# Patient Record
Sex: FEMALE | Race: WHITE | NOT HISPANIC OR LATINO | Employment: OTHER | URBAN - METROPOLITAN AREA
[De-identification: names, ages, dates, MRNs, and addresses within clinical notes are randomized per-mention and may not be internally consistent; named-entity substitution may affect disease eponyms.]

---

## 2017-03-06 ENCOUNTER — OFFICE VISIT (OUTPATIENT)
Dept: PEDIATRICS | Facility: CLINIC | Age: 27
End: 2017-03-06
Payer: COMMERCIAL

## 2017-03-06 VITALS
BODY MASS INDEX: 21.38 KG/M2 | HEART RATE: 85 BPM | OXYGEN SATURATION: 97 % | SYSTOLIC BLOOD PRESSURE: 110 MMHG | DIASTOLIC BLOOD PRESSURE: 70 MMHG | TEMPERATURE: 98.4 F | WEIGHT: 128.3 LBS | HEIGHT: 65 IN

## 2017-03-06 DIAGNOSIS — G44.209 MUSCLE TENSION HEADACHE: Primary | ICD-10-CM

## 2017-03-06 DIAGNOSIS — M62.838 MUSCLE SPASMS OF NECK: ICD-10-CM

## 2017-03-06 DIAGNOSIS — H53.9 VISION CHANGES: ICD-10-CM

## 2017-03-06 PROCEDURE — 99214 OFFICE O/P EST MOD 30 MIN: CPT | Performed by: NURSE PRACTITIONER

## 2017-03-06 RX ORDER — AMITRIPTYLINE HYDROCHLORIDE 10 MG/1
TABLET ORAL
Qty: 30 TABLET | Refills: 0 | Status: SHIPPED | OUTPATIENT
Start: 2017-03-06 | End: 2017-05-31

## 2017-03-06 NOTE — PATIENT INSTRUCTIONS
It was a pleasure seeing you today at the [facility]. Thank you for allowing us to care for you today. We truly hope we provided you with the excellent service you deserve. Please let us know if there is anything else we can do for you so we can be sure you are leaving completley satisfied with your care experience.       General information about your clinic   Clinic Hours Lab Hours (Appointments are required)   Mon-Thurs: 7:30 AM - 7 PM Mon-Thurs: 7:30 AM - 7 PM   Fri: 7:30 AM - 5 PM Fri: 7:30 AM - 5 PM        After Hours Nurse Advise & Appts:  [facility] Nurse Advisors: [phone]  [facility] On Call: to make appointments anytime: [phone] On Call Physician: call [phone] and answering service will page the on call physician.        For urgent appointments, please call [phone] and ask for the triage nurse or your care team clinic nurse.  How to contact my care team:  [facility]: [email or web address]   Phone: [phone]   Fax: [phone]       [facility]:   Phone: [phone]  Hours: 8:00 AM - 6:00 PM  Medication Refills:  Call your pharmacy and they will forward the refill to us. Please allow 3 business days for your refills to be completed.       Normal or non-critical lab and imaging results will be communicated to you by MyChart, letter or phone within 7 days.  If you do not hear from us within 10 days, please call the clinic. If you have a critical or abnormal lab result, we will notify you by phone as soon as possible.       We now have PWIC (Pediatric Walk in Care)  Monday-Friday from 7:30-4. Simply walk in and be seen for your urgent needs like cough, fever, rash, diarrhea or vomiting, pink eye, UTI. No appointments needed. Ask one of the team for more information      -Your Care Team:    Dr. Barbara Foley - Internal Medicine/Pediatrics   Dr. Chandana Boone - Family Medicine  Dr. Aleida Medel - Pediatrics  Eneida Aviles CNP - Family Practice Nurse  Practitioner  Dr. Mone Barnett - Pediatrics  Dr. Jason Plaza - Internal Medicine      PLAN:   1.   Symptomatic therapy suggested:   Will try the elavil for sleep and also for headache prevention  Stop the trazodone.  2.  Orders Placed This Encounter   Medications     tiZANidine (ZANAFLEX) 4 MG tablet     Sig: Take 1 tablet (4 mg) by mouth 3 times daily as needed for muscle spasms     Dispense:  30 tablet     Refill:  1     amitriptyline (ELAVIL) 10 MG tablet     Sig: Start at 1 tab at bedtime for 3 days, then 2 tabs at bedtime     Dispense:  30 tablet     Refill:  0     Orders Placed This Encounter   Procedures     PHYSICAL THERAPY REFERRAL       3. Patient needs to follow up in if no improvement,or sooner if worsening of symptoms or other symptoms develop.  CONSULTATION/REFERRAL to physical therapy

## 2017-03-06 NOTE — MR AVS SNAPSHOT
After Visit Summary   3/6/2017    Xiomara MARSHALL    MRN: [id number]           Patient Information     Date Of Birth          1990        Visit Information        Provider Department      3/6/2017 1:40 PM Eneida Aviles APRN CNP [facility]        Today's Diagnoses     Muscle tension headache    -  1    Vision changes        Muscle spasms of neck          Care Instructions    It was a pleasure seeing you today at the [facility]. Thank you for allowing us to care for you today. We truly hope we provided you with the excellent service you deserve. Please let us know if there is anything else we can do for you so we can be sure you are leaving completley satisfied with your care experience.       General information about your clinic   Clinic Hours Lab Hours (Appointments are required)   Mon-Thurs: 7:30 AM - 7 PM Mon-Thurs: 7:30 AM - 7 PM   Fri: 7:30 AM - 5 PM Fri: 7:30 AM - 5 PM        After Hours Nurse Advise & Appts:  [facility] Nurse Advisors: [phone]  [facility] On Call: to make appointments anytime: [phone] On Call Physician: call [phone] and answering service will page the on call physician.        For urgent appointments, please call [phone] and ask for the triage nurse or your care team clinic nurse.  How to contact my care team:  [facility]: [email or web address]   Phone: [phone]   Fax: [phone]       [facility]:   Phone: [phone]  Hours: 8:00 AM - 6:00 PM  Medication Refills:  Call your pharmacy and they will forward the refill to us. Please allow 3 business days for your refills to be completed.       Normal or non-critical lab and imaging results will be communicated to you by MyChart, letter or phone within 7 days.  If you do not hear from us within 10 days, please call the clinic. If you have a critical or abnormal lab result, we will notify you by phone as soon as possible.       We now  have PWIC (Pediatric Walk in Care)  Monday-Friday from 7:30-4. Simply walk in and be seen for your urgent needs like cough, fever, rash, diarrhea or vomiting, pink eye, UTI. No appointments needed. Ask one of the team for more information      -Your Care Team:    Dr. Barbara Foley - Internal Medicine/Pediatrics   Dr. Chandana Boone - Family Medicine  Dr. Aleida Medel - Pediatrics  Eneida Aviles CNP - Family Practice Nurse Practitioner  Dr. Mone Barnett - Pediatrics  Dr. Jason Plaza - Internal Medicine      PLAN:   1.   Symptomatic therapy suggested:   Will try the elavil for sleep and also for headache prevention  Stop the trazodone.  2.  Orders Placed This Encounter   Medications     tiZANidine (ZANAFLEX) 4 MG tablet     Sig: Take 1 tablet (4 mg) by mouth 3 times daily as needed for muscle spasms     Dispense:  30 tablet     Refill:  1     amitriptyline (ELAVIL) 10 MG tablet     Sig: Start at 1 tab at bedtime for 3 days, then 2 tabs at bedtime     Dispense:  30 tablet     Refill:  0     Orders Placed This Encounter   Procedures     PHYSICAL THERAPY REFERRAL       3. Patient needs to follow up in if no improvement,or sooner if worsening of symptoms or other symptoms develop.  CONSULTATION/REFERRAL to physical therapy               Follow-ups after your visit        Additional Services     PHYSICAL THERAPY REFERRAL       *This therapy referral will be filtered to a centralized scheduling office at [facility] and the patient will receive a call to schedule an appointment at a [place] location most convenient for them. *     [facility] provides Physical Therapy evaluation and treatment and many specialty services across the [place] system.  If requesting a specialty program, please choose from the list below.    If you have not heard from the scheduling office within 2 business days, please call [phone] for all locations, with the exception of [place], please call  [phone].  Treatment: Evaluation & Treatment  Special Instructions/Modalities:   Special Programs: None    Please be aware that coverage of these services is subject to the terms and limitations of your health insurance plan.  Call member services at your health plan with any benefit or coverage questions.      **Note to Provider:  If you are referring outside of [facility] for the therapy appointment, please list the name of the location in the  special instructions  above, print the referral and give to the patient to schedule the appointment.                  Who to contact     If you have questions or need follow up information about today's clinic visit or your schedule please contact [facility] directly at [phone].  Normal or non-critical lab and imaging results will be communicated to you by Nopsechart, letter or phone within 4 business days after the clinic has received the results. If you do not hear from us within 7 days, please contact the clinic through Spindlet or phone. If you have a critical or abnormal lab result, we will notify you by phone as soon as possible.  Submit refill requests through Sp[employer]Frog or call your pharmacy and they will forward the refill request to us. Please allow 3 business days for your refill to be completed.          Additional Information About Your Visit        SpiralFrog Information     Spir[employer]Frog gives you secure access to your electronic health record. If you see a primary care provider, you can also send messages to your care team and make appointments. If you have questions, please call your primary care clinic.  If you do not have a primary care provider, please call [phone] and they will assist you.      SpiralFrog is an electronic gateway that provides easy, online access to your medical records. With SpiralFrog, you can request a clinic appointment, read your test results, renew a prescription or communicate with your care team.     To access your  "existing account, please contact your [facility] or call [phone] for assistance.        Care EveryWhere ID     This is your Care EveryWhere ID. This could be used by other organizations to access your [facility] medical records  [id number]        Your Vitals Were     Pulse Temperature Height Last Period Pulse Oximetry Breastfeeding?    85 98.4  F (36.9  C) (Temporal) 5' 5\" (1.651 m) 02/20/2017 97% No    BMI (Body Mass Index)                   21.35 kg/m2            Blood Pressure from Last 3 Encounters:   03/06/17 110/70   10/28/16 107/57   10/03/16 115/74    Weight from Last 3 Encounters:   03/06/17 128 lb 4.8 oz (58.2 kg)   10/28/16 122 lb 6.4 oz (55.5 kg)   10/03/16 122 lb (55.3 kg)              We Performed the Following     PHYSICAL THERAPY REFERRAL          Today's Medication Changes          These changes are accurate as of: 3/6/17  2:16 PM.  If you have any questions, ask your nurse or doctor.               Start taking these medicines.        Dose/Directions    amitriptyline 10 MG tablet   Commonly known as:  ELAVIL   Used for:  Muscle tension headache   Started by:  Eneida Aviles APRN CNP        Start at 1 tab at bedtime for 3 days, then 2 tabs at bedtime   Quantity:  30 tablet   Refills:  0       tiZANidine 4 MG tablet   Commonly known as:  ZANAFLEX   Used for:  Muscle tension headache   Started by:  Eneida Aviles APRN CNP        Dose:  4 mg   Take 1 tablet (4 mg) by mouth 3 times daily as needed for muscle spasms   Quantity:  30 tablet   Refills:  1         Stop taking these medicines if you haven't already. Please contact your care team if you have questions.     traZODone 100 MG tablet   Commonly known as:  DESYREL   Stopped by:  Eneida Aviles APRN CNP                Where to get your medicines      These medications were sent to [id number] - [place], [place] - [address]  [address] [place] [address]     Phone:  [phone]     amitriptyline 10 MG tablet    tiZANidine 4 MG tablet                Primary Care Provider Office Phone # Fax #    KEL Goldstein [facility] [phone]       [facility] [address]  [place] [address]        Thank you!     Thank you for choosing [facility]  for your care. Our goal is always to provide you with excellent care. Hearing back from our patients is one way we can continue to improve our services. Please take a few minutes to complete the written survey that you may receive in the mail after your visit with us. Thank you!             Your Updated Medication List - Protect others around you: Learn how to safely use, store and throw away your medicines at www.disposemymeds.org.          This list is accurate as of: 3/6/17  2:16 PM.  Always use your most recent med list.                   Brand Name Dispense Instructions for use    amitriptyline 10 MG tablet    ELAVIL    30 tablet    Start at 1 tab at bedtime for 3 days, then 2 tabs at bedtime       ondansetron 4 MG tablet    ZOFRAN     Take 1 Tab by mouth every 8 (eight) hours as needed for Nausea & Vomiting.       ranitidine 150 MG tablet    ZANTAC    60 tablet    Take 1 tablet (150 mg) by mouth 2 times daily       sertraline 100 MG tablet    ZOLOFT    90 tablet    Take 1 tablet (100 mg) by mouth daily 1 tablet orally daily       tiZANidine 4 MG tablet    ZANAFLEX    30 tablet    Take 1 tablet (4 mg) by mouth 3 times daily as needed for muscle spasms
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
## 2017-05-08 DIAGNOSIS — G47.00 INSOMNIA, UNSPECIFIED TYPE: ICD-10-CM

## 2017-05-09 RX ORDER — TRAZODONE HYDROCHLORIDE 100 MG/1
TABLET ORAL
Qty: 90 TABLET | Refills: 0 | OUTPATIENT
Start: 2017-05-09

## 2017-05-09 NOTE — TELEPHONE ENCOUNTER
traZODone (DESYREL) 100 MG tablet  Medication DC'd on 3/6/2017  Refill denied.  Clara Roberts RN

## 2017-05-31 ENCOUNTER — MYC REFILL (OUTPATIENT)
Dept: PEDIATRICS | Facility: CLINIC | Age: 27
End: 2017-05-31

## 2017-05-31 DIAGNOSIS — G44.209 MUSCLE TENSION HEADACHE: ICD-10-CM

## 2017-06-01 RX ORDER — AMITRIPTYLINE HYDROCHLORIDE 10 MG/1
10 TABLET ORAL AT BEDTIME
Qty: 90 TABLET | Refills: 1 | Status: SHIPPED | OUTPATIENT
Start: 2017-06-01 | End: 2017-06-01

## 2017-06-01 NOTE — TELEPHONE ENCOUNTER
Routing refill request to provider for review/approval because:  Natalia, can you please review and enter appropriate ongoing dose for this patient now that they have completed the starting dose?      Clara Roberts RN

## 2017-06-01 NOTE — TELEPHONE ENCOUNTER
Message from [place]:  Original authorizing provider: KEL Goldstein CNP would like a refill of the following medications:  amitriptyline (ELAVIL) 10 MG tablet [KEL Goldstein CNP]    Preferred pharmacy: [id number] - [place], [place] - [address]    Comment:

## 2017-06-01 NOTE — TELEPHONE ENCOUNTER
amitriptyline (ELAVIL) 10 MG tablet     Last Written Prescription Date: 03/06/17  Last Fill Quantity: 30, # refills: 0  Last Office Visit with [facility], [facility] or [facility] prescribing provider: 03/06/17.        BP Readings from Last 3 Encounters:   03/06/17 110/70   10/28/16 107/57   10/03/16 115/74     Last PHQ-9 score on record=   PHQ-9 SCORE 10/3/2016   Total Score 4

## 2017-08-18 ENCOUNTER — OFFICE VISIT (OUTPATIENT)
Dept: PEDIATRICS | Facility: CLINIC | Age: 27
End: 2017-08-18
Payer: COMMERCIAL

## 2017-08-18 VITALS
TEMPERATURE: 97.8 F | SYSTOLIC BLOOD PRESSURE: 114 MMHG | DIASTOLIC BLOOD PRESSURE: 60 MMHG | HEART RATE: 98 BPM | WEIGHT: 133.4 LBS | OXYGEN SATURATION: 98 % | BODY MASS INDEX: 22.2 KG/M2

## 2017-08-18 DIAGNOSIS — S06.9XAS HEADACHE DUE TO OLD BRAIN INJURY (H): Primary | ICD-10-CM

## 2017-08-18 DIAGNOSIS — G44.309 HEADACHE DUE TO OLD BRAIN INJURY (H): Primary | ICD-10-CM

## 2017-08-18 PROCEDURE — 99214 OFFICE O/P EST MOD 30 MIN: CPT | Performed by: NURSE PRACTITIONER

## 2017-08-18 RX ORDER — NORTRIPTYLINE HCL 10 MG
CAPSULE ORAL
Qty: 90 CAPSULE | Refills: 0 | Status: SHIPPED | OUTPATIENT
Start: 2017-08-18 | End: 2018-01-02 | Stop reason: ALTCHOICE

## 2017-08-18 RX ORDER — BUTALBITAL, ACETAMINOPHEN AND CAFFEINE 50; 325; 40 MG/1; MG/1; MG/1
1 TABLET ORAL EVERY 4 HOURS PRN
Qty: 28 TABLET | Refills: 1 | Status: SHIPPED | OUTPATIENT
Start: 2017-08-18 | End: 2018-01-02

## 2017-08-18 ASSESSMENT — PAIN SCALES - GENERAL: PAINLEVEL: EXTREME PAIN (8)
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
## 2017-08-18 NOTE — PATIENT INSTRUCTIONS
PLAN:   1.   Symptomatic therapy suggested: Will try the nortriptyline at bedtime and the Fioricet as needed.  2.  Orders Placed This Encounter   Medications     nortriptyline (PAMELOR) 10 MG capsule     Sig: Start at 1 tab at bedtime for 3 days, then 2 tabs at bedtime for 3 days, then 3 tabs at bedtime.     Dispense:  90 capsule     Refill:  0     butalbital-acetaminophen-caffeine (FIORICET/ESGIC) -40 MG per tablet     Sig: Take 1 tablet by mouth every 4 hours as needed     Dispense:  28 tablet     Refill:  1     Orders Placed This Encounter   Procedures     MR Brain w/o Contrast     NEUROLOGY ADULT REFERRAL       3. Patient needs to follow up in if no improvement,or sooner if worsening of symptoms or other symptoms develop.  CONSULTATION/REFERRAL to Neurology    It was a pleasure seeing you today at the [facility]. Thank you for allowing us to care for you today. We truly hope we provided you with the excellent service you deserve. Please let us know if there is anything else we can do for you so we can be sure you are leaving completley satisfied with your care experience.       General information about your clinic   Clinic Hours Lab Hours (Appointments are required)   Mon-Thurs: 7:30 AM - 7 PM Mon-Thurs: 7:30 AM - 7 PM   Fri: 7:30 AM - 5 PM Fri: 7:30 AM - 5 PM        After Hours Nurse Advise & Appts:  [facility] Nurse Advisors: [phone]  [facility] On Call: to make appointments anytime: [phone] On Call Physician: call [phone] and answering service will page the on call physician.        For urgent appointments, please call [phone] and ask for the triage nurse or your care team clinic nurse.  How to contact my care team:  MyChart: [email or web address]   Phone: [phone]   Fax: [phone]       [facility]:   Phone: [phone]  Hours: 8:00 AM - 6:00 PM  Medication Refills:  Call your pharmacy and they will forward the refill to us. Please allow  3 business days for your refills to be completed.       Normal or non-critical lab and imaging results will be communicated to you by MyChart, letter or phone within 7 days.  If you do not hear from us within 10 days, please call the clinic. If you have a critical or abnormal lab result, we will notify you by phone as soon as possible.       We now have PWIC (Pediatric Walk in Care)  Monday-Friday from 7:30-4. Simply walk in and be seen for your urgent needs like cough, fever, rash, diarrhea or vomiting, pink eye, UTI. No appointments needed. Ask one of the team for more information      -Your Care Team:    Dr. Jason Plaza - Internal Medicine  Dr. Barbara Foley - Internal Medicine/Pediatrics   Dr. Chandana Boone - Family Medicine  Dr. Aleida Medel - Pediatrics  Dr. Mone Barnett - Pediatrics  Eneida Aviles CNP - Family Practice Nurse Practitioner

## 2017-08-18 NOTE — MR AVS SNAPSHOT
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   Symptomatic therapy suggested: Will try the nortriptyline at bedtime and the Fioricet as needed.  2.  Orders Placed This Encounter   Medications     nortriptyline (PAMELOR) 10 MG capsule     Sig: Start at 1 tab at bedtime for 3 days, then 2 tabs at bedtime for 3 days, then 3 tabs at bedtime.     Dispense:  90 capsule     Refill:  0     butalbital-acetaminophen-caffeine (FIORICET/ESGIC) -40 MG per tablet     Sig: Take 1 tablet by mouth every 4 hours as needed     Dispense:  28 tablet     Refill:  1     Orders Placed This Encounter   Procedures     MR Brain w/o Contrast     NEUROLOGY ADULT REFERRAL       3. Patient needs to follow up in if no improvement,or sooner if worsening of symptoms or other symptoms develop.  CONSULTATION/REFERRAL to Neurology    It was a pleasure seeing you today at the [facility]. Thank you for allowing us to care for you today. We truly hope we provided you with the excellent service you deserve. Please let us know if there is anything else we can do for you so we can be sure you are leaving completley satisfied with your care experience.       General information about your clinic   Clinic Hours Lab Hours (Appointments are required)   Mon-Thurs: 7:30 AM - 7 PM Mon-Thurs: 7:30 AM - 7 PM   Fri: 7:30 AM - 5 PM Fri: 7:30 AM - 5 PM        After Hours Nurse Advise & Appts:  [facility] Nurse Advisors: [phone]  [facility] On Call: to make appointments anytime: [phone] On Call Physician: call [phone] and answering service will page the on call physician.         For urgent appointments, please call [phone] and ask for the triage nurse or your care team clinic nurse.  How to contact my care team:  [facility]: [email or web address]   Phone: [phone]   Fax: [phone]       [facility]:   Phone: [phone]  Hours: 8:00 AM - 6:00 PM  Medication Refills:  Call your pharmacy and they will forward the refill to us. Please allow 3 business days for your refills to be completed.       Normal or non-critical lab and imaging results will be communicated to you by MyChart, letter or phone within 7 days.  If you do not hear from us within 10 days, please call the clinic. If you have a critical or abnormal lab result, we will notify you by phone as soon as possible.       We now have PWIC (Pediatric Walk in Care)  Monday-Friday from 7:30-4. Simply walk in and be seen for your urgent needs like cough, fever, rash, diarrhea or vomiting, pink eye, UTI. No appointments needed. Ask one of the team for more information      -Your Care Team:    Dr. Jason Plaza - Internal Medicine  Dr. Barbara Foley - Internal Medicine/Pediatrics   Dr. Chandana Boone - Family Medicine  Dr. Aleida Medel - Pediatrics  Dr. Mone Barnett - Pediatrics  Eneida Aviles CNP - Family Practice Nurse Practitioner                           Follow-ups after your visit        Additional Services     NEUROLOGY ADULT REFERRAL       Your provider has referred you for the following:   Consult at [facility]: [facility] [phone]   [email or web address]    Please be aware that coverage of these services is subject to the terms and limitations of your health insurance plan.  Call member services at your health plan with any benefit or coverage questions.      Please bring the following with you to your appointment:    (1) Any X-Rays, CTs or MRIs which have been performed.  Contact the facility where they were done to arrange for pick  up prior to your scheduled appointment.    (2) List of current medications  (3) This referral request   (4) Any documents/labs given to you for this referral                  Future tests that were ordered for you today     Open Future Orders        Priority Expected Expires Ordered    MR Brain w/o Contrast Routine  8/18/2018 8/18/2017            Who to contact     If you have questions or need follow up information about today's clinic visit or your schedule please contact [facility] directly at [phone].  Normal or non-critical lab and imaging results will be communicated to you by [employer], letter or phone within 4 business days after the clinic has received the results. If you do not hear from us within 7 days, please contact the clinic through [employer] or phone. If you have a critical or abnormal lab result, we will notify you by phone as soon as possible.  Submit refill requests through [employer] or call your pharmacy and they will forward the refill request to us. Please allow 3 business days for your refill to be completed.          Additional Information About Your Visit        [employer] Information     [employer] gives you secure access to your electronic health record. If you see a primary care provider, you can also send messages to your care team and make appointments. If you have questions, please call your primary care clinic.  If you do not have a primary care provider, please call [phone] and they will assist you.      [employer] is an electronic gateway that provides easy, online access to your medical records. With [employer], you can request a clinic appointment, read your test results, renew a prescription or communicate with your care team.     To access your existing account, please contact your [facility] or call [phone] for assistance.        Care EveryWhere ID     This is your Care EveryWhere ID. This could be used by other organizations to  access your [facility] medical records  [id number]        Your Vitals Were     Pulse Temperature Pulse Oximetry BMI (Body Mass Index)          98 97.8  F (36.6  C) (Oral) 98% 22.2 kg/m2         Blood Pressure from Last 3 Encounters:   08/18/17 114/60   03/06/17 110/70   10/28/16 107/57    Weight from Last 3 Encounters:   08/18/17 133 lb 6.4 oz (60.5 kg)   03/06/17 128 lb 4.8 oz (58.2 kg)   10/28/16 122 lb 6.4 oz (55.5 kg)              We Performed the Following     NEUROLOGY ADULT REFERRAL          Today's Medication Changes          These changes are accurate as of: 8/18/17  2:34 PM.  If you have any questions, ask your nurse or doctor.               Start taking these medicines.        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   Quantity:  90 capsule   Refills:  0         Stop taking these medicines if you haven't already. Please contact your care team if you have questions.     amitriptyline 10 MG tablet   Commonly known as:  ELAVIL   Stopped by:  Eneida Aviles APRN CNP                Where to get your medicines      These medications were sent to [id number] - [place], [place] - [address] AT [address] [facility] [address])  [address], [place] [address]     Phone:  [phone]     nortriptyline 10 MG capsule         Some of these will need a paper prescription and others can be bought over the counter.  Ask your nurse if you have  questions.     Bring a paper prescription for each of these medications     butalbital-acetaminophen-caffeine -40 MG per tablet                Primary Care Provider Office Phone # Fax #    Eneida Aviles, KEL [facility] [phone] [address]  [place] [address]        Equal Access to Services     MICAELA DAVIES : Hadii aad ku hadasho Soomaali, waaxda luqadaha, qaybta kaalmada adeegyada, waxay idiin hayaan adeeg kharash la'aan . So [facility] [phone].    ATENCIÓN: Si habla español, tiene a lindquist disposición servicios gratuitos de asistencia lingüística. Llame al [phone].    We comply with applicable federal civil rights laws and [place] laws. We do not discriminate on the basis of race, color, national origin, age, disability sex, sexual orientation or gender identity.            Thank you!     Thank you for choosing [facility]  for your care. Our goal is always to provide you with excellent care. Hearing back from our patients is one way we can continue to improve our services. Please take a few minutes to complete the written survey that you may receive in the mail after your visit with us. Thank you!             Your Updated Medication List - Protect others around you: Learn how to safely use, store and throw away your medicines at www.disposemymeds.org.          This list is accurate as of: 8/18/17  2:34 PM.  Always use your most recent med list.                   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        ranitidine 150 MG tablet    ZANTAC    60 tablet    Take 1 tablet (150 mg) by mouth 2 times daily    Dyspepsia       tiZANidine 4 MG tablet    ZANAFLEX    30 tablet    Take 1 tablet (4 mg) by mouth 3 times daily as needed for muscle spasms    Muscle tension headache

## 2017-08-18 NOTE — NURSING NOTE
"Chief Complaint   Patient presents with     Hospital F/U       Initial /60  Pulse 98  Temp 97.8  F (36.6  C) (Oral)  Wt 133 lb 6.4 oz (60.5 kg)  SpO2 98%  BMI 22.2 kg/m2 Estimated body mass index is 22.2 kg/(m^2) as calculated from the following:    Height as of 3/6/17: 5' 5\" (1.651 m).    Weight as of this encounter: 133 lb 6.4 oz (60.5 kg).  BP completed using cuff size: baylee Rhodes CMA    "

## 2017-08-18 NOTE — LETTER
August 18, 2017      RE: Xiomara Doherty  [address] [place] [address]       To whom it may concern:    Xiomara Doherty was seen in our clinic today. She  may return to work with the following: No working or lifting restrictions on or about 8/25/17. In that period of time will have her getting workup and hopefully get in with specialist     Sincerely,      Eneida Aviles RN, CNP

## 2017-08-21 ENCOUNTER — HOSPITAL ENCOUNTER (OUTPATIENT)
Dept: MRI IMAGING | Facility: CLINIC | Age: 27
Discharge: HOME | End: 2017-08-21
Attending: NURSE PRACTITIONER | Admitting: NURSE PRACTITIONER
Payer: COMMERCIAL

## 2017-08-21 DIAGNOSIS — G44.309 HEADACHE DUE TO OLD BRAIN INJURY (H): ICD-10-CM

## 2017-08-21 DIAGNOSIS — S06.9XAS HEADACHE DUE TO OLD BRAIN INJURY (H): ICD-10-CM

## 2017-08-21 PROCEDURE — 70551 MRI BRAIN STEM W/O DYE: CPT

## 2017-08-21 NOTE — PROGRESS NOTES
Kasia Doherty,    Attached are your test results.  MRI brain is normal    Please contact us if you have any questions.    Eneida Aviles, CNP

## 2017-08-23 ENCOUNTER — OFFICE VISIT (OUTPATIENT)
Dept: NEUROLOGY | Facility: CLINIC | Age: 27
End: 2017-08-23
Attending: NURSE PRACTITIONER
Payer: COMMERCIAL

## 2017-08-23 ENCOUNTER — MYC MEDICAL ADVICE (OUTPATIENT)
Dept: PEDIATRICS | Facility: CLINIC | Age: 27
End: 2017-08-23

## 2017-08-23 VITALS
BODY MASS INDEX: 23.7 KG/M2 | OXYGEN SATURATION: 97 % | HEIGHT: 64 IN | HEART RATE: 91 BPM | SYSTOLIC BLOOD PRESSURE: 109 MMHG | DIASTOLIC BLOOD PRESSURE: 74 MMHG | WEIGHT: 138.8 LBS

## 2017-08-23 DIAGNOSIS — G44.309 POST-CONCUSSION HEADACHE: ICD-10-CM

## 2017-08-23 DIAGNOSIS — G44.52 NEW DAILY PERSISTENT HEADACHE: Primary | ICD-10-CM

## 2017-08-23 PROCEDURE — 99214 OFFICE O/P EST MOD 30 MIN: CPT | Performed by: PSYCHIATRY & NEUROLOGY

## 2017-08-23 RX ORDER — GABAPENTIN 100 MG/1
CAPSULE ORAL
Qty: 270 CAPSULE | Refills: 1 | Status: SHIPPED | OUTPATIENT
Start: 2017-08-23 | End: 2018-01-02

## 2017-08-23 ASSESSMENT — PAIN SCALES - GENERAL: PAINLEVEL: EXTREME PAIN (8)

## 2017-08-23 NOTE — NURSING NOTE
"Xiomara Doherty's goals for this visit include:   She requests these members of her care team be copied on today's visit information:     PCP: Eneida Aviles    Referring Provider:  Eneida Aviles, APRN CNP  [address]  [place] [address]    Chief Complaint   Patient presents with     RECHECK       Initial /74  Pulse 91  Ht 1.626 m (5' 4\")  Wt 63 kg (138 lb 12.8 oz)  SpO2 97%  BMI 23.82 kg/m2 Estimated body mass index is 23.82 kg/(m^2) as calculated from the following:    Height as of this encounter: 1.626 m (5' 4\").    Weight as of this encounter: 63 kg (138 lb 12.8 oz).  Medication Reconciliation: complete    Do you need any medication refills at today's visit? ?  "
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
## 2017-08-23 NOTE — MR AVS SNAPSHOT
After Visit Summary   8/23/2017    Xiomara Doherty    MRN: [id number]           Patient Information     Date Of Birth          1990        Visit Information        Provider Department      8/23/2017 3:30 PM Robin Montero MD [facility]        Today's Diagnoses     New daily persistent headache    -  1    Post-concussion headache           Follow-ups after your visit        Follow-up notes from your care team     Discussed this visit Return in about 4 weeks (around 9/20/2017).      Who to contact     If you have questions or need follow up information about today's clinic visit or your schedule please contact [facility] directly at [phone].  Normal or non-critical lab and imaging results will be communicated to you by MyChart, letter or phone within 4 business days after the clinic has received the results. If you do not hear from us within 7 days, please contact the clinic through [employer] or phone. If you have a critical or abnormal lab result, we will notify you by phone as soon as possible.  Submit refill requests through [employer] or call your pharmacy and they will forward the refill request to us. Please allow 3 business days for your refill to be completed.          Additional Information About Your Visit        MyChart Information     [employer] gives you secure access to your electronic health record. If you see a primary care provider, you can also send messages to your care team and make appointments. If you have questions, please call your primary care clinic.  If you do not have a primary care provider, please call [phone] and they will assist you.      [employer] is an electronic gateway that provides easy, online access to your medical records. With [employer], you can request a clinic appointment, read your test results, renew a prescription or communicate with your care team.     To access your existing account, please contact your  "[facility] or call [phone] for assistance.        Care EveryWhere ID     This is your Care EveryWhere ID. This could be used by other organizations to access your [facility] medical records  [id number]        Your Vitals Were     Pulse Height Pulse Oximetry BMI (Body Mass Index)          91 1.626 m (5' 4\") 97% 23.82 kg/m2         Blood Pressure from Last 3 Encounters:   08/23/17 109/74   08/18/17 114/60   03/06/17 110/70    Weight from Last 3 Encounters:   08/23/17 63 kg (138 lb 12.8 oz)   08/18/17 60.5 kg (133 lb 6.4 oz)   03/06/17 58.2 kg (128 lb 4.8 oz)              Today, you had the following     No orders found for display         Today's Medication Changes          These changes are accurate as of: 8/23/17  3:38 PM.  If you have any questions, ask your nurse or doctor.               Start taking these medicines.        Dose/Directions    gabapentin 100 MG capsule   Commonly known as:  NEURONTIN   Used for:  New daily persistent headache, Post-concussion headache   Started by:  Robin Montero MD        One 3 times a day. May increase to 2 capsules 3 times a day after 1 week, may increase to 3 capsules 3 times a day after another week   Quantity:  270 capsule   Refills:  1         These medicines have changed or have updated prescriptions.        Dose/Directions    nortriptyline 10 MG capsule   Commonly known as:  PAMELOR   This may have changed:    - how much to take  - how to take this  - when to take this  - additional instructions   Used for:  Headache due to old brain injury (H)        Start at 1 tab at bedtime for 3 days, then 2 tabs at bedtime for 3 days, then 3 tabs at bedtime.   Quantity:  90 capsule   Refills:  0            Where to get your medicines      These medications were sent to [id number] - [place], [place] - [address] AT [facility] [address])  [address], [place] [address]     Phone:  [phone]     " gabapentin 100 MG capsule                Primary Care Provider Office Phone # Fax #    Eneida Aviles, APRN [facility] [phone] [address]  [place] [address]        Equal Access to Services     MICAELA DAVIES : Hadii aad ku hadsulyo Soomaali, waaxda luqadaha, qaybta kaalmada adeegyada, waxsreedhar idiin haygabriellan adeaziza robin bharathi thomas. So [facility] [phone].    ATENCIÓN: Si habla español, tiene a lindquist disposición servicios gratuitos de asistencia lingüística. Llame al [phone].    We comply with applicable federal civil rights laws and [place] laws. We do not discriminate on the basis of race, color, national origin, age, disability sex, sexual orientation or gender identity.            Thank you!     Thank you for choosing [facility]  for your care. Our goal is always to provide you with excellent care. Hearing back from our patients is one way we can continue to improve our services. Please take a few minutes to complete the written survey that you may receive in the mail after your visit with us. Thank you!             Your Updated Medication List - Protect others around you: Learn how to safely use, store and throw away your medicines at www.disposemymeds.org.          This list is accurate as of: 8/23/17  3:38 PM.  Always use your most recent med list.                   Brand Name Dispense Instructions for use Diagnosis    butalbital-acetaminophen-caffeine -40 MG per tablet    FIORICET/ESGIC    28 tablet    Take 1 tablet by mouth every 4 hours as needed    Headache due to old brain injury (H)       gabapentin 100 MG capsule    NEURONTIN    270 capsule    One 3 times a day. May increase to 2 capsules 3 times a day after 1 week, may increase to 3 capsules 3 times a day after another week    New daily persistent headache, Post-concussion headache       nortriptyline 10 MG capsule    PAMELOR    90 capsule    Start at 1 tab at bedtime for 3 days, then 2 tabs at  bedtime for 3 days, then 3 tabs at bedtime.    Headache due to old brain injury (H)       ondansetron 4 MG tablet    ZOFRAN     Take 1 Tab by mouth every 8 (eight) hours as needed for Nausea & Vomiting.        ranitidine 150 MG tablet    ZANTAC    60 tablet    Take 1 tablet (150 mg) by mouth 2 times daily    Dyspepsia       tiZANidine 4 MG tablet    ZANAFLEX     as needed
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
## 2017-08-24 NOTE — TELEPHONE ENCOUNTER
Natalia won't be back until next Thursday. I would recommend she schedules an appointment with me or Dr. Boone early next week to review work extension.

## 2017-08-24 NOTE — TELEPHONE ENCOUNTER
"Routing to covering provider to please review. Eneida Aviles CNP last letter for patient was written on 8/18/2017 with the below statement.       \"To whom it may concern:     Xiomara Doherty was seen in our clinic today. She  may return to work with the following: No working or lifting restrictions on or about 8/25/17. In that period of time will have her getting workup and hopefully get in with specialist      Sincerely,        Eneida Aviles RN, CNP\"    Clara Roberts RN    "

## 2017-09-12 DIAGNOSIS — R10.13 DYSPEPSIA: ICD-10-CM

## 2017-09-12 NOTE — TELEPHONE ENCOUNTER
Routing refill request to provider for review/approval because:  A break in medication    ranitidine (ZANTAC) 150 MG tablet      Last Written Prescription Date: 9/8/16  Last Fill Quantity: 60,  # refills: 2   Last Office Visit with [facility]SE[facility], [facility] or [facility] prescribing provider: 8/18/17                                         Next 5 appointments (look out 90 days)     Sep 20, 2017 12:00 PM CDT   [facility] Visit with Robin Montero MD   [facility]    [address] [place] [id number]   [phone]                 Bita Kelly RN   [facility], Primary Care

## 2017-12-26 ENCOUNTER — TRANSFERRED RECORDS (OUTPATIENT)
Dept: HEALTH INFORMATION MANAGEMENT | Facility: CLINIC | Age: 27
End: 2017-12-26

## 2017-12-26 LAB
CREAT SERPL-MCNC: 0.92 MG/DL (ref 0.55–1.02)
GFR SERPL CREATININE-BSD FRML MDRD: >60 ML/MIN/1.73M2
GLUCOSE SERPL-MCNC: 86 MG/DL (ref 70–110)
POTASSIUM SERPL-SCNC: 3.6 MMOL/L (ref 3.5–5.1)

## 2018-01-02 ENCOUNTER — OFFICE VISIT (OUTPATIENT)
Dept: PEDIATRICS | Facility: CLINIC | Age: 28
End: 2018-01-02
Payer: COMMERCIAL

## 2018-01-02 VITALS
SYSTOLIC BLOOD PRESSURE: 115 MMHG | TEMPERATURE: 97.2 F | WEIGHT: 131.2 LBS | BODY MASS INDEX: 22.4 KG/M2 | HEIGHT: 64 IN | HEART RATE: 114 BPM | OXYGEN SATURATION: 100 % | DIASTOLIC BLOOD PRESSURE: 70 MMHG

## 2018-01-02 DIAGNOSIS — M54.10 RADICULAR PAIN OF BOTH LOWER EXTREMITIES: ICD-10-CM

## 2018-01-02 DIAGNOSIS — M51.369 DDD (DEGENERATIVE DISC DISEASE), LUMBAR: Primary | ICD-10-CM

## 2018-01-02 PROCEDURE — 99214 OFFICE O/P EST MOD 30 MIN: CPT | Performed by: NURSE PRACTITIONER

## 2018-01-02 RX ORDER — DIAZEPAM 2 MG
TABLET ORAL
COMMUNITY
Start: 2017-12-28 | End: 2018-01-08

## 2018-01-02 RX ORDER — GABAPENTIN 300 MG/1
CAPSULE ORAL
Qty: 90 CAPSULE | Refills: 0 | Status: SHIPPED | OUTPATIENT
Start: 2018-01-02 | End: 2018-08-15

## 2018-01-02 RX ORDER — AMITRIPTYLINE HYDROCHLORIDE 10 MG/1
TABLET ORAL
COMMUNITY
Start: 2017-10-30 | End: 2019-01-09

## 2018-01-02 RX ORDER — OXYCODONE HYDROCHLORIDE 15 MG/1
TABLET, FILM COATED, EXTENDED RELEASE ORAL
COMMUNITY
Start: 2017-12-28 | End: 2018-08-15

## 2018-01-02 RX ORDER — OXYCODONE HYDROCHLORIDE 15 MG/1
15 TABLET, FILM COATED, EXTENDED RELEASE ORAL EVERY 12 HOURS
Qty: 30 TABLET | Refills: 0 | Status: SHIPPED | OUTPATIENT
Start: 2018-01-02 | End: 2018-08-15

## 2018-01-02 RX ORDER — OXYCODONE HYDROCHLORIDE 5 MG/1
TABLET ORAL
COMMUNITY
Start: 2017-12-28 | End: 2018-08-15

## 2018-01-02 ASSESSMENT — PAIN SCALES - GENERAL: PAINLEVEL: SEVERE PAIN (7)

## 2018-01-02 NOTE — NURSING NOTE
"Chief Complaint   Patient presents with     Hospital F/U     follow-up from [facility] for lower back pain       Initial /70 (BP Location: Right arm, Patient Position: Sitting, Cuff Size: Adult Regular)  Pulse 114  Temp 97.2  F (36.2  C) (Temporal)  Ht 5' 4\" (1.626 m)  Wt 131 lb 3.2 oz (59.5 kg)  SpO2 100%  BMI 22.52 kg/m2 Estimated body mass index is 22.52 kg/(m^2) as calculated from the following:    Height as of this encounter: 5' 4\" (1.626 m).    Weight as of this encounter: 131 lb 3.2 oz (59.5 kg).  Medication Reconciliation: complete      HARVEY Stewart      "

## 2018-01-02 NOTE — MR AVS SNAPSHOT
After Visit Summary   1/2/2018    Xiomara Doherty    MRN: [id number]           Patient Information     Date Of Birth          1990        Visit Information        Provider Department      1/2/2018 11:30 AM Eneida Aviles APRN CNP [facility]        Today's Diagnoses     DDD (degenerative disc disease), lumbar    -  1    Radicular pain of both lower extremities          Care Instructions    PLAN:   1.   Symptomatic therapy suggested: start back on gabapentin  Has been on valium for the muscle spasms    2.  Orders Placed This Encounter   Medications     oxyCODONE (OXYCONTIN) 15 MG 12 hr tablet     Sig: Take 1 tablet (15 mg) by mouth Twice a Day.     oxyCODONE IR (ROXICODONE) 5 MG tablet     Sig: Take 2-3 tablets (10-15 mg) by mouth every 3 (three) hours as needed (Severe pain).     diazepam (VALIUM) 2 MG tablet     Sig: Take 1-2 tablets (2-4 mg) by mouth every 6 (six) hours as needed (Spasms).     amitriptyline (ELAVIL) 10 MG tablet     Sig: Take 2 tablets by mouth at bedtime as needed.     gabapentin (NEURONTIN) 300 MG capsule     Sig: Take 1 tablet (300 mg) every night for 1-3 days, then 1 tablet twice daily for 1-3 days, then 1 tablet three times daily     Dispense:  90 capsule     Refill:  0     oxyCODONE (OXYCONTIN) 15 MG 12 hr tablet     Sig: Take 1 tablet (15 mg) by mouth every 12 hours     Dispense:  30 tablet     Refill:  0     Orders Placed This Encounter   Procedures     SPINE SURGERY REFERRAL       3. Patient needs to follow up in if no improvement,or sooner if worsening of symptoms or other symptoms develop.  CONSULTATION/REFERRAL to spine specialists       It was a pleasure seeing you today at the [facility]. Thank you for allowing us to care for you today. We truly hope we provided you with the excellent service you deserve. Please let us know if there is anything else we can do for you so we can be sure you are leaving  quangley satisfied with your care experience.       General information about your clinic   Clinic Hours Lab Hours (Appointments are required)   Mon-Thurs: 7:30 AM - 7 PM Mon-Thurs: 7:30 AM - 7 PM   Fri: 7:30 AM - 5 PM Fri: 7:30 AM - 5 PM        After Hours Nurse Advise & Appts:  [facility]: [phone]  [facility] On Call: to make appointments anytime: [phone] On Call Physician: call [phone] and answering service will page the on call physician.        For urgent appointments, please call [phone] and ask for the triage nurse or your care team clinic nurse.  How to contact my care team:  MyChart: [email or web address]   Phone: [phone]   Fax: [phone]       [facility]:   Phone: [phone]  Hours: 8:00 AM - 6:00 PM  Medication Refills:  Call your pharmacy and they will forward the refill to us. Please allow 3 business days for your refills to be completed.       Normal or non-critical lab and imaging results will be communicated to you by MyChart, letter or phone within 7 days.  If you do not hear from us within 10 days, please call the clinic. If you have a critical or abnormal lab result, we will notify you by phone as soon as possible.       We now have PWIC (Pediatric Walk in Care)  Monday-Friday from 7:30-4. Simply walk in and be seen for your urgent needs like cough, fever, rash, diarrhea or vomiting, pink eye, UTI. No appointments needed. Ask one of the team for more information      -Your Care Team:    Dr. Barbara Foley - Internal Medicine/Pediatrics   Dr. Chandana Boone - Family Medicine  Dr. Aleida Medel - Pediatrics  Dr. Mone Barnett - Pediatrics  Eneida Aviles CNP - Family Practice Nurse Practitioner                           Follow-ups after your visit        Additional Services     SPINE SURGERY REFERRAL       Please choose Medical Spine Specialist (unless patient was seen by a Medical Spine Specialist within the past 6 months).  Surgical Evaluation is  advised if the patient presents with one or more of the following red flags:     **Cauda Equina Syndrome  **Evidence of Spinal Tumor  **Fracture  **Infection  **Loss of Bowel or Bladder Control  **Sudden or Progressive Weakness  **Any other documented emergent neurological condition resulting from a Lumbar Spinal Condition.    You have been referred to: Spine Lumbar: Medical Spine Specialist: [facility] [phone]   [email or web address]    Please be aware that coverage of these services is subject to the terms and limitations of your health insurance plan.  Call member services at your health plan with any benefit or coverage questions.      Please bring the following to your appointment:    **Any x-rays, CTs or MRIs which have been performed.  Contact the facility where they were done to arrange for  prior to your scheduled appointment.    **List of current medications   **This referral request   **Any documents/labs given to you regarding this referral                  Who to contact     If you have questions or need follow up information about today's clinic visit or your schedule please contact [facility] directly at [phone].  Normal or non-critical lab and imaging results will be communicated to you by MyChart, letter or phone within 4 business days after the clinic has received the results. If you do not hear from us within 7 days, please contact the clinic through [employer] or phone. If you have a critical or abnormal lab result, we will notify you by phone as soon as possible.  Submit refill requests through [email or web address] or call your pharmacy and they will forward the refill request to us. Please allow 3 business days for your refill to be completed.          Additional Information About Your Visit        [email or web address] Information     [email or web address] gives you secure access to your electronic health record. If you see a primary care provider, you  "can also send messages to your care team and make appointments. If you have questions, please call your primary care clinic.  If you do not have a primary care provider, please call [phone] and they will assist you.      [employer] is an electronic gateway that provides easy, online access to your medical records. With [employer], you can request a clinic appointment, read your test results, renew a prescription or communicate with your care team.     To access your existing account, please contact your [facility] or call [phone] for assistance.        Care EveryWhere ID     This is your Care EveryWhere ID. This could be used by other organizations to access your [place] medical records  [id number]        Your Vitals Were     Pulse Temperature Height Pulse Oximetry BMI (Body Mass Index)       114 97.2  F (36.2  C) (Temporal) 5' 4\" (1.626 m) 100% 22.52 kg/m2        Blood Pressure from Last 3 Encounters:   01/02/18 115/70   08/23/17 109/74   08/18/17 114/60    Weight from Last 3 Encounters:   01/02/18 131 lb 3.2 oz (59.5 kg)   08/23/17 138 lb 12.8 oz (63 kg)   08/18/17 133 lb 6.4 oz (60.5 kg)              We Performed the Following     SPINE SURGERY REFERRAL          Today's Medication Changes          These changes are accurate as of: 1/2/18 11:58 AM.  If you have any questions, ask your nurse or doctor.               Start taking these medicines.        Dose/Directions    gabapentin 300 MG capsule   Commonly known as:  NEURONTIN   Used for:  Radicular pain of both lower extremities, DDD (degenerative disc disease), lumbar   Started by:  Eneida Aviles APRN CNP        Take 1 tablet (300 mg) every night for 1-3 days, then 1 tablet twice daily for 1-3 days, then 1 tablet three times daily   Quantity:  90 capsule   Refills:  0         These medicines have changed or have updated prescriptions.        Dose/Directions    * oxyCODONE 15 MG 12 hr tablet   Commonly known as:  " OxyCONTIN   This may have changed:  Another medication with the same name was added. Make sure you understand how and when to take each.   Changed by:  Eneida Aviles APRN CNP        Take 1 tablet (15 mg) by mouth Twice a Day.   Refills:  0       * oxyCODONE IR 5 MG tablet   Commonly known as:  ROXICODONE   This may have changed:  Another medication with the same name was added. Make sure you understand how and when to take each.   Changed by:  Eneida Aviles APRN CNP        Take 2-3 tablets (10-15 mg) by mouth every 3 (three) hours as needed (Severe pain).   Refills:  0       * oxyCODONE 15 MG 12 hr tablet   Commonly known as:  OxyCONTIN   This may have changed:  You were already taking a medication with the same name, and this prescription was added. Make sure you understand how and when to take each.   Used for:  DDD (degenerative disc disease), lumbar, Radicular pain of both lower extremities   Changed by:  Eneida Aviles APRN CNP        Dose:  15 mg   Take 1 tablet (15 mg) by mouth every 12 hours   Quantity:  30 tablet   Refills:  0       * Notice:  This list has 3 medication(s) that are the same as other medications prescribed for you. Read the directions carefully, and ask your doctor or other care provider to review them with you.      Stop taking these medicines if you haven't already. Please contact your care team if you have questions.     nortriptyline 10 MG capsule   Commonly known as:  PAMELOR   Stopped by:  Eneida Aviles APRN CNP                Where to get your medicines      These medications were sent to [id number] - [place], [place] - [address] AT [facility] [address])  [address], [place] [address]     Phone:  [phone]     gabapentin 300 MG capsule         Some of these will need a paper prescription and others can be bought over the counter.  Ask your nurse if you have questions.     Bring a paper prescription for  each of these medications     oxyCODONE 15 MG 12 hr tablet                Primary Care Provider Office Phone # Fax #    Eneida Aviles, APRN [facility] [phone] [address] [facility] [address]  [place] [address]        Equal Access to Services     MICAELA DAVIES : Hadii aad ku hadasho Soomaali, waaxda luqadaha, qaybta kaalmada adeegyada, waxay raynein haygabriellan adeaziza echavarriakostaodin garvin . So [facility] [phone].    ATENCIÓN: Si habla español, tiene a lindquist disposición servicios gratuitos de asistencia lingüística. Llame al [phone].    We comply with applicable federal civil rights laws and [place] laws. We do not discriminate on the basis of race, color, national origin, age, disability, sex, sexual orientation, or gender identity.            Thank you!     Thank you for choosing [facility]  for your care. Our goal is always to provide you with excellent care. Hearing back from our patients is one way we can continue to improve our services. Please take a few minutes to complete the written survey that you may receive in the mail after your visit with us. Thank you!             Your Updated Medication List - Protect others around you: Learn how to safely use, store and throw away your medicines at www.disposemymeds.org.          This list is accurate as of: 1/2/18 11:58 AM.  Always use your most recent med list.                   Brand Name Dispense Instructions for use Diagnosis    amitriptyline 10 MG tablet    ELAVIL     Take 2 tablets by mouth at bedtime as needed.        diazepam 2 MG tablet    VALIUM     Take 1-2 tablets (2-4 mg) by mouth every 6 (six) hours as needed (Spasms).        gabapentin 300 MG capsule    NEURONTIN    90 capsule    Take 1 tablet (300 mg) every night for 1-3 days, then 1 tablet twice daily for 1-3 days, then 1 tablet three times daily    Radicular pain of both lower extremities, DDD (degenerative disc disease), lumbar       ondansetron 4 MG tablet    ZOFRAN      Take 1 Tab by mouth every 8 (eight) hours as needed for Nausea & Vomiting.        * oxyCODONE 15 MG 12 hr tablet    OxyCONTIN     Take 1 tablet (15 mg) by mouth Twice a Day.        * oxyCODONE IR 5 MG tablet    ROXICODONE     Take 2-3 tablets (10-15 mg) by mouth every 3 (three) hours as needed (Severe pain).        * oxyCODONE 15 MG 12 hr tablet    OxyCONTIN    30 tablet    Take 1 tablet (15 mg) by mouth every 12 hours    DDD (degenerative disc disease), lumbar, Radicular pain of both lower extremities       ranitidine 150 MG tablet    ZANTAC    180 tablet    TAKE 1 TABLET(150 MG) BY MOUTH TWICE DAILY    Dyspepsia       * Notice:  This list has 3 medication(s) that are the same as other medications prescribed for you. Read the directions carefully, and ask your doctor or other care provider to review them with you.

## 2018-01-02 NOTE — PROGRESS NOTES
SUBJECTIVE:   Xiomara Doherty is a 27 year old female who presents to clinic today for the following health issues:    Hospital Follow-up Visit:    Hospital/Nursing Home/IP Rehab Facility: [facility]  Date of Admission: 12/26/17  Date of Discharge: 12/28/17  Reason(s) for Admission: lower back pain            Problems taking medications regularly:  None       Medication changes since discharge: None       Problems adhering to non-medication therapy:  None    Summary of hospitalization:  CareEverywhere information obtained and reviewed  HOSPITAL COURSE:   This 27 y.o. female was admitted on 12/26/2017 to the [facility]. Please see the history and physical for events leading up to the hospitalization of the patient.  ASSESSMENT AND PLAN  A 27-year-old female who presents with low back pain.     Acute low back pain with right paracentral disc protrusion at L4-L5 that appears unchanged compared with MRI 2015.  When necessary IV morphine was ordered along with when necessary oral oxycodone 10-15 mg every 3 hours when necessary pain.  Patient was also started on scheduled gabapentin 100 mg by mouth 3 times a day, scheduled Robaxin 750 mg by mouth 3 times a day, and a Lidoderm patch.  She was started on scheduled IV Decadron 4 mg every 6 hours. She was seen by physical therapy. On 12/27/17 the patient was started on scheduled OxyContin 15 mg twice a day due to her persistent pain.  Patient seen on evening of 12/27/17 due to request that she made with the nurse to leave the hospital. Patient was seen by Hunter VAZQUEZ physician assistant with with the spine team and the plan that was discussed with the patient was to have steroid injection of her back this morning. Unfortunately, the physician assistant then decided that steroid injection when not significantly improve the patient symptoms but did not tell her that until this morning. Patient was understandably upset but was seen by the physician assistant  this morning who informed her who informed her of the plan of treating her pain without injection. Patient wishes to discharge home. Fortunately her pain was controlled on oral pain medications and she was discharged home with 4 tablets of OxyContin along with oral oxycodone and other medications as listed below. Patient will also have outpatient physical therapy.       Diagnostic Tests/Treatments reviewed.  Follow up needed: spine specialist   Other Healthcare Providers Involved in Patient s Care:         None  Update since discharge: improved.     Post Discharge Medication Reconciliation: discharge medications reconciled and changed, per note/orders (see AVS).  Plan of care communicated with patient     Coding guidelines for this visit:  Type of Medical   Decision Making Face-to-Face Visit       within 7 Days of discharge Face-to-Face Visit        within 14 days of discharge   Moderate Complexity 11594 [id number]   High Complexity 86128 75481        Here for follow up of back pain   Incident happened on 12/22 and went into ER 12/26  Was discharged on 12/28 and Medrol Pack, oxycodone,valium   Had on gabapentin in the hospital for only a couple days   Was discussion of spinal injection but then decided against   Was discussion of referral orthopedics but no referral in place   Was discussion if this keeps happening then need to consider surgery she stated   Worse is sitting or trying to get into a vehicle   Had a difficulty time with getting pain under control  Was sent home on 4 pills of the Oxycontin and that did help      Problem list and histories reviewed & adjusted, as indicated.  Additional history: as documented    Patient Active Problem List   Diagnosis     Menorrhagia     Back pain     Urinary frequency     Adjustment disorder with anxiety     Generalized anxiety disorder     Psoriasis     Esophageal reflux     New daily persistent headache     Post-concussion headache     Past Surgical History:   Procedure  Laterality Date     ARTHROSCOPY KNEE  7/26/2011    Procedure:ARTHROSCOPY KNEE; diagnostic; Surgeon:VIOLETTA JIM; Location:[facility] OR     ARTHROSCOPY KNEE RT/LT  10/20/09    right knee     ARTHROSCOPY KNEE WITH RETINACULAR RELEASE  7/26/2011    Procedure:ARTHROSCOPY KNEE WITH RETINACULAR RELEASE; lateral release; Surgeon:VIOLETTA JIM; Location:[facility] OR     BIOPSY       COLONOSCOPY  06/21/12    [facility]     COLONOSCOPY WITH CO2 INSUFFLATION N/A 10/29/2014    Procedure: COLONOSCOPY WITH CO2 INSUFFLATION;  Surgeon: Duane, William Charles, MD;  Location: [facility] OR     ESOPHAGOSCOPY, GASTROSCOPY, DUODENOSCOPY (EGD), COMBINED  2/3/2011    COMBINED ESOPHAGOSCOPY, GASTROSCOPY, DUODENOSCOPY (EGD), BIOPSY SINGLE OR MULTIPLE performed by DINAH VALDEZ at [facility]     ESOPHAGOSCOPY, GASTROSCOPY, DUODENOSCOPY (EGD), COMBINED N/A 10/29/2014    Procedure: COMBINED ESOPHAGOSCOPY, GASTROSCOPY, DUODENOSCOPY (EGD), BIOPSY SINGLE OR MULTIPLE;  Surgeon: Duane, William Charles, MD;  Location: [facility] OR     HC REMOVE TONSILS/ADENOIDS,<13 Y/O  1997      UGI ENDOSCOPY, SIMPLE EXAM  06/21/12    [facility]     IRRIGATION AND DEBRIDEMENT KNEE, COMBINED  7/26/2011    Procedure:COMBINED IRRIGATION AND DEBRIDEMENT KNEE; dedridement; Surgeon:VIOLETTA JIM; Location:[facility] OR     SOFT TISSUE SURGERY         Social History   Substance Use Topics     Smoking status: Never Smoker     Smokeless tobacco: Never Used     Alcohol use Yes      Comment: Maybe once a week--none with pregnancy     Family History   Problem Relation Age of Onset     CANCER Father      skin cancer on face     Hypertension Father      Hypertension Paternal Grandfather      Prostate Cancer Maternal Grandfather      Asthma Sister      sports         Current Outpatient Prescriptions   Medication Sig Dispense Refill     oxyCODONE (OXYCONTIN) 15 MG 12 hr tablet Take 1 tablet (15 mg) by mouth Twice a Day.       oxyCODONE IR (ROXICODONE) 5 MG tablet Take 2-3 tablets (10-15 mg) by mouth every 3  "(three) hours as needed (Severe pain).       diazepam (VALIUM) 2 MG tablet Take 1-2 tablets (2-4 mg) by mouth every 6 (six) hours as needed (Spasms).       amitriptyline (ELAVIL) 10 MG tablet Take 2 tablets by mouth at bedtime as needed.       ranitidine (ZANTAC) 150 MG tablet TAKE 1 TABLET(150 MG) BY MOUTH TWICE DAILY 180 tablet 1     ondansetron (ZOFRAN) 4 MG tablet Take 1 Tab by mouth every 8 (eight) hours as needed for Nausea & Vomiting.       Allergies   Allergen Reactions     Nuts Shortness Of Breath     Pineapple Shortness Of Breath and Anaphylaxis     Pt. Allergic to pineapple     Fentanyl Itching     Vicodin [Hydrocodone-Acetaminophen] Nausea and Vomiting     Labs reviewed in EPIC      Reviewed and updated as needed this visit by clinical staffTobacco  Meds  Med Hx  Surg Hx  Fam Hx  Soc Hx      Reviewed and updated as needed this visit by Provider         ROS:  CONSTITUTIONAL:NEGATIVE for fever, chills, change in weight  RESP:NEGATIVE for significant cough or SOB  CV: NEGATIVE for chest pain, palpitations or peripheral edema  GI: NEGATIVE for nausea, poor appetite, vomiting and weight loss  MUSCULOSKELETAL: POSITIVE  for back pain , paresthesias and radicular pain  and NEGATIVE for joint swelling  and joint warmth   NEURO: POSITIVE for paresthesias  and radicular pain  and NEGATIVE for involuntary movements, gait disturbance and syncope    OBJECTIVE:     /70 (BP Location: Right arm, Patient Position: Sitting, Cuff Size: Adult Regular)  Pulse 114  Temp 97.2  F (36.2  C) (Temporal)  Ht 5' 4\" (1.626 m)  Wt 131 lb 3.2 oz (59.5 kg)  SpO2 100%  BMI 22.52 kg/m2  Body mass index is 22.52 kg/(m^2).  GENERAL APPEARANCE: alert, active and mild distress  RESP: lungs clear to auscultation - no rales, rhonchi or wheezes  CV: regular rates and rhythm and no murmur, click or rub  MS: extremities normal- no gross deformities noted and peripheral pulses normal  ORTHO: Patient appears to be in moderate pain, " antalgic gait noted. Lumbosacral spine area reveals no local tenderness or mass.  Painful and reduced LS ROM noted.Unable to get on the exam table due to pain with movement. negative findings: no skin lesions, erythema, or scars, positive findings: limitation of motion - flexion: Marked and extension: Marked,general movements in the exam room are impaired     SKIN: no suspicious lesions or rashes  NEURO: Normal strength and tone, mentation intact and speech normal  PSYCH: mentation appears normal and affect normal/bright    Diagnostic Test Results:  MRI SPINE LUMBAR W/O CON12/26/2017  [facility]   Result Impression   IMPRESSION:     1.  Unchanged right paracentral disc protrusion at L4-5 compared to November 2015. It causes slight spinal canal narrowing and approximates but does not displace the right L5 nerve root.    2.  No new finding elsewhere.   Result Narrative   EXAM: MRI LUMBAR SPINE WITHOUT CONTRAST, 26 December 2017    CLINICAL DATA: Severe low back pain. Cannot sit up or walk. Cannot get exam. History of disc herniation.    COMPARISON: 1 November 2015.    TECHNIQUE: Sagittal T1 FLAIR, T2, fat sat T2; axial T1, T2    FINDINGS: The alignment and curvature remain normal. There is no fracture, spondylolysis, or destructive bone lesion. Normal marrow signal.    The conus ends at L1-2. The lower spinal cord is normal.    Findings at specific interspaces:    L1-2: Normal.    L2-3: Normal.    L3-4: Normal.    L4-5: The disc is desiccated but retains its height. Broad-based right paracentral protrusion is unchanged in size and appearance, extending up to 5 mm beyond the normal posterior disc margin. It indents the ventral thecal sac and closely approximates the right L5 nerve root above its lateral recess without displacing it. Very mild spinal canal narrowing. Adequate neural foramina.    L5-S1: Normal.    No paraspinal soft tissue abnormality.       ASSESSMENT/PLAN:       Xiomara was seen today for  hospital f/u.    Diagnoses and all orders for this visit:    DDD (degenerative disc disease), lumbar  -     SPINE SURGERY REFERRAL  -     gabapentin (NEURONTIN) 300 MG capsule; Take 1 tablet (300 mg) every night for 1-3 days, then 1 tablet twice daily for 1-3 days, then 1 tablet three times daily  -     oxyCODONE (OXYCONTIN) 15 MG 12 hr tablet; Take 1 tablet (15 mg) by mouth every 12 hours    Radicular pain of both lower extremities  -     gabapentin (NEURONTIN) 300 MG capsule; Take 1 tablet (300 mg) every night for 1-3 days, then 1 tablet twice daily for 1-3 days, then 1 tablet three times daily  -     oxyCODONE (OXYCONTIN) 15 MG 12 hr tablet; Take 1 tablet (15 mg) by mouth every 12 hours    PLAN:   Will restart gabapentin  Will refill oxycontin   Will [facility] referral [facility] for her back pain   Need to start physical therapy but at this point will let ortho evaluate particularly since there was some discussion about need for injection       See Patient Instructions    KEL Goldstein [facility]

## 2018-01-02 NOTE — PATIENT INSTRUCTIONS
PLAN:   1.   Symptomatic therapy suggested: start back on gabapentin  Has been on valium for the muscle spasms    2.  Orders Placed This Encounter   Medications     oxyCODONE (OXYCONTIN) 15 MG 12 hr tablet     Sig: Take 1 tablet (15 mg) by mouth Twice a Day.     oxyCODONE IR (ROXICODONE) 5 MG tablet     Sig: Take 2-3 tablets (10-15 mg) by mouth every 3 (three) hours as needed (Severe pain).     diazepam (VALIUM) 2 MG tablet     Sig: Take 1-2 tablets (2-4 mg) by mouth every 6 (six) hours as needed (Spasms).     amitriptyline (ELAVIL) 10 MG tablet     Sig: Take 2 tablets by mouth at bedtime as needed.     gabapentin (NEURONTIN) 300 MG capsule     Sig: Take 1 tablet (300 mg) every night for 1-3 days, then 1 tablet twice daily for 1-3 days, then 1 tablet three times daily     Dispense:  90 capsule     Refill:  0     oxyCODONE (OXYCONTIN) 15 MG 12 hr tablet     Sig: Take 1 tablet (15 mg) by mouth every 12 hours     Dispense:  30 tablet     Refill:  0     Orders Placed This Encounter   Procedures     SPINE SURGERY REFERRAL       3. Patient needs to follow up in if no improvement,or sooner if worsening of symptoms or other symptoms develop.  CONSULTATION/REFERRAL to spine specialists       It was a pleasure seeing you today at the [facility]. Thank you for allowing us to care for you today. We truly hope we provided you with the excellent service you deserve. Please let us know if there is anything else we can do for you so we can be sure you are leaving completley satisfied with your care experience.       General information about your clinic   Clinic Hours Lab Hours (Appointments are required)   Mon-Thurs: 7:30 AM - 7 PM Mon-Thurs: 7:30 AM - 7 PM   Fri: 7:30 AM - 5 PM Fri: 7:30 AM - 5 PM        After Hours Nurse Advise & Appts:  [facility]visors: [phone]  [facility] On Call: to make appointments anytime: [phone] On Call Physician: call [phone] and answering service  will page the on call physician.        For urgent appointments, please call [phone] and ask for the triage nurse or your care team clinic nurse.  How to contact my care team:  Ivan: [email or web address]   Phone: [phone]   Fax: [phone]       [facility]:   Phone: [phone]  Hours: 8:00 AM - 6:00 PM  Medication Refills:  Call your pharmacy and they will forward the refill to us. Please allow 3 business days for your refills to be completed.       Normal or non-critical lab and imaging results will be communicated to you by MyChart, letter or phone within 7 days.  If you do not hear from us within 10 days, please call the clinic. If you have a critical or abnormal lab result, we will notify you by phone as soon as possible.       We now have PWIC (Pediatric Walk in Care)  Monday-Friday from 7:30-4. Simply walk in and be seen for your urgent needs like cough, fever, rash, diarrhea or vomiting, pink eye, UTI. No appointments needed. Ask one of the team for more information      -Your Care Team:    Dr. Barbara Foley - Internal Medicine/Pediatrics   Dr. Chandana Boone - Family Medicine  Dr. Aleida Medel - Pediatrics  Dr. Mone Barnett - Pediatrics  Eneida Aviles CNP - Family Practice Nurse Practitioner

## 2018-03-21 ENCOUNTER — OFFICE VISIT (OUTPATIENT)
Dept: OBGYN | Facility: CLINIC | Age: 28
End: 2018-03-21
Payer: COMMERCIAL

## 2018-03-21 VITALS
HEART RATE: 76 BPM | TEMPERATURE: 97.5 F | OXYGEN SATURATION: 98 % | HEIGHT: 65 IN | BODY MASS INDEX: 22.81 KG/M2 | DIASTOLIC BLOOD PRESSURE: 73 MMHG | SYSTOLIC BLOOD PRESSURE: 115 MMHG | WEIGHT: 136.9 LBS

## 2018-03-21 DIAGNOSIS — Z01.419 ENCOUNTER FOR GYNECOLOGICAL EXAMINATION WITHOUT ABNORMAL FINDING: Primary | ICD-10-CM

## 2018-03-21 DIAGNOSIS — N97.9 FEMALE INFERTILITY: ICD-10-CM

## 2018-03-21 LAB
ESTRADIOL SERPL-MCNC: 39 PG/ML
FSH SERPL-ACNC: 7.4 IU/L
LH SERPL-ACNC: 3.6 IU/L
PROLACTIN SERPL-MCNC: 5 UG/L (ref 3–27)
TSH SERPL DL<=0.005 MIU/L-ACNC: 0.76 MU/L (ref 0.4–4)

## 2018-03-21 PROCEDURE — 83002 ASSAY OF GONADOTROPIN (LH): CPT | Performed by: OBSTETRICS & GYNECOLOGY

## 2018-03-21 PROCEDURE — 36415 COLL VENOUS BLD VENIPUNCTURE: CPT | Performed by: OBSTETRICS & GYNECOLOGY

## 2018-03-21 PROCEDURE — 84443 ASSAY THYROID STIM HORMONE: CPT | Performed by: OBSTETRICS & GYNECOLOGY

## 2018-03-21 PROCEDURE — 84270 ASSAY OF SEX HORMONE GLOBUL: CPT | Performed by: OBSTETRICS & GYNECOLOGY

## 2018-03-21 PROCEDURE — G0145 SCR C/V CYTO,THINLAYER,RESCR: HCPCS | Performed by: OBSTETRICS & GYNECOLOGY

## 2018-03-21 PROCEDURE — 84146 ASSAY OF PROLACTIN: CPT | Performed by: OBSTETRICS & GYNECOLOGY

## 2018-03-21 PROCEDURE — 84403 ASSAY OF TOTAL TESTOSTERONE: CPT | Performed by: OBSTETRICS & GYNECOLOGY

## 2018-03-21 PROCEDURE — 82670 ASSAY OF TOTAL ESTRADIOL: CPT | Performed by: OBSTETRICS & GYNECOLOGY

## 2018-03-21 PROCEDURE — 99213 OFFICE O/P EST LOW 20 MIN: CPT | Mod: 25 | Performed by: OBSTETRICS & GYNECOLOGY

## 2018-03-21 PROCEDURE — 83001 ASSAY OF GONADOTROPIN (FSH): CPT | Performed by: OBSTETRICS & GYNECOLOGY

## 2018-03-21 PROCEDURE — 99395 PREV VISIT EST AGE 18-39: CPT | Performed by: OBSTETRICS & GYNECOLOGY

## 2018-03-21 NOTE — NURSING NOTE
"Chief Complaint   Patient presents with     Physical       Initial /73  Pulse 76  Temp 97.5  F (36.4  C) (Oral)  Ht 5' 5.2\" (1.656 m)  Wt 136 lb 14.4 oz (62.1 kg)  LMP 03/15/2018  SpO2 98%  BMI 22.64 kg/m2 Estimated body mass index is 22.64 kg/(m^2) as calculated from the following:    Height as of this encounter: 5' 5.2\" (1.656 m).    Weight as of this encounter: 136 lb 14.4 oz (62.1 kg).  Medication Reconciliation: complete   Aleena Steward CMA      "

## 2018-03-21 NOTE — PATIENT INSTRUCTIONS
If you have any questions regarding your visit, Please contact your care team.    Women s Health CLINIC HOURS TELEPHONE NUMBER   MD Aleena Perkins CMA Lisa -    BRIDGETT Goff       Monday:       7:30-4:30 [place]  Wednesday:       7:30-4:30 [place]  Thursday:       7:30-1:30 [place]  Friday:       7:30-11:30 [facility]  [address] [place]  [address]  [phone] ask for [facility]  [address] [place] [address]  [phone] ask for [facility]    Imaging Scheduling for [place]:  [phone]    Imaging Scheduling for [place]: [phone]       Urgent Care locations:    [facility] Saturday and Sunday   9 am - 5 pm    Monday-Friday   12 pm - 8 pm  Saturday and Sunday   9 am - 5 pm   [phone]     [facility] Labor and Delivery:  [phone]    If you need a medication refill, please contact your pharmacy. Please allow 3 business days for your refill to be completed.  As always, Thank you for trusting us with your healthcare needs!
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
## 2018-03-21 NOTE — MR AVS SNAPSHOT
After Visit Summary   3/21/2018    Xiomara Doherty    MRN: [id number]           Patient Information     Date Of Birth          1990        Visit Information        Provider Department      3/21/2018 10:15 AM Rafi Shaw MD [facility]        Today's Diagnoses     Encounter for gynecological examination without abnormal finding    -  1    Female infertility          Care Instructions                                                         If you have any questions regarding your visit, Please contact your care team.    Women s Health CLINIC HOURS TELEPHONE NUMBER   MD Aleena Perkins CMA Lisa -    BRIDGETT Goff       Monday:       7:30-4:30 [place]  Wednesday:       7:30-4:30 [place]  Thursday:       7:30-1:30 [place]  Friday:       7:30-11:30 [facility]  [address] [place]  [address] [phone] ask for Women's Clinic    [facility]  [address] [place] [address] [phone] ask for [facility]    Imaging Scheduling for [place]:  [phone]    Imaging Scheduling for [place]: [phone]       Urgent Care locations:    [facility] Saturday and Sunday   9 am - 5 pm    Monday-Friday   12 pm - 8 pm  Saturday and Sunday   9 am - 5 pm   [phone]     [facility] Labor and Delivery:  [phone]    If you need a medication refill, please contact your pharmacy. Please allow 3 business days for your refill to be completed.  As always, Thank you for trusting us with your healthcare needs!              Follow-ups after your visit        Your next 10 appointments already scheduled     Apr 04, 2018 10:00 AM CDT   Lab visit with LAB FIRST FLOOR [facility] ([facility])    [address] [facility] [address] [phone]           Please do not eat 10-12 hours before your  "appointment if you are coming in fasting for labs on lipids, cholesterol, or glucose (sugar). Does not apply to pregnant women.  Water with medications is okay. Do not drink coffee or other fluids.  If you have concerns about taking your medications, please send a message by clicking on Secure Messaging, Message Your Care Team.              Who to contact     If you have questions or need follow up information about today's clinic visit or your schedule please contact [facility] directly at [phone].  Normal or non-critical lab and imaging results will be communicated to you by [employer], letter or phone within 4 business days after the clinic has received the results. If you do not hear from us within 7 days, please contact the clinic through [employer] or phone. If you have a critical or abnormal lab result, we will notify you by phone as soon as possible.  Submit refill requests through [employer] or call your pharmacy and they will forward the refill request to us. Please allow 3 business days for your refill to be completed.          Additional Information About Your Visit        [employer] Information     [employer] gives you secure access to your electronic health record. If you see a primary care provider, you can also send messages to your care team and make appointments. If you have questions, please call your primary care clinic.  If you do not have a primary care provider, please call [phone] and they will assist you.        Care EveryWhere ID     This is your Care EveryWhere ID. This could be used by other organizations to access your [place] medical records  [id number]        Your Vitals Were     Pulse Temperature Height Last Period Pulse Oximetry BMI (Body Mass Index)    76 97.5  F (36.4  C) (Oral) 5' 5.2\" (1.656 m) 03/15/2018 98% 22.64 kg/m2       Blood Pressure from Last 3 Encounters:   03/21/18 115/73   01/02/18 115/70   08/23/17 109/74    Weight from Last 3 Encounters:   03/21/18 " 136 lb 14.4 oz (62.1 kg)   01/02/18 131 lb 3.2 oz (59.5 kg)   08/23/17 138 lb 12.8 oz (63 kg)              We Performed the Following     Estradiol     Follicle stimulating hormone     Lutropin     Pap imaged thin layer screen reflex to HPV if ASCUS - recommend age 25 - 29     Prolactin     Testosterone Free and Total     TSH with free T4 reflex        Primary Care Provider Office Phone # Fax #    Eneida Aviles, APRN [phone]       [address]  [place] [address]        Equal Access to Services     [facility] Center[facility]IRA : Hadii aad ku hadasho Soomaali, waaxda luqadaha, qaybta kaalmada adeegyalayton, yovani garvin . So [facility] [phone].    ATENCIÓN: Si habla español, tiene a lindquist disposición servicios gratuitos de asistencia lingüística. LlLicking [facility] [phone].    We comply with applicable federal civil rights laws and [place] laws. We do not discriminate on the basis of race, color, national origin, age, disability, sex, sexual orientation, or gender identity.            Thank you!     Thank you for choosing [facility]  for your care. Our goal is always to provide you with excellent care. Hearing back from our patients is one way we can continue to improve our services. Please take a few minutes to complete the written survey that you may receive in the mail after your visit with us. Thank you!             Your Updated Medication List - Protect others around you: Learn how to safely use, store and throw away your medicines at www.disposemymeds.org.          This list is accurate as of 3/21/18 11:59 PM.  Always use your most recent med list.                   Brand Name Dispense Instructions for use Diagnosis    amitriptyline 10 MG tablet    ELAVIL     Take 2 tablets by mouth at bedtime as needed.        diazepam 2 MG tablet    VALIUM    10 tablet    Take 1-2 tablets (2-4 mg) by mouth every 6 (six) hours as needed (Spasms).    Bilateral low  back pain with right-sided sciatica, unspecified chronicity       gabapentin 300 MG capsule    NEURONTIN    90 capsule    Take 1 tablet (300 mg) every night for 1-3 days, then 1 tablet twice daily for 1-3 days, then 1 tablet three times daily    Radicular pain of both lower extremities, DDD (degenerative disc disease), lumbar       ondansetron 4 MG tablet    ZOFRAN     Take 1 Tab by mouth every 8 (eight) hours as needed for Nausea & Vomiting.        * oxyCODONE 15 MG 12 hr tablet    OxyCONTIN     Take 1 tablet (15 mg) by mouth Twice a Day.        * oxyCODONE IR 5 MG tablet    ROXICODONE     Take 2-3 tablets (10-15 mg) by mouth every 3 (three) hours as needed (Severe pain).        * oxyCODONE 15 MG 12 hr tablet    OxyCONTIN    30 tablet    Take 1 tablet (15 mg) by mouth every 12 hours    DDD (degenerative disc disease), lumbar, Radicular pain of both lower extremities       ranitidine 150 MG tablet    ZANTAC    180 tablet    TAKE 1 TABLET(150 MG) BY MOUTH TWICE DAILY    Dyspepsia       * Notice:  This list has 3 medication(s) that are the same as other medications prescribed for you. Read the directions carefully, and ask your doctor or other care provider to review them with you.

## 2018-03-22 ASSESSMENT — PATIENT HEALTH QUESTIONNAIRE - PHQ9: SUM OF ALL RESPONSES TO PHQ QUESTIONS 1-9: 2

## 2018-03-23 LAB
COPATH REPORT: NORMAL
PAP: NORMAL
SHBG SERPL-SCNC: 40 NMOL/L (ref 30–135)
TESTOST FREE SERPL-MCNC: 0.25 NG/DL (ref 0.08–0.74)
TESTOST SERPL-MCNC: 16 NG/DL (ref 8–60)

## 2018-04-04 DIAGNOSIS — N97.9 FEMALE INFERTILITY: ICD-10-CM

## 2018-04-04 LAB — PROGEST SERPL-MCNC: 15.2 NG/ML

## 2018-04-04 PROCEDURE — 84144 ASSAY OF PROGESTERONE: CPT | Performed by: OBSTETRICS & GYNECOLOGY

## 2018-04-04 PROCEDURE — 36415 COLL VENOUS BLD VENIPUNCTURE: CPT | Performed by: OBSTETRICS & GYNECOLOGY

## 2018-07-18 ENCOUNTER — PRENATAL OFFICE VISIT (OUTPATIENT)
Dept: OBGYN | Facility: CLINIC | Age: 28
End: 2018-07-18
Payer: COMMERCIAL

## 2018-07-18 VITALS
OXYGEN SATURATION: 97 % | HEART RATE: 84 BPM | DIASTOLIC BLOOD PRESSURE: 74 MMHG | WEIGHT: 133.4 LBS | HEIGHT: 65 IN | SYSTOLIC BLOOD PRESSURE: 115 MMHG | BODY MASS INDEX: 22.23 KG/M2

## 2018-07-18 DIAGNOSIS — Z34.80 SUPERVISION OF OTHER NORMAL PREGNANCY, ANTEPARTUM: Primary | ICD-10-CM

## 2018-07-18 PROBLEM — Z29.13 NEED FOR RHOGAM DUE TO RH NEGATIVE MOTHER: Status: ACTIVE | Noted: 2018-07-18

## 2018-07-18 LAB
ABO + RH BLD: NORMAL
ABO + RH BLD: NORMAL
BASOPHILS # BLD AUTO: 0 10E9/L (ref 0–0.2)
BASOPHILS NFR BLD AUTO: 0.3 %
BLD GP AB SCN SERPL QL: NORMAL
BLOOD BANK CMNT PATIENT-IMP: NORMAL
DIFFERENTIAL METHOD BLD: ABNORMAL
EOSINOPHIL # BLD AUTO: 0.1 10E9/L (ref 0–0.7)
EOSINOPHIL NFR BLD AUTO: 0.4 %
ERYTHROCYTE [DISTWIDTH] IN BLOOD BY AUTOMATED COUNT: 11.9 % (ref 10–15)
HCT VFR BLD AUTO: 39.5 % (ref 35–47)
HGB BLD-MCNC: 13.7 G/DL (ref 11.7–15.7)
IMM GRANULOCYTES # BLD: 0.1 10E9/L (ref 0–0.4)
IMM GRANULOCYTES NFR BLD: 0.5 %
LYMPHOCYTES # BLD AUTO: 1.7 10E9/L (ref 0.8–5.3)
LYMPHOCYTES NFR BLD AUTO: 13.9 %
MCH RBC QN AUTO: 32.2 PG (ref 26.5–33)
MCHC RBC AUTO-ENTMCNC: 34.7 G/DL (ref 31.5–36.5)
MCV RBC AUTO: 93 FL (ref 78–100)
MONOCYTES # BLD AUTO: 0.7 10E9/L (ref 0–1.3)
MONOCYTES NFR BLD AUTO: 5.7 %
NEUTROPHILS # BLD AUTO: 9.5 10E9/L (ref 1.6–8.3)
NEUTROPHILS NFR BLD AUTO: 79.2 %
PLATELET # BLD AUTO: 306 10E9/L (ref 150–450)
RBC # BLD AUTO: 4.25 10E12/L (ref 3.8–5.2)
SPECIMEN EXP DATE BLD: NORMAL
WBC # BLD AUTO: 12 10E9/L (ref 4–11)

## 2018-07-18 PROCEDURE — 86780 TREPONEMA PALLIDUM: CPT | Performed by: OBSTETRICS & GYNECOLOGY

## 2018-07-18 PROCEDURE — 87491 CHLMYD TRACH DNA AMP PROBE: CPT | Performed by: OBSTETRICS & GYNECOLOGY

## 2018-07-18 PROCEDURE — 85025 COMPLETE CBC W/AUTO DIFF WBC: CPT | Performed by: OBSTETRICS & GYNECOLOGY

## 2018-07-18 PROCEDURE — 36415 COLL VENOUS BLD VENIPUNCTURE: CPT | Performed by: OBSTETRICS & GYNECOLOGY

## 2018-07-18 PROCEDURE — 99207 ZZC FIRST OB VISIT: CPT | Performed by: OBSTETRICS & GYNECOLOGY

## 2018-07-18 PROCEDURE — 86850 RBC ANTIBODY SCREEN: CPT | Performed by: OBSTETRICS & GYNECOLOGY

## 2018-07-18 PROCEDURE — 86900 BLOOD TYPING SEROLOGIC ABO: CPT | Performed by: OBSTETRICS & GYNECOLOGY

## 2018-07-18 PROCEDURE — 87340 HEPATITIS B SURFACE AG IA: CPT | Performed by: OBSTETRICS & GYNECOLOGY

## 2018-07-18 PROCEDURE — 87591 N.GONORRHOEAE DNA AMP PROB: CPT | Performed by: OBSTETRICS & GYNECOLOGY

## 2018-07-18 PROCEDURE — 87389 HIV-1 AG W/HIV-1&-2 AB AG IA: CPT | Performed by: OBSTETRICS & GYNECOLOGY

## 2018-07-18 PROCEDURE — 87086 URINE CULTURE/COLONY COUNT: CPT | Performed by: OBSTETRICS & GYNECOLOGY

## 2018-07-18 PROCEDURE — 86762 RUBELLA ANTIBODY: CPT | Performed by: OBSTETRICS & GYNECOLOGY

## 2018-07-18 PROCEDURE — 86901 BLOOD TYPING SEROLOGIC RH(D): CPT | Performed by: OBSTETRICS & GYNECOLOGY

## 2018-07-18 RX ORDER — PRENATAL VIT/IRON FUM/FOLIC AC 27MG-0.8MG
1 TABLET ORAL DAILY
COMMUNITY
End: 2019-01-04

## 2018-07-18 NOTE — MR AVS SNAPSHOT
After Visit Summary   7/18/2018    Xiomara Doherty    MRN: [id number]           Patient Information     Date Of Birth          1990        Visit Information        Provider Department      7/18/2018 10:00 AM Rafi Shaw MD [facility]        Today's Diagnoses     Supervision of other normal pregnancy, antepartum    -  1       Follow-ups after your visit        Follow-up notes from your care team     Return in about 4 weeks (around 8/15/2018) for Prenatal Visit.      Your next 10 appointments already scheduled     Sep 19, 2018  9:15 AM CDT   US OB > 14 WEEKS COMPLETE SINGLE with MG KIKI [facility] ([facility]    [address] [place] [address] [phone]           Please bring a list of your medicines (including vitamins, minerals and over-the-counter drugs). Also, tell your doctor about any allergies you may have. Wear comfortable clothes and leave your valuables at home.  Drink four 8-ounce glasses of fluid an hour before your exam. If you need to empty your bladder before your exam, try to release only a little urine. Then, drink another glass of fluid.  You may have up to two family members in the exam room. If you bring a small child, an adult must be there to care for him or her. No video or camera photography during the procedure.  Please call the Imaging Department at your exam site with any questions.            Sep 19, 2018 10:15 AM CDT   ESTABLISHED PRENATAL with Rafi Shaw MD   [facility] ([facility]    [address] [place] [address] [phone]              Future tests that were ordered for you today     Open Future Orders        Priority Expected Expires Ordered    US OB > 14 Weeks Complete Single Routine  8/15/2019 8/15/2018            Who to contact     If you have questions or need follow up information about today's clinic  "visit or your schedule please contact [facility] directly at [phone].  Normal or non-critical lab and imaging results will be communicated to you by MyChart, letter or phone within 4 business days after the clinic has received the results. If you do not hear from us within 7 days, please contact the clinic through [employer] or phone. If you have a critical or abnormal lab result, we will notify you by phone as soon as possible.  Submit refill requests through [employer] or call your pharmacy and they will forward the refill request to us. Please allow 3 business days for your refill to be completed.          Additional Information About Your Visit        [employer] Information     [employer] gives you secure access to your electronic health record. If you see a primary care provider, you can also send messages to your care team and make appointments. If you have questions, please call your primary care clinic.  If you do not have a primary care provider, please call [phone] and they will assist you.        Care EveryWhere ID     This is your Care EveryWhere ID. This could be used by other organizations to access your [place] medical records  [id number]        Your Vitals Were     Pulse Height Last Period Pulse Oximetry BMI (Body Mass Index)       84 5' 5.1\" (1.654 m) 05/10/2018 (Exact Date) 97% 22.13 kg/m2        Blood Pressure from Last 3 Encounters:   08/15/18 113/67   07/18/18 115/74   03/21/18 115/73    Weight from Last 3 Encounters:   08/15/18 133 lb (60.3 kg)   07/18/18 133 lb 6.4 oz (60.5 kg)   03/21/18 136 lb 14.4 oz (62.1 kg)              We Performed the Following     ABO/Rh type and screen     CBC with platelets differential     Chlamydia trachomatis PCR     Hepatitis B surface antigen     HIV Antigen Antibody Combo     Neisseria gonorrhoeae PCR     Rubella Antibody IgG Quantitative     Treponema Abs w Reflex to RPR and Titer     Urine Culture Aerobic Bacterial     US Bedside Non " Radiology        Primary Care Provider Office Phone # Fax #    Eneida Aviles, APRN [facility] [phone]       [address]  [place] [address]        Equal Access to Services     MICAELA DAVIES : Hadafua antony ku zainabo Somorrisali, waaxda luqadaha, qaybta kaalmada adeegyada, waxsreedhar pann suzanna robin bharathi thomas. So [facility] [place] [phone].    ATENCIÓN: Si habla español, tiene a lindquist disposición servicios gratuitos de asistencia lingüística. Llame al [phone].    We comply with applicable federal civil rights laws and [place] laws. We do not discriminate on the basis of race, color, national origin, age, disability, sex, sexual orientation, or gender identity.            Thank you!     Thank you for choosing [facility]  for your care. Our goal is always to provide you with excellent care. Hearing back from our patients is one way we can continue to improve our services. Please take a few minutes to complete the written survey that you may receive in the mail after your visit with us. Thank you!             Your Updated Medication List - Protect others around you: Learn how to safely use, store and throw away your medicines at www.disposemymeds.org.          This list is accurate as of 7/18/18 11:59 PM.  Always use your most recent med list.                   Brand Name Dispense Instructions for use Diagnosis    amitriptyline 10 MG tablet    ELAVIL     Take 2 tablets by mouth at bedtime as needed.        diazepam 2 MG tablet    VALIUM    10 tablet    Take 1-2 tablets (2-4 mg) by mouth every 6 (six) hours as needed (Spasms).    Bilateral low back pain with right-sided sciatica, unspecified chronicity       gabapentin 300 MG capsule    NEURONTIN    90 capsule    Take 1 tablet (300 mg) every night for 1-3 days, then 1 tablet twice daily for 1-3 days, then 1 tablet three times daily    Radicular pain of both lower extremities, DDD (degenerative disc disease), lumbar        ondansetron 4 MG tablet    ZOFRAN     Take 1 Tab by mouth every 8 (eight) hours as needed for Nausea & Vomiting.        * oxyCODONE 15 MG 12 hr tablet    OxyCONTIN     Take 1 tablet (15 mg) by mouth Twice a Day.        * oxyCODONE IR 5 MG tablet    ROXICODONE     Take 2-3 tablets (10-15 mg) by mouth every 3 (three) hours as needed (Severe pain).        * oxyCODONE 15 MG 12 hr tablet    OxyCONTIN    30 tablet    Take 1 tablet (15 mg) by mouth every 12 hours    DDD (degenerative disc disease), lumbar, Radicular pain of both lower extremities       prenatal multivitamin plus iron 27-0.8 MG Tabs per tablet      Take 1 tablet by mouth daily    Supervision of other normal pregnancy, antepartum       ranitidine 150 MG tablet    ZANTAC    180 tablet    TAKE 1 TABLET(150 MG) BY MOUTH TWICE DAILY    Dyspepsia       * Notice:  This list has 3 medication(s) that are the same as other medications prescribed for you. Read the directions carefully, and ask your doctor or other care provider to review them with you.
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
## 2018-07-19 LAB
BACTERIA SPEC CULT: NO GROWTH
C TRACH DNA SPEC QL NAA+PROBE: NEGATIVE
HBV SURFACE AG SERPL QL IA: NONREACTIVE
HIV 1+2 AB+HIV1 P24 AG SERPL QL IA: NONREACTIVE
N GONORRHOEA DNA SPEC QL NAA+PROBE: NEGATIVE
RUBV IGG SERPL IA-ACNC: 69 IU/ML
SPECIMEN SOURCE: NORMAL
T PALLIDUM AB SER QL: NONREACTIVE

## 2018-07-27 ENCOUNTER — MYC MEDICAL ADVICE (OUTPATIENT)
Dept: OBGYN | Facility: CLINIC | Age: 28
End: 2018-07-27

## 2018-07-30 NOTE — TELEPHONE ENCOUNTER
[facility]-  referral faxed to [facility] along with patient's prenatal chart.  M will contact patient within 3 business days to schedule.

## 2018-08-07 ENCOUNTER — TRANSFERRED RECORDS (OUTPATIENT)
Dept: HEALTH INFORMATION MANAGEMENT | Facility: CLINIC | Age: 28
End: 2018-08-07

## 2018-08-15 ENCOUNTER — PRENATAL OFFICE VISIT (OUTPATIENT)
Dept: OBGYN | Facility: CLINIC | Age: 28
End: 2018-08-15
Payer: COMMERCIAL

## 2018-08-15 VITALS
BODY MASS INDEX: 22.06 KG/M2 | SYSTOLIC BLOOD PRESSURE: 113 MMHG | WEIGHT: 133 LBS | DIASTOLIC BLOOD PRESSURE: 67 MMHG | OXYGEN SATURATION: 97 % | HEART RATE: 78 BPM

## 2018-08-15 DIAGNOSIS — Z34.80 SUPERVISION OF OTHER NORMAL PREGNANCY, ANTEPARTUM: Primary | ICD-10-CM

## 2018-08-15 PROCEDURE — 99207 ZZC PRENATAL VISIT: CPT | Performed by: OBSTETRICS & GYNECOLOGY

## 2018-08-15 NOTE — MR AVS SNAPSHOT
After Visit Summary   8/15/2018    Xiomara Doherty    MRN: [id number]           Patient Information     Date Of Birth          1990        Visit Information        Provider Department      8/15/2018 10:00 AM Rafi Shaw MD [facility]        Today's Diagnoses     Supervision of other normal pregnancy, antepartum    -  1       Follow-ups after your visit        Follow-up notes from your care team     Return in about 4 weeks (around 9/12/2018) for Prenatal Visit.      Your next 10 appointments already scheduled     Sep 19, 2018  9:15 AM CDT   US OB > 14 WEEKS COMPLETE SINGLE with [facility] ([facility])    [address] [place] [id number] [phone]           Please bring a list of your medicines (including vitamins, minerals and over-the-counter drugs). Also, tell your doctor about any allergies you may have. Wear comfortable clothes and leave your valuables at home.  Drink four 8-ounce glasses of fluid an hour before your exam. If you need to empty your bladder before your exam, try to release only a little urine. Then, drink another glass of fluid.  You may have up to two family members in the exam room. If you bring a small child, an adult must be there to care for him or her. No video or camera photography during the procedure.  Please call the Imaging Department at your exam site with any questions.            Sep 19, 2018 10:15 AM CDT   ESTABLISHED PRENATAL with Rafi Shaw MD   [facility] ([facility])    [id number] [place] [id number] [phone]              Future tests that were ordered for you today     Open Future Orders        Priority Expected Expires Ordered    US OB > 14 Weeks Complete Single Routine  8/15/2019 8/15/2018            Who to contact     If you have questions or need follow up information about today's clinic  visit or your schedule please contact [facility] directly at [phone].  Normal or non-critical lab and imaging results will be communicated to you by MyChart, letter or phone within 4 business days after the clinic has received the results. If you do not hear from us within 7 days, please contact the clinic through HomeSpacehart or phone. If you have a critical or abnormal lab result, we will notify you by phone as soon as possible.  Submit refill requests through [email or web address] or call your pharmacy and they will forward the refill request to us. Please allow 3 business days for your refill to be completed.          Additional Information About Your Visit        HomeSpacehart Information     [email or web address] gives you secure access to your electronic health record. If you see a primary care provider, you can also send messages to your care team and make appointments. If you have questions, please call your primary care clinic.  If you do not have a primary care provider, please call [phone] and they will assist you.        Care EveryWhere ID     This is your Care EveryWhere ID. This could be used by other organizations to access your [place] medical records  [id number]        Your Vitals Were     Pulse Last Period Pulse Oximetry Breastfeeding? BMI (Body Mass Index)       78 05/10/2018 (Exact Date) 97% No 22.06 kg/m2        Blood Pressure from Last 3 Encounters:   08/15/18 113/67   07/18/18 115/74   03/21/18 115/73    Weight from Last 3 Encounters:   08/15/18 133 lb (60.3 kg)   07/18/18 133 lb 6.4 oz (60.5 kg)   03/21/18 136 lb 14.4 oz (62.1 kg)                 Today's Medication Changes          These changes are accurate as of 8/15/18 10:49 AM.  If you have any questions, ask your nurse or doctor.               Stop taking these medicines if you haven't already. Please contact your care team if you have questions.     diazepam 2 MG tablet   Commonly known as:  VALIUM   Stopped by:  Rafi Shaw MD            gabapentin 300 MG capsule   Commonly known as:  NEURONTIN   Stopped by:  Rafi Shaw MD           oxyCODONE 15 MG 12 hr tablet   Commonly known as:  OxyCONTIN   Stopped by:  Rafi Shaw MD           oxyCODONE IR 5 MG tablet   Commonly known as:  ROXICODONE   Stopped by:  Rafi Shaw MD                    Primary Care Provider Office Phone # Fax #    Eneida Aviles, APRN [facility] [phone]       [address]  [place] [address]        Equal Access to Services     [facility]: Hadii aad ku hadasho Soomaali, waaxda luqadaha, qaybta kaalmada adeegyada, waxay idiin hayaan adeeg kharaodin garvin . So [facility] [phone].    ATENCIÓN: Si habla español, tiene a lindquist disposición servicios gratuitos de asistencia lingüística. Ll[facility] [phone].    We comply with applicable federal civil rights laws and [place] laws. We do not discriminate on the basis of race, color, national origin, age, disability, sex, sexual orientation, or gender identity.            Thank you!     Thank you for choosing [facility]  for your care. Our goal is always to provide you with excellent care. Hearing back from our patients is one way we can continue to improve our services. Please take a few minutes to complete the written survey that you may receive in the mail after your visit with us. Thank you!             Your Updated Medication List - Protect others around you: Learn how to safely use, store and throw away your medicines at www.disposemymeds.org.          This list is accurate as of 8/15/18 10:49 AM.  Always use your most recent med list.                   Brand Name Dispense Instructions for use Diagnosis    amitriptyline 10 MG tablet    ELAVIL     Take 2 tablets by mouth at bedtime as needed.        ondansetron 4 MG tablet    ZOFRAN     Take 1 Tab by mouth every 8 (eight) hours as needed for Nausea & Vomiting.        prenatal multivitamin plus iron 27-0.8 MG Tabs  per tablet      Take 1 tablet by mouth daily    Supervision of other normal pregnancy, antepartum       ranitidine 150 MG tablet    ZANTAC    180 tablet    TAKE 1 TABLET(150 MG) BY MOUTH TWICE DAILY    Dyspepsia

## 2018-08-15 NOTE — PROGRESS NOTES
Patient presents for routine prenatal visit at 13w6d.  Patient with complaint. She had an episode of post-coital spotting and has also had some chest wall pain  PE: See OB vitals  There is no height or weight on file to calculate BMI.  Chest:  Seems consistent with pleuritic pain.  SSE:  No bleeding, normal cervix  Questions asked and answered.      Rafi Shaw MD FACOG

## 2018-09-19 ENCOUNTER — PRENATAL OFFICE VISIT (OUTPATIENT)
Dept: OBGYN | Facility: CLINIC | Age: 28
End: 2018-09-19
Payer: COMMERCIAL

## 2018-09-19 ENCOUNTER — RADIANT APPOINTMENT (OUTPATIENT)
Dept: ULTRASOUND IMAGING | Facility: CLINIC | Age: 28
End: 2018-09-19
Attending: OBSTETRICS & GYNECOLOGY
Payer: COMMERCIAL

## 2018-09-19 VITALS
TEMPERATURE: 97.6 F | HEART RATE: 85 BPM | BODY MASS INDEX: 22.48 KG/M2 | SYSTOLIC BLOOD PRESSURE: 106 MMHG | OXYGEN SATURATION: 96 % | WEIGHT: 135.5 LBS | DIASTOLIC BLOOD PRESSURE: 63 MMHG

## 2018-09-19 DIAGNOSIS — Z29.13 NEED FOR RHOGAM DUE TO RH NEGATIVE MOTHER: ICD-10-CM

## 2018-09-19 DIAGNOSIS — Z34.80 SUPERVISION OF OTHER NORMAL PREGNANCY, ANTEPARTUM: ICD-10-CM

## 2018-09-19 DIAGNOSIS — Z34.80 SUPERVISION OF OTHER NORMAL PREGNANCY, ANTEPARTUM: Primary | ICD-10-CM

## 2018-09-19 PROCEDURE — 99207 ZZC PRENATAL VISIT: CPT | Performed by: OBSTETRICS & GYNECOLOGY

## 2018-09-19 PROCEDURE — 76805 OB US >/= 14 WKS SNGL FETUS: CPT

## 2018-09-19 NOTE — PATIENT INSTRUCTIONS
If you have any questions regarding your visit, Please contact your care team.    Women s Health CLINIC HOURS TELEPHONE NUMBER   MD Aleena Perkins CMA [place] -    BRIDGETT Goff       Monday:       7:30-4:30 [place]  Wednesday:       7:30-4:30 [place]  Thursday:       7:30-1:30 [place]  Friday:       7:30-11:30 [facility]  [address] [place]  [address]  [phone] ask for Women's [facility]  [address] [place] [address]  [phone] ask for [facility]    Imaging Scheduling for [place]:  [phone]    Imaging Scheduling for [place]: [phone]       Urgent Care locations:    [facility] Saturday and Sunday   9 am - 5 pm    Monday-Friday   12 pm - 8 pm  Saturday and Sunday   9 am - 5 pm   [phone]     [facility] Labor and Delivery:  [phone]    If you need a medication refill, please contact your pharmacy. Please allow 3 business days for your refill to be completed.  As always, Thank you for trusting us with your healthcare needs!

## 2018-09-19 NOTE — MR AVS SNAPSHOT
After Visit Summary   9/19/2018    Xiomara Doherty    MRN: [id number]           Patient Information     Date Of Birth          1990        Visit Information        Provider Department      9/19/2018 10:15 AM Rafi Shaw MD [facility]        Today's Diagnoses     Supervision of other normal pregnancy, antepartum    -  1    Need for rhogam due to Rh negative mother          Care Instructions                                                         If you have any questions regarding your visit, Please contact your care team.    Women s Health CLINIC HOURS TELEPHONE NUMBER   MD Aleena Perkins CMA [place] -    BRIDGETT Goff       Monday:       7:30-4:30 [place]  Wednesday:       7:30-4:30 [place]  Thursday:       7:30-1:30 [place]  Friday:       7:30-11:30 [facility]  [address] [place]  [address]  [phone] ask for [facility]  [address] [place] [address]  [phone] ask for [facility]    Imaging Scheduling for [place]:  [phone]    Imaging Scheduling for [place]: [phone]       Urgent Care locations:    [facility] Saturday and Sunday   9 am - 5 pm    Monday-Friday   12 pm - 8 pm  Saturday and Sunday   9 am - 5 pm   [phone]     [facility] Labor and Delivery:  [phone]    If you need a medication refill, please contact your pharmacy. Please allow 3 business days for your refill to be completed.  As always, Thank you for trusting us with your healthcare needs!              Follow-ups after your visit        Follow-up notes from your care team     Return in about 4 weeks (around 10/17/2018) for Prenatal Visit.      Who to contact     If you have questions or need follow up information about today's clinic visit or your schedule please contact [facility] directly at  [phone].  Normal or non-critical lab and imaging results will be communicated to you by MyChart, letter or phone within 4 business days after the clinic has received the results. If you do not hear from us within 7 days, please contact the clinic through Green Is Goodhart or phone. If you have a critical or abnormal lab result, we will notify you by phone as soon as possible.  Submit refill requests through MeeGenius or call your pharmacy and they will forward the refill request to us. Please allow 3 business days for your refill to be completed.          Additional Information About Your Visit        [employer] Is Goodhart Information     MeeGenius gives you secure access to your electronic health record. If you see a primary care provider, you can also send messages to your care team and make appointments. If you have questions, please call your primary care clinic.  If you do not have a primary care provider, please call [phone] and they will assist you.        Care EveryWhere ID     This is your Care EveryWhere ID. This could be used by other organizations to access your [place] medical records  [id number]        Your Vitals Were     Pulse Temperature Last Period Pulse Oximetry BMI (Body Mass Index)       85 97.6  F (36.4  C) (Oral) 05/10/2018 (Exact Date) 96% 22.48 kg/m2        Blood Pressure from Last 3 Encounters:   09/19/18 106/63   08/15/18 113/67   07/18/18 115/74    Weight from Last 3 Encounters:   09/19/18 135 lb 8 oz (61.5 kg)   08/15/18 133 lb (60.3 kg)   07/18/18 133 lb 6.4 oz (60.5 kg)              Today, you had the following     No orders found for display       Primary Care Provider Office Phone # Fax #    KEL Goldstein [phone]       [address]  [place] [address]        Equal Access to Services     KEARA DAVIES : Aishwarya Shepherd, waaxda luqadaha, qaybta kaalmada adejuve, yovani thomas. So [facility]  [phone].    ATENCIÓN: Si victorino schilling, tiene a lindquist disposición servicios gratuitos de asistencia lingüística. Pam sloan [phone].    We comply with applicable federal civil rights laws and [place] laws. We do not discriminate on the basis of race, color, national origin, age, disability, sex, sexual orientation, or gender identity.            Thank you!     Thank you for choosing [facility]  for your care. Our goal is always to provide you with excellent care. Hearing back from our patients is one way we can continue to improve our services. Please take a few minutes to complete the written survey that you may receive in the mail after your visit with us. Thank you!             Your Updated Medication List - Protect others around you: Learn how to safely use, store and throw away your medicines at www.disposemymeds.org.          This list is accurate as of 9/19/18 10:45 AM.  Always use your most recent med list.                   Brand Name Dispense Instructions for use Diagnosis    amitriptyline 10 MG tablet    ELAVIL     Take 2 tablets by mouth at bedtime as needed.        ondansetron 4 MG tablet    ZOFRAN     Take 1 Tab by mouth every 8 (eight) hours as needed for Nausea & Vomiting.        prenatal multivitamin plus iron 27-0.8 MG Tabs per tablet      Take 1 tablet by mouth daily    Supervision of other normal pregnancy, antepartum       ranitidine 150 MG tablet    ZANTAC    180 tablet    TAKE 1 TABLET(150 MG) BY MOUTH TWICE DAILY    Dyspepsia

## 2018-09-19 NOTE — PROGRESS NOTES
Patient presents for routine prenatal visit at 18w6d.  Patient without complaint, except having a recurrence of her back issues.  Discussed using a belly band and consider PT  PE: See OB vitals  Body mass index is 22.48 kg/(m^2).    Questions asked and answered.  U/S:  pending    Rafi Shaw MD FACOG

## 2018-10-17 ENCOUNTER — PRENATAL OFFICE VISIT (OUTPATIENT)
Dept: OBGYN | Facility: CLINIC | Age: 28
End: 2018-10-17
Payer: COMMERCIAL

## 2018-10-17 VITALS
HEART RATE: 100 BPM | TEMPERATURE: 97.6 F | DIASTOLIC BLOOD PRESSURE: 64 MMHG | WEIGHT: 143.8 LBS | BODY MASS INDEX: 23.86 KG/M2 | SYSTOLIC BLOOD PRESSURE: 109 MMHG | OXYGEN SATURATION: 97 %

## 2018-10-17 DIAGNOSIS — Z34.80 SUPERVISION OF OTHER NORMAL PREGNANCY, ANTEPARTUM: Primary | ICD-10-CM

## 2018-10-17 DIAGNOSIS — R10.13 DYSPEPSIA: ICD-10-CM

## 2018-10-17 PROCEDURE — 99207 ZZC PRENATAL VISIT: CPT | Performed by: OBSTETRICS & GYNECOLOGY

## 2018-10-17 NOTE — PATIENT INSTRUCTIONS
If you have any questions regarding your visit, Please contact your care team.    [facility]: [phone]      Women s Health CLINIC HOURS TELEPHONE NUMBER   MD Aleena Perkins CMA Lisa -    BRIDGETT Goff       Monday:       7:30-4:30 [place]  Wednesday:       7:30-4:30 [place]  Thursday:       7:30-1:30 [place]  Friday:       7:30-11:30 [facility]  [address] [place]  [address]  [phone] ask for Women's [facility]  [address] [place] [address]  [phone] ask for [facility]    Imaging Scheduling for [place]:  [phone]    Imaging Scheduling for [place]: [phone]       Urgent Care locations:    [facility] Saturday and Sunday   9 am - 5 pm    Monday-Friday   12 pm - 8 pm  Saturday and Sunday   9 am - 5 pm   [phone]     [facility] Labor and Delivery:  [phone]    If you need a medication refill, please contact your pharmacy. Please allow 3 business days for your refill to be completed.  As always, Thank you for trusting us with your healthcare needs!

## 2018-10-17 NOTE — MR AVS SNAPSHOT
After Visit Summary   10/17/2018    Xiomara Doherty    MRN: [id number]           Patient Information     Date Of Birth          1990        Visit Information        Provider Department      10/17/2018 9:30 AM Rafi Shaw MD [facility]        Today's Diagnoses     Supervision of other normal pregnancy, antepartum    -  1    Dyspepsia          Care Instructions                                                         If you have any questions regarding your visit, Please contact your care team.    [facility] Access Services: [phone]      Women s Health CLINIC HOURS TELEPHONE NUMBER   MD Aleena Perkins CMA Lisa -    BRIDGETT Goff       Monday:       7:30-4:30 [place]  Wednesday:       7:30-4:30 [place]  Thursday:       7:30-1:30 [place]  Friday:       7:30-11:30 [facility]  [address] [place]  [address]  [phone] ask for [facility]  [address] [place] [address]  [phone] ask for [facility]    Imaging Scheduling for [place]:  [phone]    Imaging Scheduling for [place]: [phone]       Urgent Care locations:    [facility] Saturday and Sunday   9 am - 5 pm    Monday-Friday   12 pm - 8 pm  Saturday and Sunday   9 am - 5 pm   [phone]     [facility] Labor and Delivery:  [phone]    If you need a medication refill, please contact your pharmacy. Please allow 3 business days for your refill to be completed.  As always, Thank you for trusting us with your healthcare needs!              Follow-ups after your visit        Follow-up notes from your care team     Return in about 4 weeks (around 11/14/2018) for Prenatal Visit.      Future tests that were ordered for you today     Open Future Orders        Priority Expected Expires Ordered    Glucose tolerance gest screen 1 hour Routine  10/17/2018 12/16/2018 10/17/2018    ABO and Rh Routine 10/17/2018 12/16/2018 10/17/2018    Hemoglobin Routine 10/17/2018 12/16/2018 10/17/2018            Who to contact     If you have questions or need follow up information about today's clinic visit or your schedule please contact [facility] directly at [phone].  Normal or non-critical lab and imaging results will be communicated to you by Jellycoasterhart, letter or phone within 4 business days after the clinic has received the results. If you do not hear from us within 7 days, please contact the clinic through Zeptort or phone. If you have a critical or abnormal lab result, we will notify you by phone as soon as possible.  Submit refill requests through Autopilot (formerly Bislr) or call your pharmacy and they will forward the refill request to us. Please allow 3 business days for your refill to be completed.          Additional Information About Your Visit        [employer]elly[employer]aster[employer] Information     Autopilot (formerly Bislr) gives you secure access to your electronic health record. If you see a primary care provider, you can also send messages to your care team and make appointments. If you have questions, please call your primary care clinic.  If you do not have a primary care provider, please call [phone] and they will assist you.        Care EveryWhere ID     This is your Care EveryWhere ID. This could be used by other organizations to access your [facility] medical records  [id number]        Your Vitals Were     Pulse Temperature Last Period Pulse Oximetry BMI (Body Mass Index)       100 97.6  F (36.4  C) (Oral) 05/10/2018 (Exact Date) 97% 23.86 kg/m2        Blood Pressure from Last 3 Encounters:   10/17/18 109/64   09/19/18 106/63   08/15/18 113/67    Weight from Last 3 Encounters:   10/17/18 143 lb 12.8 oz (65.2 kg)   09/19/18 135 lb 8 oz (61.5 kg)   08/15/18 133 lb (60.3 kg)                 Today's Medication Changes          These changes are accurate as of 10/17/18  9:48 AM.  If you  have any questions, ask your nurse or doctor.               These medicines have changed or have updated prescriptions.        Dose/Directions    ranitidine 150 MG tablet   Commonly known as:  ZANTAC   This may have changed:  See the new instructions.   Used for:  Dyspepsia   Changed by:  Rafi Shaw MD        Dose:  150 mg   Take 1 tablet (150 mg) by mouth 2 times daily   Quantity:  180 tablet   Refills:  1            Where to get your medicines      These medications were sent to [facility] - [place], [place] - [address]  [address], [facility] [address]     Phone:  [phone]     ranitidine 150 MG tablet                Primary Care Provider Office Phone # Fax #    Eneida Sánchez Stefan, APRN [facility] [phone]       [address]  [place] [address]        Equal Access to Services     [facility]: Hadii aad ku hadasho Soomaali, waaxda luqadaha, qaybta kaalmada adeegyada, yovani zacarias hayrosa garvin . So [facility] [phone].    ATENCIÓN: Si habla español, tiene a lindquist disposición servicios gratuitos de asistencia lingüística. Pam al [phone].    We comply with applicable federal civil rights laws and [place] laws. We do not discriminate on the basis of race, color, national origin, age, disability, sex, sexual orientation, or gender identity.            Thank you!     Thank you for choosing [facility]  for your [place]. Our goal is always to provide you with excellent care. Hearing back from our patients is one way we can continue to improve our services. Please take a few minutes to complete the written survey that you may receive in the mail after your visit with us. Thank you!             Your Updated Medication List - Protect others around you: Learn how to safely use, store and throw away your medicines at www.disposemymeds.org.          This list is accurate as of 10/17/18  9:48 AM.  Always use your most recent med list.                    Brand Name Dispense Instructions for use Diagnosis    amitriptyline 10 MG tablet    ELAVIL     Take 2 tablets by mouth at bedtime as needed.        ondansetron 4 MG tablet    ZOFRAN     Take 1 Tab by mouth every 8 (eight) hours as needed for Nausea & Vomiting.        prenatal multivitamin plus iron 27-0.8 MG Tabs per tablet      Take 1 tablet by mouth daily    Supervision of other normal pregnancy, antepartum       ranitidine 150 MG tablet    ZANTAC    180 tablet    Take 1 tablet (150 mg) by mouth 2 times daily    Dyspepsia

## 2018-10-17 NOTE — PROGRESS NOTES
Patient presents for routine prenatal visit at 22w6d.  Patient without complaint. Having worsening GERD.  Go back to her pre-pregnancy dosage 150mg BID  PE: See OB vitals  Body mass index is 23.86 kg/(m^2).    No nausea/vomiting. No heartburn.  No vaginal bleeding, no contractions/severe cramping, no leakage of fluid.  No vaginal discharge. No dysuria  No headache, vision changes, lower extremity swelling, upper abdominal pain, chest pain, shortness of breath.   Screening ultrasound done  Questions asked and answered.    1 hour gtt on RETURN TO CLINIC   Rafi Shaw MD FACOG

## 2018-11-14 ENCOUNTER — MYC MEDICAL ADVICE (OUTPATIENT)
Dept: OBGYN | Facility: CLINIC | Age: 28
End: 2018-11-14

## 2018-11-21 ENCOUNTER — PRENATAL OFFICE VISIT (OUTPATIENT)
Dept: OBGYN | Facility: CLINIC | Age: 28
End: 2018-11-21
Payer: COMMERCIAL

## 2018-11-21 VITALS
OXYGEN SATURATION: 100 % | BODY MASS INDEX: 24.92 KG/M2 | TEMPERATURE: 97.4 F | SYSTOLIC BLOOD PRESSURE: 120 MMHG | WEIGHT: 150.2 LBS | HEART RATE: 93 BPM | DIASTOLIC BLOOD PRESSURE: 78 MMHG

## 2018-11-21 DIAGNOSIS — Z34.80 SUPERVISION OF OTHER NORMAL PREGNANCY, ANTEPARTUM: ICD-10-CM

## 2018-11-21 DIAGNOSIS — Z34.80 SUPERVISION OF OTHER NORMAL PREGNANCY, ANTEPARTUM: Primary | ICD-10-CM

## 2018-11-21 DIAGNOSIS — Z29.13 NEED FOR RHOGAM DUE TO RH NEGATIVE MOTHER: ICD-10-CM

## 2018-11-21 LAB
ABO + RH BLD: NORMAL
ABO + RH BLD: NORMAL
GLUCOSE 1H P 50 G GLC PO SERPL-MCNC: 128 MG/DL (ref 60–129)
HGB BLD-MCNC: 10.8 G/DL (ref 11.7–15.7)
SPECIMEN EXP DATE BLD: NORMAL

## 2018-11-21 PROCEDURE — 86900 BLOOD TYPING SEROLOGIC ABO: CPT | Performed by: OBSTETRICS & GYNECOLOGY

## 2018-11-21 PROCEDURE — 99207 ZZC PRENATAL VISIT: CPT | Performed by: OBSTETRICS & GYNECOLOGY

## 2018-11-21 PROCEDURE — 85018 HEMOGLOBIN: CPT | Performed by: OBSTETRICS & GYNECOLOGY

## 2018-11-21 PROCEDURE — 36415 COLL VENOUS BLD VENIPUNCTURE: CPT | Performed by: OBSTETRICS & GYNECOLOGY

## 2018-11-21 PROCEDURE — 86901 BLOOD TYPING SEROLOGIC RH(D): CPT | Performed by: OBSTETRICS & GYNECOLOGY

## 2018-11-21 PROCEDURE — 82950 GLUCOSE TEST: CPT | Performed by: OBSTETRICS & GYNECOLOGY

## 2018-11-21 RX ORDER — NITROFURANTOIN 25; 75 MG/1; MG/1
CAPSULE ORAL
Refills: 0 | COMMUNITY
Start: 2018-11-15 | End: 2019-01-04

## 2018-11-21 NOTE — PATIENT INSTRUCTIONS
If you have any questions regarding your visit, Please contact your care team.    [facility] Services: [phone]      Women s Health CLINIC HOURS TELEPHONE NUMBER   MD Aleena Perkins CMA Lisa -    BRIDGETT Goff       Monday:       7:30-4:30 [place]  Wednesday:       7:30-4:30 [place]  Thursday:       7:30-1:30 [place]  Friday:       7:30-11:30 [facility]  [address] [facility] [place]  [address]  [phone] ask for [facility]  [address] [place] [address]  [phone] ask for [place] [facility]    Imaging Scheduling for [place]:  [phone]    Imaging Scheduling for [place]: [phone]       Urgent Care locations:    [facility] Saturday and Sunday   9 am - 5 pm    Monday-Friday   12 pm - 8 pm  Saturday and Sunday   9 am - 5 pm   [phone]     [facility] Labor and Delivery:  [phone]    If you need a medication refill, please contact your pharmacy. Please allow 3 business days for your refill to be completed.  As always, Thank you for trusting us with your healthcare needs!

## 2018-11-21 NOTE — PROGRESS NOTES
Patient presents for routine prenatal visit at 27w6d.  Patient with complaint. Feeling fatigue, but improving  PE: See OB vitals  Body mass index is 24.92 kg/(m^2).    Doing well.  No concerns today.  No vaginal bleeding, loss of fluid, or contractions  1 hour GTT done today.  Questions asked and answered.    1 hour gtt today  Rhogam next visit  Rafi Shaw MD FACOG

## 2018-12-05 ENCOUNTER — PRENATAL OFFICE VISIT (OUTPATIENT)
Dept: OBGYN | Facility: CLINIC | Age: 28
End: 2018-12-05
Payer: COMMERCIAL

## 2018-12-05 VITALS
SYSTOLIC BLOOD PRESSURE: 117 MMHG | OXYGEN SATURATION: 100 % | HEART RATE: 93 BPM | DIASTOLIC BLOOD PRESSURE: 72 MMHG | WEIGHT: 151.7 LBS | BODY MASS INDEX: 25.17 KG/M2 | TEMPERATURE: 97.5 F

## 2018-12-05 DIAGNOSIS — Z23 NEED FOR TDAP VACCINATION: ICD-10-CM

## 2018-12-05 DIAGNOSIS — Z29.13 NEED FOR RHOGAM DUE TO RH NEGATIVE MOTHER: Primary | ICD-10-CM

## 2018-12-05 DIAGNOSIS — Z34.80 SUPERVISION OF OTHER NORMAL PREGNANCY, ANTEPARTUM: ICD-10-CM

## 2018-12-05 PROCEDURE — 99207 ZZC PRENATAL VISIT: CPT | Performed by: OBSTETRICS & GYNECOLOGY

## 2018-12-05 PROCEDURE — 96372 THER/PROPH/DIAG INJ SC/IM: CPT | Performed by: OBSTETRICS & GYNECOLOGY

## 2018-12-05 PROCEDURE — 90471 IMMUNIZATION ADMIN: CPT | Performed by: OBSTETRICS & GYNECOLOGY

## 2018-12-05 PROCEDURE — 90715 TDAP VACCINE 7 YRS/> IM: CPT | Performed by: OBSTETRICS & GYNECOLOGY

## 2018-12-05 NOTE — MR AVS SNAPSHOT
After Visit Summary   12/5/2018    Xiomara Doherty    MRN: [id number]           Patient Information     Date Of Birth          1990        Visit Information        Provider Department      12/5/2018 10:00 AM Rafi Shaw MD [facility]        Today's Diagnoses     Need for rhogam due to Rh negative mother    -  1    Supervision of other normal pregnancy, antepartum        Need for Tdap vaccination          Care Instructions                                                         If you have any questions regarding your visit, Please contact your care team.    [facility] Access Services: [phone]      Women s Health CLINIC HOURS TELEPHONE NUMBER   MD Aleena Perkins CMA Lisa -    BRIDGETT Goff       Monday:       7:30-4:30 [place]  Wednesday:       7:30-4:30 [place]  Thursday:       7:30-1:30 [place]  Friday:       7:30-11:30 [facility]  [address] [facility] [place]  [address]  [phone] ask for Women's Clinic    [facility]  [address] [place] [address]  [phone] ask for [facility]    Imaging Scheduling for [place]:  [phone]    Imaging Scheduling for [place]: [phone]       Urgent Care locations:    [facility] Saturday and Sunday   9 am - 5 pm    Monday-Friday   12 pm - 8 pm  Saturday and Sunday   9 am - 5 pm   [phone]     [facility] Labor and Delivery:  [phone]    If you need a medication refill, please contact your pharmacy. Please allow 3 business days for your refill to be completed.  As always, Thank you for trusting us with your healthcare needs!              Follow-ups after your visit        Follow-up notes from your care team     Return in about 2 weeks (around 12/19/2018) for Prenatal Visit.      Your next 10 appointments already scheduled     Dec 19, 2018  9:00 AM CST   ESTABLISHED PRENATAL  with Rafi Shaw MD   [facility]    [address] [facility] [address] [phone]            Jan 02, 2019  9:00 AM CST   ESTABLISHED PRENATAL with Rafi Shaw MD   [facility]    [address] [facility] [address] [phone]              Who to contact     If you have questions or need follow up information about today's clinic visit or your schedule please contact [facility] directly at [phone].  Normal or non-critical lab and imaging results will be communicated to you by MyChart, letter or phone within 4 business days after the clinic has received the results. If you do not hear from us within 7 days, please contact the clinic through Streaming Erat or phone. If you have a critical or abnormal lab result, we will notify you by phone as soon as possible.  Submit refill requests through Geomagic or call your pharmacy and they will forward the refill request to us. Please allow 3 business days for your refill to be completed.          Additional Information About Your Visit        Geomagic Information     Geomagic gives you secure access to your electronic health record. If you see a primary care provider, you can also send messages to your care team and make appointments. If you have questions, please call your primary care clinic.  If you do not have a primary care provider, please call [phone] and they will assist you.        Care EveryWhere ID     This is your Care EveryWhere ID. This could be used by other organizations to access your [place] medical records  [id number]        Your Vitals Were     Pulse Temperature Last Period Pulse Oximetry BMI (Body Mass Index)       93 97.5  F (36.4  C) (Oral) 05/10/2018 (Exact Date) 100% 25.17 kg/m2        Blood Pressure from Last 3 Encounters:   12/05/18 117/72   11/21/18 120/78   10/17/18 109/64    Weight from  Last 3 Encounters:   12/05/18 151 lb 11.2 oz (68.8 kg)   11/21/18 150 lb 3.2 oz (68.1 kg)   10/17/18 143 lb 12.8 oz (65.2 kg)              We Performed the Following     ADMIN 1st VACCINE     TDAP VACCINE (ADACEL)          Today's Medication Changes          These changes are accurate as of 12/5/18 11:55 AM.  If you have any questions, ask your nurse or doctor.               Start taking these medicines.        Dose/Directions    rho(D) immune globulin 300 MCG injection   Commonly known as:  HYPERRHO/RHOGAM   Used for:  Need for rhogam due to Rh negative mother   Started by:  Rafi Shaw MD        Dose:  300 mcg   Inject 300 mcg into the muscle once for 1 dose   Quantity:  1 mcg   Refills:  0            Where to get your medicines      Some of these will need a paper prescription and others can be bought over the counter.  Ask your nurse if you have questions.     You don't need a prescription for these medications     rho(D) immune globulin 300 MCG injection                Primary Care Provider Office Phone # Fax #    Eneida Princess Aviles, APRN [facility] [phone]       [address]  [place] [address]        Equal Access to Services     MICAELA DAVIES AH: Hadii antony leon hadasho Somorrisali, waaxda luqadaha, qaybta kaalmada adeegyada, yovani thomas. So [facility] [place] [phone].    ATENCIÓN: Si habla español, tiene a lindquist disposición servicios gratuitos de asistencia lingüística. [facility] [phone].    We comply with applicable federal civil rights laws and [place] laws. We do not discriminate on the basis of race, color, national origin, age, disability, sex, sexual orientation, or gender identity.            Thank you!     Thank you for choosing [facility]  for your care. Our goal is always to provide you with excellent care. Hearing back from our patients is one way we can continue to improve our services. Please take a few minutes to complete the  written survey that you may receive in the mail after your visit with us. Thank you!             Your Updated Medication List - Protect others around you: Learn how to safely use, store and throw away your medicines at www.disposemymeds.org.          This list is accurate as of 12/5/18 11:55 AM.  Always use your most recent med list.                   Brand Name Dispense Instructions for use Diagnosis    amitriptyline 10 MG tablet    ELAVIL     Take 2 tablets by mouth at bedtime as needed.        nitroFURantoin macrocrystal-monohydrate 100 MG capsule    MACROBID     TAKE 1 CAPSULE (100 MG) BY MOUTH TWICE A DAY WITH BREAKFAST AND DINNER FOR 5 DAYS.        ondansetron 4 MG tablet    ZOFRAN     Take 1 Tab by mouth every 8 (eight) hours as needed for Nausea & Vomiting.        prenatal multivitamin w/iron 27-0.8 MG tablet      Take 1 tablet by mouth daily    Supervision of other normal pregnancy, antepartum       ranitidine 150 MG tablet    ZANTAC    180 tablet    Take 1 tablet (150 mg) by mouth 2 times daily    Dyspepsia       rho(D) immune globulin 300 MCG injection    HYPERRHO/RHOGAM    1 mcg    Inject 300 mcg into the muscle once for 1 dose    Need for rhogam due to Rh negative mother

## 2018-12-05 NOTE — NURSING NOTE
Screening Questionnaire for Adult Immunization    Are you sick today?   No   Do you have allergies to medications, food, a vaccine component or latex?   No   Have you ever had a serious reaction after receiving a vaccination?   No   Do you have a long-term health problem with heart disease, lung disease, asthma, kidney disease, metabolic disease (e.g. diabetes), anemia, or other blood disorder?   No   Do you have cancer, leukemia, HIV/AIDS, or any other immune system problem?   No   In the past 3 months, have you taken medications that affect  your immune system, such as prednisone, other steroids, or anticancer drugs; drugs for the treatment of rheumatoid arthritis, Crohn s disease, or psoriasis; or have you had radiation treatments?   No   Have you had a seizure, or a brain or other nervous system problem?   No   During the past year, have you received a transfusion of blood or blood     products, or been given immune (gamma) globulin or antiviral drug?   No   For women: Are you pregnant or is there a chance you could become        pregnant during the next month?   Yes   Have you received any vaccinations in the past 4 weeks?   No     Immunization questionnaire was positive for at least one answer.  Notified .        Per orders of Dr. Shaw, injection of TDAP given by Aleena Steward. Patient instructed to remain in clinic for 15 minutes afterwards, and to report any adverse reaction to me immediately.       Screening performed by Aleena Steward on 12/5/2018 at 10:00 AM.

## 2018-12-05 NOTE — PATIENT INSTRUCTIONS
If you have any questions regarding your visit, Please contact your care team.    [facility] Access Services: [phone]      Women s Health CLINIC HOURS TELEPHONE NUMBER   MD Aleena Perkins CMA Lisa -    BRIDGETT Goff       Monday:       7:30-4:30 [place]  Wednesday:       7:30-4:30 [place]  Thursday:       7:30-1:30 [place]  Friday:       7:30-11:30 [facility]  [address] [place]  [address]  [phone] ask for Women's [facility]  [address] [place] [address]  [phone] ask for [facility]    Imaging Scheduling for [place]:  [phone]    Imaging Scheduling for [place]: [phone]       Urgent Care locations:    [facility] Saturday and Sunday   9 am - 5 pm    Monday-Friday   12 pm - 8 pm  Saturday and Sunday   9 am - 5 pm   [phone]     [facility] Labor and Delivery:  [phone]    If you need a medication refill, please contact your pharmacy. Please allow 3 business days for your refill to be completed.  As always, Thank you for trusting us with your healthcare needs!

## 2018-12-05 NOTE — PROGRESS NOTES
Patient presents for routine prenatal visit at 29w6d.  Patient without complaint.   PE: See OB vitals  There is no height or weight on file to calculate BMI.    Doing well.  No concerns today.  No vaginal bleeding, loss of fluid, or contractions  Rhogam given today.  Tdap given today  Questions asked and answered.      Rafi Shaw MD FACOG

## 2018-12-05 NOTE — PROGRESS NOTES
TDAP Vaccine (Adacel)      Date Status Dose VIS Date Route Site  Lot# Given By Verified By     2018 Given 0.5 mL 2015, Given Today IM RD [employer] [id number] Aleena Steward MA --         Exp. Date NDC # Product Time Location External Comment     10/10/2020 65634-358-31  --  --      Updated by: Aleena Steward MA on 2018 10:05 AM      The following medication was given:     MEDICATION: RhoGam 300 ug  ROUTE: IM  SITE: [facility]  DOSE: 300ug  LOT #: [id number]  :  [employer]  EXPIRATION DATE:  2020  NDC#: 9454-3105-96  Aleena Steward CMA

## 2018-12-19 ENCOUNTER — PRENATAL OFFICE VISIT (OUTPATIENT)
Dept: OBGYN | Facility: CLINIC | Age: 28
End: 2018-12-19
Payer: COMMERCIAL

## 2018-12-19 VITALS
DIASTOLIC BLOOD PRESSURE: 73 MMHG | WEIGHT: 155.3 LBS | SYSTOLIC BLOOD PRESSURE: 120 MMHG | HEART RATE: 100 BPM | OXYGEN SATURATION: 97 % | BODY MASS INDEX: 25.76 KG/M2 | TEMPERATURE: 97.5 F

## 2018-12-19 DIAGNOSIS — G25.81 RESTLESS LEGS SYNDROME (RLS): Primary | ICD-10-CM

## 2018-12-19 DIAGNOSIS — Z34.80 SUPERVISION OF OTHER NORMAL PREGNANCY, ANTEPARTUM: ICD-10-CM

## 2018-12-19 PROCEDURE — 99207 ZZC PRENATAL VISIT: CPT | Performed by: OBSTETRICS & GYNECOLOGY

## 2018-12-19 NOTE — PATIENT INSTRUCTIONS
If you have any questions regarding your visit, Please contact your care team.    [facility] Access Services: [phone]      Women s Health CLINIC HOURS TELEPHONE NUMBER   MD Aleena Perkins CMA Lisa -    BRIDGETT Goff       Monday:       7:30-4:30 [place]  Wednesday:       7:30-4:30 [place]  Thursday:       7:30-1:30 [place]  Friday:       7:30-11:30 [facility]  [address] [facility] [place]  [address]  [phone] ask for [facility]  [address] [place] [address]  [phone] ask for [facility]    Imaging Scheduling for [place]:  [phone]    Imaging Scheduling for [place]: [phone]       Urgent Care locations:    [facility] Saturday and Sunday   9 am - 5 pm    Monday-Friday   12 pm - 8 pm  Saturday and Sunday   9 am - 5 pm   [phone]     [facility] Labor and Delivery:  [phone]    If you need a medication refill, please contact your pharmacy. Please allow 3 business days for your refill to be completed.  As always, Thank you for trusting us with your healthcare needs!

## 2018-12-19 NOTE — PROGRESS NOTES
Patient presents for routine prenatal visit at 31w6d.  Patient with complaint. The restless legs are worse and she isn't sleeping.  Will refer to Neurology  PE: See OB vitals  Body mass index is 25.76 kg/m .    No vaginal bleeding, loss of fluid, or contractions  Questions asked and answered.      Rafi Shaw MD FACOG

## 2018-12-27 ENCOUNTER — PRENATAL OFFICE VISIT (OUTPATIENT)
Dept: OBGYN | Facility: CLINIC | Age: 28
End: 2018-12-27
Payer: COMMERCIAL

## 2018-12-27 ENCOUNTER — MYC MEDICAL ADVICE (OUTPATIENT)
Dept: OBGYN | Facility: CLINIC | Age: 28
End: 2018-12-27

## 2018-12-27 VITALS
HEART RATE: 102 BPM | DIASTOLIC BLOOD PRESSURE: 80 MMHG | WEIGHT: 156.8 LBS | BODY MASS INDEX: 26.01 KG/M2 | SYSTOLIC BLOOD PRESSURE: 138 MMHG | TEMPERATURE: 97.3 F | OXYGEN SATURATION: 96 %

## 2018-12-27 DIAGNOSIS — Z34.80 SUPERVISION OF OTHER NORMAL PREGNANCY, ANTEPARTUM: ICD-10-CM

## 2018-12-27 DIAGNOSIS — L29.9 ITCHING: Primary | ICD-10-CM

## 2018-12-27 PROCEDURE — 99213 OFFICE O/P EST LOW 20 MIN: CPT | Performed by: OBSTETRICS & GYNECOLOGY

## 2018-12-27 PROCEDURE — 83789 MASS SPECTROMETRY QUAL/QUAN: CPT | Mod: 90 | Performed by: OBSTETRICS & GYNECOLOGY

## 2018-12-27 PROCEDURE — 99000 SPECIMEN HANDLING OFFICE-LAB: CPT | Performed by: OBSTETRICS & GYNECOLOGY

## 2018-12-27 PROCEDURE — 99207 ZZC PRENATAL VISIT: CPT | Performed by: OBSTETRICS & GYNECOLOGY

## 2018-12-27 PROCEDURE — 36415 COLL VENOUS BLD VENIPUNCTURE: CPT | Performed by: OBSTETRICS & GYNECOLOGY

## 2018-12-27 RX ORDER — HYDROXYZINE PAMOATE 50 MG/1
50 CAPSULE ORAL 3 TIMES DAILY PRN
Qty: 30 CAPSULE | Refills: 1 | Status: SHIPPED | OUTPATIENT
Start: 2018-12-27 | End: 2019-03-20

## 2018-12-27 NOTE — PATIENT INSTRUCTIONS
If you have any questions regarding your visit, Please contact your care team.    [facility] Access Services: [phone]      Women s Health CLINIC HOURS TELEPHONE NUMBER   MD Aleena Perkins CMA Lisa -    BRIDGETT Goff       Monday:       7:30-4:30 [place]  Wednesday:       7:30-4:30 [place]  Thursday:       7:30-1:30 [place]  Friday:       7:30-11:30 [facility]  [address] [facility] [place]  [address]  [phone] ask for Women's [facility]  [address] [place] [address]  [phone] ask for [facility]    Imaging Scheduling for [place]:  [phone]    Imaging Scheduling for [place]: [phone]       Urgent Care locations:    [facility] Saturday and Sunday   9 am - 5 pm    Monday-Friday   12 pm - 8 pm  Saturday and Sunday   9 am - 5 pm   [phone]     [facility] Labor and Delivery:  [phone]    If you need a medication refill, please contact your pharmacy. Please allow 3 business days for your refill to be completed.  As always, Thank you for trusting us with your healthcare needs!

## 2018-12-27 NOTE — TELEPHONE ENCOUNTER
Patient called back and I helped to put her on the schedule at [place] today at 11:45. Address given.  She has terrible itching of hands and arms. Has tried everything.  RUTH Corbin 12/27/2018

## 2018-12-27 NOTE — PROGRESS NOTES
Xiomara presents today with concerns.  She reports itching, but no rash.    ROS: 10 point review of systems is negative except for above.  Past Medical, surgical and Obstetric history is recorded in EPIC and reviewed.    /80   Pulse 102   Temp 97.3  F (36.3  C) (Oral)   Wt 71.1 kg (156 lb 12.8 oz)   LMP 05/10/2018 (Exact Date)   SpO2 96%   BMI 26.01 kg/m      PE:No rash, visible    OB vitals noted.      A/P: (L29.9) Itching  (primary encounter diagnosis)  Comment: vistaril and bile acids      (Z34.80) Supervision of other normal pregnancy, antepartum  Comment:   Plan: Routine follow up

## 2018-12-28 ENCOUNTER — MYC MEDICAL ADVICE (OUTPATIENT)
Dept: OBGYN | Facility: CLINIC | Age: 28
End: 2018-12-28

## 2018-12-30 LAB
BILE AC SERPL-SCNC: 1.2 UMOL/L (ref 0–2.5)
CDCAE SERPL-SCNC: 0.4 UMOL/L (ref 0–3.4)
CHOLATE SERPL-SCNC: 2.3 UMOL/L (ref 0–7)
DO-CHOLATE SERPL-SCNC: 0.4 UMOL/L (ref 0–1.9)
URSODEOXYCHOLATE SERPL-SCNC: 0.3 UMOL/L (ref 0–1)

## 2018-12-31 ENCOUNTER — MYC MEDICAL ADVICE (OUTPATIENT)
Dept: PEDIATRICS | Facility: CLINIC | Age: 28
End: 2018-12-31

## 2018-12-31 NOTE — TELEPHONE ENCOUNTER
Routing [employer] message to Natalia Aviles NP  to please review when able.      FYI patient is currently pregnant.    Kanika Glez RN, [facility]

## 2018-12-31 NOTE — TELEPHONE ENCOUNTER
PRAFUL to Natalia Aviles NP.    See that the RN in OB responded to patient and advised ED.  Patient read the message in MyChart.    Kanika Glez RN, [facility]

## 2018-12-31 NOTE — TELEPHONE ENCOUNTER
Called patient, left message with phone number to call back.       The patient sent the same message today to Dr. Shaw, OB/GYN.    Kanika Glez RN, [facility]

## 2018-12-31 NOTE — TELEPHONE ENCOUNTER
Please call her as I think she may need to go into the ER if her symptoms are significant and she is pregnant

## 2019-01-04 ENCOUNTER — OFFICE VISIT (OUTPATIENT)
Dept: PEDIATRICS | Facility: CLINIC | Age: 29
End: 2019-01-04
Payer: COMMERCIAL

## 2019-01-04 VITALS
HEART RATE: 110 BPM | DIASTOLIC BLOOD PRESSURE: 60 MMHG | OXYGEN SATURATION: 95 % | SYSTOLIC BLOOD PRESSURE: 120 MMHG | WEIGHT: 159.1 LBS | BODY MASS INDEX: 26.39 KG/M2 | TEMPERATURE: 98.3 F

## 2019-01-04 DIAGNOSIS — J20.9 BRONCHITIS WITH BRONCHOSPASM: Primary | ICD-10-CM

## 2019-01-04 PROCEDURE — 99213 OFFICE O/P EST LOW 20 MIN: CPT | Performed by: NURSE PRACTITIONER

## 2019-01-04 RX ORDER — AZITHROMYCIN 250 MG/1
TABLET, FILM COATED ORAL
Qty: 6 TABLET | Refills: 0 | Status: SHIPPED | OUTPATIENT
Start: 2019-01-04 | End: 2019-01-09

## 2019-01-04 RX ORDER — ALBUTEROL SULFATE 90 UG/1
2 AEROSOL, METERED RESPIRATORY (INHALATION) EVERY 4 HOURS PRN
Qty: 1 INHALER | Refills: 1 | Status: SHIPPED | OUTPATIENT
Start: 2019-01-04 | End: 2019-01-24

## 2019-01-04 NOTE — NURSING NOTE
"Chief Complaint   Patient presents with     Cough     cough x 2 months       Initial /60 (BP Location: Right arm, Patient Position: Sitting, Cuff Size: Adult Regular)   Pulse 110   Temp 98.3  F (36.8  C) (Temporal)   Wt 72.2 kg (159 lb 1.6 oz)   LMP 05/10/2018 (Exact Date)   SpO2 95%   BMI 26.39 kg/m   Estimated body mass index is 26.39 kg/m  as calculated from the following:    Height as of 7/18/18: 1.654 m (5' 5.1\").    Weight as of this encounter: 72.2 kg (159 lb 1.6 oz).  Medication Reconciliation: complete      HARVEY Stewart      "

## 2019-01-04 NOTE — Clinical Note
Hi Dr Shaw I am going to try a course of antibiotic and inhaler and she is describing bronchospasm and hope this helps. Just so you know and make sure not Marybeth Fisher

## 2019-01-04 NOTE — PATIENT INSTRUCTIONS
PLAN:   1.   Symptomatic therapy suggested: rest, increase fluids and call prn if symptoms persist or worsen.  2.  Orders Placed This Encounter   Medications     azithromycin (ZITHROMAX) 250 MG tablet     Sig: Take 2 tablets the first day and then take one a day for 4 days.     Dispense:  6 tablet     Refill:  0     albuterol (PROAIR HFA/PROVENTIL HFA/VENTOLIN HFA) 108 (90 Base) MCG/ACT inhaler     Sig: Inhale 2 puffs into the lungs every 4 hours as needed for shortness of breath / dyspnea or wheezing     Dispense:  1 Inhaler     Refill:  1     Spacer/Aero-Holding Chambers (SPACER/AERO-HOLD CHAMBER MASK) CASE     Si Device as needed     Dispense:  1 each     Refill:  0     3. Patient needs to follow up in if no improvement,or sooner if worsening of symptoms or other symptoms develop.        Patient Education     Using an Inhaler with a Spacer  To control asthma, you need to use your medicines the right way. Some medicines are inhaled using a device called a metered-dose inhaler (MDI). Metered-dose inhalers deliver medicine with a fine spray. You may be asked to use a spacer (holding tube) with your inhaler. The spacer helps make sure all the medicine you need goes into your lungs.   Steps for using an inhaler with a spacer  Step 1:    Remove the caps from the inhaler and spacer.    Shake the inhaler well and attach the spacer. If the inhaler is being used for the first time or has not been used for a while, prime it as directed by the product maker.  Step 2:    Breathe out normally.    Put the spacer between your teeth. Close your lips tightly around it.    Keep your chin up.  Step 3:    Spray 1 puff into the spacer by pressing down on the inhaler.    Then breathe in through your mouth as slowly and deeply as you can. This should take about 5-10 seconds. If you breathe too quickly, you may hear a whistling sound in certain spacers.  Step 4:    Take the spacer out of your mouth.    Hold your breath for a count of  10.    Then hold your lips together and slowly breathe out through your mouth.          If you re prescribed more than 1 puff of medicine at a time, wait at least 30 seconds between puffs. This number may be different for different medicines. Shake the inhaler again. Then repeat steps 2 to 4.   Date Last Reviewed: 10/1/2016    0151-7198 [employer]. [address] [place] [address], [place] [address]. All rights reserved. This information is not intended as a substitute for professional medical care. Always follow your healthcare professional's instructions.

## 2019-01-04 NOTE — PROGRESS NOTES
SUBJECTIVE:   Xiomara Doherty is a 28 year old female who presents to clinic today for the following health issues:    Acute Illness   Acute illness concerns: cough  Onset: 2 weeks    Fever: YES    Chills/Sweats: YES    Headache (location?): YES    Sinus Pressure:no    Conjunctivitis:  no    Ear Pain: no    Rhinorrhea: no    Congestion: no    Sore Throat: YES- started yesterday, worst on the right side     Cough: YES-non-productive, with shortness of breath, barking    Wheeze: YES    Decreased Appetite: YES    Nausea: YES    Vomiting: YES- due to cough    Diarrhea:  no     Dysuria/Freq.: YES    Fatigue/Achiness: YES    Sick/Strep Exposure: no     Therapies Tried and outcome: Cough drops, honey, vicks, salt water gargle, humidifier,  Hot packs  Is 35 weeks pregnant   Has been coughing for 2 months   Went to Urgent care on Sunday and they saw her but did not give her anything  2 weeks ago was given some cough medicine but could not get   No one else in the house is sick but cough will not go away   No sore throat  Except for yesterday   Having a lot of coughing spasms but no history of asthma  Has GERD already and is taking Ranitidine for this and has been taking the 2 tablets twice a day   Never has been a smoker   Problem list and histories reviewed & adjusted, as indicated.  Additional history: as documented    Patient Active Problem List   Diagnosis     Menorrhagia     Back pain     Urinary frequency     Adjustment disorder with anxiety     Generalized anxiety disorder     Psoriasis     Esophageal reflux     New daily persistent headache     Post-concussion headache     Supervision of other normal pregnancy, antepartum     Need for rhogam due to Rh negative mother     Past Surgical History:   Procedure Laterality Date     ARTHROSCOPY KNEE  7/26/2011    Procedure:ARTHROSCOPY KNEE; diagnostic; Surgeon:VIOLETTA JIM; Location:[facility] OR     ARTHROSCOPY KNEE RT/LT  10/20/09    right knee     ARTHROSCOPY KNEE WITH  RETINACULAR RELEASE  7/26/2011    Procedure:ARTHROSCOPY KNEE WITH RETINACULAR RELEASE; lateral release; Surgeon:VIOLETTA JIM; Location:[facility] OR     BIOPSY       COLONOSCOPY  06/21/12    [facility]     COLONOSCOPY WITH CO2 INSUFFLATION N/A 10/29/2014    Procedure: COLONOSCOPY WITH CO2 INSUFFLATION;  Surgeon: Duane, William Charles, MD;  Location: [facility] OR     ESOPHAGOSCOPY, GASTROSCOPY, DUODENOSCOPY (EGD), COMBINED  2/3/2011    COMBINED ESOPHAGOSCOPY, GASTROSCOPY, DUODENOSCOPY (EGD), BIOPSY SINGLE OR MULTIPLE performed by DINAH VALDEZ at  GI     ESOPHAGOSCOPY, GASTROSCOPY, DUODENOSCOPY (EGD), COMBINED N/A 10/29/2014    Procedure: COMBINED ESOPHAGOSCOPY, GASTROSCOPY, DUODENOSCOPY (EGD), BIOPSY SINGLE OR MULTIPLE;  Surgeon: Duane, William Charles, MD;  Location: [facility] OR     HC REMOVE TONSILS/ADENOIDS,<13 Y/O  1997     HC UGI ENDOSCOPY, SIMPLE EXAM  06/21/12    [facility]     IRRIGATION AND DEBRIDEMENT KNEE, COMBINED  7/26/2011    Procedure:COMBINED IRRIGATION AND DEBRIDEMENT KNEE; dedridement; Surgeon:VIOLETTA JIM; Location:[facility] OR     SOFT TISSUE SURGERY         Social History     Tobacco Use     Smoking status: Never Smoker     Smokeless tobacco: Never Used   Substance Use Topics     Alcohol use: Yes     Comment: Maybe once a week--none with pregnancy     Family History   Problem Relation Age of Onset     Cancer Father         skin cancer on face     Hypertension Father      Hypertension Paternal Grandfather      Prostate Cancer Maternal Grandfather      Asthma Sister         sports         Current Outpatient Medications   Medication Sig Dispense Refill     hydrOXYzine (VISTARIL) 50 MG capsule Take 1 capsule (50 mg) by mouth 3 times daily as needed for itching 30 capsule 1     ranitidine (ZANTAC) 150 MG tablet Take 1 tablet (150 mg) by mouth 2 times daily 180 tablet 1     amitriptyline (ELAVIL) 10 MG tablet Take 2 tablets by mouth at bedtime as needed.       Allergies   Allergen Reactions     Nuts Shortness Of  Breath     Pineapple Shortness Of Breath and Anaphylaxis     Pt. Allergic to pineapple     Fentanyl Itching     Vicodin [Hydrocodone-Acetaminophen] Nausea and Vomiting     Labs reviewed in EPIC    Reviewed and updated as needed this visit by clinical staff  Allergies       Reviewed and updated as needed this visit by Provider         ROS:  CONSTITUTIONAL:NEGATIVE for fever, chills, change in weight  ENT/MOUTH: NEGATIVE for ear, mouth and throat problems  RESP:POSITIVE for cough-non productive and NEGATIVE for SOB/dyspnea and wheezing  CV: NEGATIVE for chest pain, palpitations or peripheral edema  GI: NEGATIVE for nausea and vomiting  MUSCULOSKELETAL: NEGATIVE for significant arthralgias or myalgia  HEME/ALLERGY/IMMUNE: NEGATIVE for allergies    OBJECTIVE:     /60 (BP Location: Right arm, Patient Position: Sitting, Cuff Size: Adult Regular)   Pulse 110   Temp 98.3  F (36.8  C) (Temporal)   Wt 72.2 kg (159 lb 1.6 oz)   LMP 05/10/2018 (Exact Date)   SpO2 95%   BMI 26.39 kg/m    Body mass index is 26.39 kg/m .  GENERAL: Patient is well nourished, well developed,in no apparent distress, non-toxic, in no respiratory distress and acyanotic, alert, cooperative and well hydrated  mildly ill but alert and responsive  EYES:  Right conjunctiva is not injected and without discharge.  Left conjunctiva is not injected and without discharge.  EARS: negative findings: external ears normal to inspection and palpation, TM's clear, no mastoid process tenderness  NOSE: negative findings: nasal mucosa normal, no sinus tenderness,  Sinus not tender.  THROAT: normal.  NECK: supple with no adenopathy,   CARDIAC:NORMAL - regular rate and rhythm without murmur.  RESP: normal respiratory rate and rhythm, lungs clear to auscultation  unlabored respirations, no intercostal retractions or accessory muscle use  ABD: Abdomen soft, non-tender.  SKIN: Skin color, texture, turgor normal. No rashes or lesions.  MS: extremities normal- no  gross deformities noted, gait normal and normal muscle tone    Diagnostic Test Results:  none     ASSESSMENT/PLAN:     Xiomara was seen today for cough.    Diagnoses and all orders for this visit:    Bronchitis with bronchospasm  -    azithromycin (ZITHROMAX) 250 MG tablet; Take 2 tablets the first day and then take one a day for 4 days.  -     albuterol (PROAIR HFA/PROVENTIL HFA/VENTOLIN HFA) 108 (90 Base) MCG/ACT inhaler; Inhale 2 puffs into the lungs every 4 hours as needed for shortness of breath / dyspnea or wheezing (Patient not taking: Reported on 2019)  -     Spacer/Aero-Holding Chambers (SPACER/AERO-HOLD CHAMBER MASK) CASE; 1 Device as needed (Patient not taking: Reported on 2019)    PLAN:   1.   Symptomatic therapy suggested: rest, increase fluids and call prn if symptoms persist or worsen.  2.  Orders Placed This Encounter   Medications     azithromycin (ZITHROMAX) 250 MG tablet     Sig: Take 2 tablets the first day and then take one a day for 4 days.     Dispense:  6 tablet     Refill:  0     albuterol (PROAIR HFA/PROVENTIL HFA/VENTOLIN HFA) 108 (90 Base) MCG/ACT inhaler     Sig: Inhale 2 puffs into the lungs every 4 hours as needed for shortness of breath / dyspnea or wheezing     Dispense:  1 Inhaler     Refill:  1     Spacer/Aero-Holding Chambers (SPACER/AERO-HOLD CHAMBER MASK) CASE     Si Device as needed     Dispense:  1 each     Refill:  0     3. Patient needs to follow up in if no improvement,or sooner if worsening of symptoms or other symptoms develop.      See Patient Instructions    KEL Goldstein CNP  [facility]

## 2019-01-09 ENCOUNTER — PRENATAL OFFICE VISIT (OUTPATIENT)
Dept: OBGYN | Facility: CLINIC | Age: 29
End: 2019-01-09
Payer: COMMERCIAL

## 2019-01-09 VITALS
SYSTOLIC BLOOD PRESSURE: 122 MMHG | HEART RATE: 114 BPM | BODY MASS INDEX: 26.26 KG/M2 | DIASTOLIC BLOOD PRESSURE: 80 MMHG | OXYGEN SATURATION: 97 % | TEMPERATURE: 97.9 F | WEIGHT: 158.3 LBS

## 2019-01-09 DIAGNOSIS — Z34.80 SUPERVISION OF OTHER NORMAL PREGNANCY, ANTEPARTUM: Primary | ICD-10-CM

## 2019-01-09 PROCEDURE — 99207 ZZC PRENATAL VISIT: CPT | Performed by: OBSTETRICS & GYNECOLOGY

## 2019-01-09 NOTE — PATIENT INSTRUCTIONS
If you have any questions regarding your visit, Please contact your care team.    [facility]: [phone]      Women s Health CLINIC HOURS TELEPHONE NUMBER   MD Aleena Perkins CMA Lisa -    BRIDGETT Goff       Monday:       7:30-4:30 [place]  Wednesday:       7:30-4:30 [place]  Thursday:       7:30-1:30 [place]  Friday:       7:30-11:30 [facility]  [address] [facility] [place]  [address]  [phone] ask for Women[facility]  [address] [place] [address]  [phone] ask for [facility]    Imaging Scheduling for [place]:  [phone]    Imaging Scheduling for [place]: [phone]       Urgent Care locations:    [facility] Saturday and Sunday   9 am - 5 pm    Monday-Friday   12 pm - 8 pm  Saturday and Sunday   9 am - 5 pm   [phone]     [facility] Labor and Delivery:  [phone]    If you need a medication refill, please contact your pharmacy. Please allow 3 business days for your refill to be completed.  As always, Thank you for trusting us with your healthcare needs!

## 2019-01-09 NOTE — PROGRESS NOTES
Patient presents for routine prenatal visit at 34w6d.  Patient with complaint. Having lower ext muscle spasm and sciatic pain  PE: See OB vitals  There is no height or weight on file to calculate BMI.    No vaginal bleeding, loss of fluid, or contractions  Discussed kick counts and fetal movement.  Questions asked and answered.      Rafi Shaw MD FACOG

## 2019-01-10 DIAGNOSIS — R10.13 DYSPEPSIA: ICD-10-CM

## 2019-01-10 NOTE — TELEPHONE ENCOUNTER
Pending Prescriptions:                       Disp   Refills    ranitidine (ZANTAC) 150 MG tablet         180 ta*1            Sig: Take 1 tablet (150 mg) by mouth 2 times daily

## 2019-01-13 ENCOUNTER — MYC MEDICAL ADVICE (OUTPATIENT)
Dept: OBGYN | Facility: CLINIC | Age: 29
End: 2019-01-13

## 2019-01-14 NOTE — TELEPHONE ENCOUNTER
Refill not appropriate.  Rx sent to the requesting pharmacy on 10/17/18 for a 3 month supply with an additional 1 refills.    Keila Menchaca RN

## 2019-01-15 ENCOUNTER — MYC MEDICAL ADVICE (OUTPATIENT)
Dept: OBGYN | Facility: CLINIC | Age: 29
End: 2019-01-15

## 2019-01-16 ENCOUNTER — PRENATAL OFFICE VISIT (OUTPATIENT)
Dept: OBGYN | Facility: CLINIC | Age: 29
End: 2019-01-16
Payer: COMMERCIAL

## 2019-01-16 VITALS
SYSTOLIC BLOOD PRESSURE: 115 MMHG | TEMPERATURE: 97.6 F | WEIGHT: 156.9 LBS | OXYGEN SATURATION: 97 % | HEART RATE: 112 BPM | BODY MASS INDEX: 26.03 KG/M2 | DIASTOLIC BLOOD PRESSURE: 76 MMHG

## 2019-01-16 DIAGNOSIS — Z34.80 SUPERVISION OF OTHER NORMAL PREGNANCY, ANTEPARTUM: Primary | ICD-10-CM

## 2019-01-16 PROCEDURE — 87186 SC STD MICRODIL/AGAR DIL: CPT | Performed by: OBSTETRICS & GYNECOLOGY

## 2019-01-16 PROCEDURE — 87653 STREP B DNA AMP PROBE: CPT | Performed by: OBSTETRICS & GYNECOLOGY

## 2019-01-16 PROCEDURE — 99207 ZZC PRENATAL VISIT: CPT | Performed by: OBSTETRICS & GYNECOLOGY

## 2019-01-16 NOTE — PATIENT INSTRUCTIONS
If you have any questions regarding your visit, Please contact your care team.    [facility] Access Services: [phone]      Women s Health CLINIC HOURS TELEPHONE NUMBER   MD Aleena Perkins CMA Lisa -    BRIDGETT Goff       Monday:       7:30-4:30 [place]  Wednesday:       7:30-4:30 [place]  Thursday:       7:30-1:30 [place]  Friday:       7:30-11:30 [facility]  [address] [place]  [address]  [phone] ask for Women's [facility]  [address] [place] [address]  [phone] ask for [facility]    Imaging Scheduling for [place]:  [phone]    Imaging Scheduling for [place]: [phone]       Urgent Care locations:    [facility] Saturday and Sunday   9 am - 5 pm    Monday-Friday   12 pm - 8 pm  Saturday and Sunday   9 am - 5 pm   [phone]     [facility] Labor and Delivery:  [phone]    If you need a medication refill, please contact your pharmacy. Please allow 3 business days for your refill to be completed.  As always, Thank you for trusting us with your healthcare needs!

## 2019-01-16 NOTE — PROGRESS NOTES
Patient presents for routine prenatal visit at 35w6d.  Patient without complaint.   PE: See OB vitals  Body mass index is 26.03 kg/m .  Doing well.  No concerns today.  No vaginal bleeding, LOF, contractions.  No HA, RUQ pain, N/V, visual changes.  Cervix is posterior, long, closed and firm and 2/50/-2.    Group B Strep was done  Transabdominal ultrasound was performed to determine presentation.  A viable intrauterine pregnancy was seen.  The fetus is noted in VERTEX .  Fetal heart motion was visualized at 146 bpm.    Normal Amniotic Fluid Volume is present.  Questions asked and answered.    Rafi Shaw MD FACOG

## 2019-01-17 LAB
GP B STREP DNA SPEC QL NAA+PROBE: POSITIVE
SPECIMEN SOURCE: ABNORMAL

## 2019-01-18 DIAGNOSIS — R10.13 DYSPEPSIA: ICD-10-CM

## 2019-01-21 ENCOUNTER — MYC MEDICAL ADVICE (OUTPATIENT)
Dept: OBGYN | Facility: CLINIC | Age: 29
End: 2019-01-21

## 2019-01-21 ENCOUNTER — PRENATAL OFFICE VISIT (OUTPATIENT)
Dept: OBGYN | Facility: CLINIC | Age: 29
End: 2019-01-21
Payer: COMMERCIAL

## 2019-01-21 VITALS
WEIGHT: 157.4 LBS | HEART RATE: 95 BPM | OXYGEN SATURATION: 97 % | BODY MASS INDEX: 26.11 KG/M2 | DIASTOLIC BLOOD PRESSURE: 78 MMHG | TEMPERATURE: 96.6 F | SYSTOLIC BLOOD PRESSURE: 126 MMHG

## 2019-01-21 DIAGNOSIS — Z34.80 SUPERVISION OF OTHER NORMAL PREGNANCY, ANTEPARTUM: Primary | ICD-10-CM

## 2019-01-21 DIAGNOSIS — Z29.13 NEED FOR RHOGAM DUE TO RH NEGATIVE MOTHER: ICD-10-CM

## 2019-01-21 LAB
BACTERIA SPEC CULT: ABNORMAL
SPECIMEN SOURCE: ABNORMAL

## 2019-01-21 PROCEDURE — 99207 ZZC PRENATAL VISIT: CPT | Performed by: OBSTETRICS & GYNECOLOGY

## 2019-01-21 NOTE — PATIENT INSTRUCTIONS
If you have any questions regarding your visit, Please contact your care team.    [facility]: [phone]      Women s Health CLINIC HOURS TELEPHONE NUMBER   MD Aleena Perkins CMA Lisa -    BRIDGETT Goff       Monday:       7:30-4:30 [place]  Wednesday:       7:30-4:30 [place]  Thursday:       7:30-1:30 [place]  Friday:       7:30-11:30 [facility]  [address] [facility]  [address]  [phone] ask for Women's [facility]  [address] [place] [address]  [phone] ask for [facility]    Imaging Scheduling for [place]:  [phone]    Imaging Scheduling for [place]: [phone]       Urgent Care locations:    [facility] Saturday and Sunday   9 am - 5 pm    Monday-Friday   12 pm - 8 pm  Saturday and Sunday   9 am - 5 pm   [phone]     [facility] Labor and Delivery:  [phone]    If you need a medication refill, please contact your pharmacy. Please allow 3 business days for your refill to be completed.  As always, Thank you for trusting us with your healthcare needs!

## 2019-01-21 NOTE — TELEPHONE ENCOUNTER
Refill not appropriate.  Rx sent to the requesting pharmacy on 10/17/2018 for a 3 month supply with an additional 1 refills.    Keila Menchaca RN

## 2019-01-21 NOTE — PROGRESS NOTES
Patient presents for routine prenatal visit at 36w4d.  Patient without complaint.   PE: See OB vitals  Body mass index is 26.11 kg/m .  Doing well.  No concerns today.  No vaginal bleeding, LOF, contractions.  No HA, RUQ pain, N/V, visual changes.  Questions asked and answered.      Rafi Shaw MD FACOG

## 2019-01-22 ENCOUNTER — MYC MEDICAL ADVICE (OUTPATIENT)
Dept: OBGYN | Facility: CLINIC | Age: 29
End: 2019-01-22

## 2019-01-22 ENCOUNTER — NURSE TRIAGE (OUTPATIENT)
Dept: NURSING | Facility: CLINIC | Age: 29
End: 2019-01-22

## 2019-01-22 NOTE — TELEPHONE ENCOUNTER
"Reason for Call/Nurse Assessment:  27 y/o who is also 36w5d, lost mucous plug, she is dialed and effaced, tonight she has had three episodes of vomiting. She wants to know if the vomiting is part of the going into labor or if something else    RN Action/Disposiiton:  Reviewed protocols for vomiting, advised mom that this is not a common pre labor sign, most likely viral or mild food poisoning. Discussed ORS, stomach rest, Call back if vomiting is continuous for 8 hours or if she develops fever. RN advised to call back with any changes, worsening of symptoms, and questions or concerns.     Alia Arce RN - [place] Nurse Advisor  01/22/2019   2:11AM    Additional Information    Negative: Shock suspected (e.g., cold/pale/clammy skin, too weak to stand, low BP, rapid pulse)    Negative: Difficult to awaken or acting confused  (e.g., disoriented, slurred speech)    Negative: Sounds like a life-threatening emergency to the triager    Negative: [1] Vomiting AND [2] contains red blood or black (\"coffee ground\") material  (Exception: few red streaks in vomit that only happened once)    Negative: Severe pain in one eye    Negative: Recent head injury (within last 3 days)    Negative: Recent abdominal injury (within last 3 days)    Negative: [1] Insulin-dependent diabetes (Type I) AND [2] glucose > 400 mg/dl (22 mmol/l)    Negative: [1] SEVERE vomiting (e.g., 6 or more times/day) AND [2] present > 8 hours    Negative: [1] MODERATE vomiting (e.g., 3 - 5 times/day) AND [2] age > 60    Negative: Severe headache (e.g., excruciating) (Exception: similar to previous migraines)    Negative: High-risk adult (e.g., diabetes mellitus, brain tumor, V-P shunt, hernia)    Negative: [1] Drinking very little AND [2] dehydration suspected (e.g., no urine > 12 hours, very dry mouth, very lightheaded)    Negative: Patient sounds very sick or weak to the triager    Negative: [1] MILD to MODERATE vomiting (e.g., 1-5 times/day) AND [2] abdomen " looks much more swollen than usual    Negative: [1] Constant abdominal pain AND [2] present > 2 hours    Negative: [1] Vomiting AND [2] contains bile (green color)    Negative: [1] Fever > 103 F (39.4 C) AND [2] not able to get the fever down using Fever Care Advice    Negative: Taking any of the following medications: digoxin (Lanoxin), lithium, theophylline, phenytoin (Dilantin)    Negative: [1] Fever > 100.5 F (38.1 C) AND [2] weak immune system (e.g., HIV positive, cancer chemo, splenectomy, organ transplant, chronic steroids)    Negative: [1] Fever > 101 F (38.3 C) AND [2] bedridden (e.g., nursing home patient, CVA, chronic illness, recovering from surgery)    Negative: [1] Fever > 101 F (38.3 C) AND [2] age > 60    Negative: [1] MILD or MODERATE vomiting AND [2] present > 48 hours (2 days) (Exception: mild vomiting with associated diarrhea)    Negative: Fever present > 3 days (72 hours)    Negative: Vomiting a prescription medication    Negative: [1] MILD vomiting with diarrhea AND [2] present > 5 days    Negative: Alcohol or drug abuse, known or suspected    Negative: Vomiting is a chronic symptom (recurrent or ongoing AND present > 4 weeks)    Negative: [1] SEVERE vomiting (e.g., 6 or more times/day, vomits everything) BUT [2] hydrated (all triage questions negative)    MILD or MODERATE vomiting (e.g., 1 - 5 times / day) (all triage questions negative)    Protocols used: VOMITING-ADULT-

## 2019-01-22 NOTE — TELEPHONE ENCOUNTER
"Phone call placed to pt regarding Mychart message. Pt reports vaginal discharge, changing pad every 2 hours. Pt doesn't think it's her water breaking. Discussed \"little man isn't as active as usual\".  Pt had been advised to drink water, eat dinner, take Tylenol 1000 mg every 6-8 hours while awake (do NOT exceed 3000 mg in a 24 hour time frame) change pad and lay down on left side for an hour or two, do fetal kick counts. If anything changes or feels a gush of fluid to call back.  Advised after hours calls will be directed to FNA. Pt reassured and agrees with plan of care. Nadege Rosas RN on 1/22/2019 at 5:07 PM    "

## 2019-01-23 ENCOUNTER — MYC MEDICAL ADVICE (OUTPATIENT)
Dept: OBGYN | Facility: CLINIC | Age: 29
End: 2019-01-23

## 2019-01-24 ENCOUNTER — PRENATAL OFFICE VISIT (OUTPATIENT)
Dept: OBGYN | Facility: CLINIC | Age: 29
End: 2019-01-24
Payer: COMMERCIAL

## 2019-01-24 VITALS
HEIGHT: 65 IN | HEART RATE: 88 BPM | BODY MASS INDEX: 26.76 KG/M2 | OXYGEN SATURATION: 96 % | DIASTOLIC BLOOD PRESSURE: 73 MMHG | TEMPERATURE: 97.9 F | WEIGHT: 160.6 LBS | SYSTOLIC BLOOD PRESSURE: 120 MMHG

## 2019-01-24 DIAGNOSIS — O36.8130 DECREASED FETAL MOVEMENT AFFECTING MANAGEMENT OF PREGNANCY IN THIRD TRIMESTER, SINGLE OR UNSPECIFIED FETUS: Primary | ICD-10-CM

## 2019-01-24 PROCEDURE — 59025 FETAL NON-STRESS TEST: CPT | Performed by: NURSE PRACTITIONER

## 2019-01-24 PROCEDURE — 99207 ZZC PRENATAL VISIT: CPT | Performed by: NURSE PRACTITIONER

## 2019-01-24 SDOH — HEALTH STABILITY: MENTAL HEALTH: HOW OFTEN DO YOU HAVE A DRINK CONTAINING ALCOHOL?: NEVER

## 2019-01-24 ASSESSMENT — PAIN SCALES - GENERAL: PAINLEVEL: MODERATE PAIN (5)

## 2019-01-24 ASSESSMENT — MIFFLIN-ST. JEOR: SCORE: 1468.32

## 2019-01-24 NOTE — TELEPHONE ENCOUNTER
Attempted to reach pt by phone to advise that she go into the clinic today for an NST.  Left message to call clinic back.  Also, sent a [employer] message advising an NST at the clinic today.    Per 3rd trimester Decreased fetal movement protocol from the telephone triage for OB/GYN book, if pt is experiencing lesss than five movements per hour she should go to the clinic for an NST.    Keila Menchaca RN

## 2019-01-24 NOTE — PROGRESS NOTES
Patient presents for problem prenatal visit. Prenatal flowsheet reviewed and updated as needed.  Denies vaginal bleeding, loss of fluid, contractions or cramping. Denies HA, N/V, RUQ pain and vision changes.  Patient with complaint. Patient has sent message with concerns of decrease in fetal movement since her last visit. Tried all the recommendations and really focused on counting movements and noted 6 in 2 hours. Had been having more discharge, but that has become normal again. No contractions, vaginal bleeding. Patient was advised to make appointment. Did state she began to notice more and larger movements upon arrival to clinic.  Advice as per Anticipatory Guidance/Checklist updated.  PE: See OB vitals    Decreased Fetal Movement:  NST IS:  Reactive (2 accl > 15 BPM in 20 min., each lasting approx. 15 seconds)  NST Baseline Rate 145  Variability:  Average  Accelerations:Present  Variable Decelerations:No  Other Decelerations:No  Contractions: None    Further Comments:  Fetal movement felt by patient while on NST and heard as well. Patient will continue to monitor movements and do kick counts daily. Warning signs to monitor for and report immediately discussed with patient and she verbalizes understanding. She is aware of when to call vs when to proceed to L&D.    Questions asked and answered. Keep next OB visit next week with Dr. Shaw.    Gita BEGUM CNP

## 2019-01-30 ENCOUNTER — PRENATAL OFFICE VISIT (OUTPATIENT)
Dept: OBGYN | Facility: CLINIC | Age: 29
End: 2019-01-30
Payer: COMMERCIAL

## 2019-01-30 VITALS
DIASTOLIC BLOOD PRESSURE: 83 MMHG | WEIGHT: 160 LBS | BODY MASS INDEX: 26.42 KG/M2 | OXYGEN SATURATION: 98 % | SYSTOLIC BLOOD PRESSURE: 126 MMHG | HEART RATE: 94 BPM

## 2019-01-30 DIAGNOSIS — Z34.80 SUPERVISION OF OTHER NORMAL PREGNANCY, ANTEPARTUM: Primary | ICD-10-CM

## 2019-01-30 DIAGNOSIS — O47.03 PRETERM UTERINE CONTRACTIONS, ANTEPARTUM, THIRD TRIMESTER: ICD-10-CM

## 2019-01-30 DIAGNOSIS — O47.9 UTERINE CONTRACTIONS DURING PREGNANCY: ICD-10-CM

## 2019-01-30 PROCEDURE — 99207 ZZC PRENATAL VISIT: CPT | Performed by: OBSTETRICS & GYNECOLOGY

## 2019-01-30 PROCEDURE — 59025 FETAL NON-STRESS TEST: CPT | Performed by: OBSTETRICS & GYNECOLOGY

## 2019-01-30 NOTE — PROGRESS NOTES
Patient presents for routine prenatal visit at 37w6d.  Patient with complaint. She is miserable, reports significant pelvic/bladder pressure and isn't sleeping much  PE: See OB vitals  Body mass index is 26.42 kg/m .  No vaginal bleeding, LOF, contractions.  No HA, RUQ pain, N/V, visual changes.  Questions asked and answered.    Pelvic pain/pressure  NST IS:  Reactive (2 accl > 15 BPM in 20 min., each lasting approx. 15 seconds)  NST Baseline Rate 145  Variability:  Average  Accelerations:Present  Variable Decelerations:No  Other Decelerations:none  Contractions: Irreg    Further Comments:      Plans:  Elective IOL at 39 weeks  She requests elective IOL.  Will plan at 39 weeks  Rafi Shaw MD FACOG

## 2019-01-31 ENCOUNTER — TELEPHONE (OUTPATIENT)
Dept: OBGYN | Facility: CLINIC | Age: 29
End: 2019-01-31

## 2019-01-31 ENCOUNTER — MYC MEDICAL ADVICE (OUTPATIENT)
Dept: OBGYN | Facility: CLINIC | Age: 29
End: 2019-01-31

## 2019-01-31 NOTE — TELEPHONE ENCOUNTER
Patient scheduled for induction on 2/7/19.  Patient to call L&D at 6:00 am to verify arrival time of 7:00 am.  Left voicemail for patient to call.  Induction form faxed to [facility] [place] [facility].

## 2019-03-17 ENCOUNTER — MYC REFILL (OUTPATIENT)
Dept: OBGYN | Facility: CLINIC | Age: 29
End: 2019-03-17

## 2019-03-17 DIAGNOSIS — R10.13 DYSPEPSIA: ICD-10-CM

## 2019-03-18 NOTE — TELEPHONE ENCOUNTER
"H2 Blockers Protocol Passed   ranitidine (ZANTAC) 150 MG tablet   Rerun Protocol (3/17/2019 3:59 PM)      Patient is age 12 or older         Recent (12 mo) or future (30 days) visit within the authorizing provider's specialty      Patient had office visit in the last 12 months or has a visit in the next 30 days with authorizing provider or within the authorizing provider's specialty.  See \"Patient Info\" tab in inbasket, or \"Choose Columns\" in Meds & Orders section of the refill encounter.              Medication is active on med list        Prescription approved per [facility] [place] [facility].    Keila Menchaca RN    "

## 2019-03-20 ENCOUNTER — PRENATAL OFFICE VISIT (OUTPATIENT)
Dept: OBGYN | Facility: CLINIC | Age: 29
End: 2019-03-20
Payer: COMMERCIAL

## 2019-03-20 VITALS
SYSTOLIC BLOOD PRESSURE: 137 MMHG | TEMPERATURE: 98.4 F | DIASTOLIC BLOOD PRESSURE: 72 MMHG | HEIGHT: 65 IN | HEART RATE: 70 BPM | WEIGHT: 137.4 LBS | BODY MASS INDEX: 22.89 KG/M2

## 2019-03-20 PROCEDURE — 99207 ZZC POST PARTUM EXAM: CPT | Performed by: OBSTETRICS & GYNECOLOGY

## 2019-03-20 ASSESSMENT — MIFFLIN-ST. JEOR: SCORE: 1363.08

## 2019-03-20 NOTE — PROGRESS NOTES
Xiomara is here for a postpartum checkup.    She had a   Obstetric History       T1      L1     SAB0   TAB0   Ectopic0   Multiple0   Live Births1       # Outcome Date GA Lbr Juan/2nd Weight Sex Delivery Anes PTL Lv   2 Term 16 38w1d  2.58 kg (5 lb 11 oz) F  EPI N CALE      Apgar1:  9                Apgar5: 9   1                   Since delivery, she has been breast feeding.  She has not had a normal menses.  She has had intercourse.  Patient screened for postpartum depression and complaints are NEGATIVE. Screening has also been completed for intimate partner violence. She would like to discuss IUD.      PE: LMP 05/10/2018 (Exact Date)   There is no height or weight on file to calculate BMI.    General Appearance:  healthy, alert, active, no distress  Cardiovascular:  Regular rate and Rhythm  Lungs:  Clear, without wheeze, rale or rhonchi  Abdomen: Benign, Soft, flat, non-tender, No masses, organomegaly, No inguinal nodes and Bowel sounds normoactive.   Pelvic:       - Ext: Vulva and perineum are normal without lesion, mass or discharge        - Bladder: no tenderness, no masses       - Vagina: Normal mucosa, no discharge       - Cervix: multiparous       - Uterus:Normal shape, position and consistency       - Adnexa: Normal without masses or tenderness       - Rectal: deferred    A/P  Routine Postpartum     - I discussed the new pap recommendations regarding screening.  Explained the rationale for increased intervals between paps.  Questions asked and answered.  She does agree to this regiment.   - Pap was not performed   - Contraception: Reviewed the risks, benefits , alternatives and side effects of birth control options including abstinence, oral contraceptives, transdermal patch, transvaginal ring, Depo-Provera, IUD, hormonal implants, condoms, diaphrams,and permanent sterilization.  Reviewed need for back-up contraception for the first month of hormonal methods.  Reviewed that only  abstinence and condoms provide protection from STD's.  Patient desires an IUD for birth control.  She will abstain for 2 weeks and come in for a UPT and placement

## 2019-03-20 NOTE — PATIENT INSTRUCTIONS
If you have any questions regarding your visit, Please contact your care team.    [facility]: [phone]      Women s Health CLINIC HOURS TELEPHONE NUMBER   MD Aleena Perkins CMA Lisa -    BRIDGETT Goff       Monday:       7:30-4:30 [place]  Wednesday:       7:30-4:30 [place]  Thursday:       7:30-1:30 [place]  Friday:       7:30-11:30 [facility]  [address] [facility] [place]  [address]  [phone] ask for Women's [facility]  [address] [place] [address]  [phone] ask for [facility]    Imaging Scheduling for [place]:  [phone]    Imaging Scheduling for [place]: [phone]       Urgent Care locations:    [facility] Saturday and Sunday   9 am - 5 pm    Monday-Friday   12 pm - 8 pm  Saturday and Sunday   9 am - 5 pm   [phone]     [facility] Labor and Delivery:  [phone]    If you need a medication refill, please contact your pharmacy. Please allow 3 business days for your refill to be completed.  As always, Thank you for trusting us with your healthcare needs!

## 2019-04-03 ENCOUNTER — OFFICE VISIT (OUTPATIENT)
Dept: OBGYN | Facility: CLINIC | Age: 29
End: 2019-04-03
Payer: COMMERCIAL

## 2019-04-03 VITALS
BODY MASS INDEX: 22.59 KG/M2 | OXYGEN SATURATION: 99 % | HEART RATE: 74 BPM | SYSTOLIC BLOOD PRESSURE: 127 MMHG | DIASTOLIC BLOOD PRESSURE: 74 MMHG | WEIGHT: 136.8 LBS

## 2019-04-03 DIAGNOSIS — Z32.00 PREGNANCY EXAMINATION OR TEST, PREGNANCY UNCONFIRMED: Primary | ICD-10-CM

## 2019-04-03 DIAGNOSIS — Z30.014 ENCOUNTER FOR INITIAL PRESCRIPTION OF INTRAUTERINE CONTRACEPTIVE DEVICE (IUD): ICD-10-CM

## 2019-04-03 LAB — HCG UR QL: NEGATIVE

## 2019-04-03 PROCEDURE — 58300 INSERT INTRAUTERINE DEVICE: CPT | Performed by: OBSTETRICS & GYNECOLOGY

## 2019-04-03 PROCEDURE — 81025 URINE PREGNANCY TEST: CPT | Performed by: OBSTETRICS & GYNECOLOGY

## 2019-04-03 NOTE — PATIENT INSTRUCTIONS
If you have any questions regarding your visit, Please contact your care team.    [facility] Access [facility]: [phone]      Women s Health CLINIC HOURS TELEPHONE NUMBER   MD Aleena Perkins CMA Lisa -    BRIDGETT Goff       Monday:       7:30-4:30 [place]  Wednesday:       7:30-4:30 [place]  Thursday:       7:30-1:30 [place]  Friday:       7:30-11:30 [facility]  [address] [facility] [place]  [address]  [phone] ask for [facility]  [address] [place] [address]  [phone] ask for [facility]    Imaging Scheduling for [place]:  [phone]    Imaging Scheduling for [place]: [phone]       Urgent Care locations:    [facility] Saturday and Sunday   9 am - 5 pm    Monday-Friday   12 pm - 8 pm  Saturday and Sunday   9 am - 5 pm   [phone]     [facility] Labor and Delivery:  [phone]    If you need a medication refill, please contact your pharmacy. Please allow 3 business days for your refill to be completed.  As always, Thank you for trusting us with your healthcare needs!

## 2019-04-03 NOTE — PROGRESS NOTES
Xiomara Doherty is a 28 year old  Women who presents today requesting placement of an Mirena iud.    The patient meets and is agreeable to the following conditions:  She currently is in a stable, monogamous relationship.  There is no previous history of pelvic inflammatory disease.  There is no previous history of ectopic pregnancy.  She is willing to check monthly for the IUD string.  There is no history of unresolved abnormal uterine bleeding.   There is no history of an unresolved abnormal PAP smear.   She has no history of Chang's disease or an allergy to copper (for Paraguard).   She has had a PAP smear within the past 1 year.  She denies the possibility of pregnancy.   Pregnancy test today is negative.     We discussed risks, benefits, and alternatives including but not limited to:   Possibility of pregnancy and ectopic pregnancy.  Possibility of pelvic inflammatory disease, particularly with new partners.  Risk of uterine perforation, migration or IUD expulsion. Discussed that surgery might be required for removal in these cases.  Possibility of difficult removal.  Spotting or heavy bleeding.  Cramping, pain or infection during or after insertion.    The patient was offered patient information on the IUD and the patient education brochure from the .  The patient has given consent to proceed with placement of the IUD.  She wishes to proceed.  All questions answered.    PROCEDURE:    Type of IUD: Mirena   She is placed in a dorsal lithotomy potion and a pelvic exam is performed to determine the position of the uterus.  The cervix is identified and cleaned with betadine.  A single tooth tenaculum is applied to the anterior lip of the cervix for stabilization.   The uterus sounded to 7.0 cm. (Target sound depth is 6.5 cm to 8.5 cm.)  The IUD is inserted into the uterus under sterile conditions in the usual fashion.    Lot number [id number] and expiration date 6/2021.  The IUD string is then cut to  2.0 cm.    The patient tolerated this procedure without immediate complication.  The patient is to return or call immediately for any unexplained fever, abdominal or pelvic pain, excessive bleeding, possibility of pregnancy, foul-smelling discharge, sense that the IUD has been expelled.  All questions were answered.    Return to clinic in 1 month for IUD check  Rafi Shaw MD FACOG

## 2019-04-09 ENCOUNTER — OFFICE VISIT (OUTPATIENT)
Dept: PEDIATRICS | Facility: CLINIC | Age: 29
End: 2019-04-09
Payer: COMMERCIAL

## 2019-04-09 VITALS
OXYGEN SATURATION: 98 % | HEART RATE: 77 BPM | HEIGHT: 65 IN | DIASTOLIC BLOOD PRESSURE: 65 MMHG | TEMPERATURE: 98.1 F | BODY MASS INDEX: 22.56 KG/M2 | SYSTOLIC BLOOD PRESSURE: 99 MMHG | WEIGHT: 135.4 LBS

## 2019-04-09 DIAGNOSIS — K59.09 CHRONIC CONSTIPATION: ICD-10-CM

## 2019-04-09 DIAGNOSIS — K21.9 GASTROESOPHAGEAL REFLUX DISEASE WITHOUT ESOPHAGITIS: Primary | ICD-10-CM

## 2019-04-09 LAB
ERYTHROCYTE [DISTWIDTH] IN BLOOD BY AUTOMATED COUNT: 17.1 % (ref 10–15)
HCT VFR BLD AUTO: 40.4 % (ref 35–47)
HGB BLD-MCNC: 12.6 G/DL (ref 11.7–15.7)
MCH RBC QN AUTO: 27.4 PG (ref 26.5–33)
MCHC RBC AUTO-ENTMCNC: 31.2 G/DL (ref 31.5–36.5)
MCV RBC AUTO: 88 FL (ref 78–100)
PLATELET # BLD AUTO: 320 10E9/L (ref 150–450)
RBC # BLD AUTO: 4.6 10E12/L (ref 3.8–5.2)
TSH SERPL DL<=0.005 MIU/L-ACNC: 0.93 MU/L (ref 0.4–4)
WBC # BLD AUTO: 7.6 10E9/L (ref 4–11)

## 2019-04-09 PROCEDURE — 36415 COLL VENOUS BLD VENIPUNCTURE: CPT | Performed by: NURSE PRACTITIONER

## 2019-04-09 PROCEDURE — 99214 OFFICE O/P EST MOD 30 MIN: CPT | Performed by: NURSE PRACTITIONER

## 2019-04-09 PROCEDURE — 84443 ASSAY THYROID STIM HORMONE: CPT | Performed by: NURSE PRACTITIONER

## 2019-04-09 PROCEDURE — 85027 COMPLETE CBC AUTOMATED: CPT | Performed by: NURSE PRACTITIONER

## 2019-04-09 RX ORDER — SUCRALFATE ORAL 1 G/10ML
1 SUSPENSION ORAL 4 TIMES DAILY
Qty: 420 ML | Refills: 3 | Status: SHIPPED | OUTPATIENT
Start: 2019-04-09 | End: 2020-08-04

## 2019-04-09 ASSESSMENT — MIFFLIN-ST. JEOR: SCORE: 1350.05

## 2019-04-09 NOTE — PROGRESS NOTES
SUBJECTIVE:   Xiomara Doherty is a 28 year old female who presents to clinic today for the following   health issues:    1. Orders for endoscopy    Medication Followup of zantac    Taking Medication as prescribed: yes    Side Effects:  None    Medication Helping Symptoms:  Yes, would like to increase dosage   Had some discussion about persistent GERD and was scoped in the past   Is 2 months post partum   Has terrible reflux and is taking zantac and is supposed to be taking one twice a day and has been taking 2 twice a day and sometimes an extra   Drinks milk because it makes her feel better   Has been on prilosec, nexium and protonix in the past   Has not been on prilosec for years     Constipation     Onset: ongoing for awhile    Description:   Frequency of bowel movements: 1 once a week.  Stool consistency: hard    Progression of Symptoms:  worsening    Accompanying Signs & Symptoms:  Abdominal pain (cramping?): YES  Blood in stool: no,  Only when wiping, hemorrhoids   Rectal pain: no   Nausea/vomiting: no   Weight loss or gain: YES- losing    History:   History of abdominal surgery: YES    Precipitating factors:   Recent use of narcotics, anticholinergics, calcium channel blockers, antacids, or iron supplements: YES- zantac   Chronic Laxative Use: YES         Therapies Tried and outcome: laxatives and stool softeners  Will have a BM about once a week   When she goes will be like pushing out a soft ball   Has been taking      Additional history: as documented    Reviewed  and updated as needed this visit by clinical staff      Reviewed and updated as needed this visit by Provider         Patient Active Problem List   Diagnosis     Menorrhagia     Back pain     Urinary frequency     Adjustment disorder with anxiety     Generalized anxiety disorder     Psoriasis     Esophageal reflux     New daily persistent headache     Post-concussion headache     Supervision of other normal pregnancy, antepartum     Need  for rhogam due to Rh negative mother     Past Surgical History:   Procedure Laterality Date     ARTHROSCOPY KNEE  7/26/2011    Procedure:ARTHROSCOPY KNEE; diagnostic; Surgeon:VIOLETTA JIM; Location:[facility] OR     ARTHROSCOPY KNEE RT/LT  10/20/09    right knee     ARTHROSCOPY KNEE WITH RETINACULAR RELEASE  7/26/2011    Procedure:ARTHROSCOPY KNEE WITH RETINACULAR RELEASE; lateral release; Surgeon:VIOLETTA JIM; Location:[facility] OR     BIOPSY       COLONOSCOPY  06/21/12    [facility]     COLONOSCOPY WITH CO2 INSUFFLATION N/A 10/29/2014    Procedure: COLONOSCOPY WITH CO2 INSUFFLATION;  Surgeon: Duane, William Charles, MD;  Location: [facility] OR     ESOPHAGOSCOPY, GASTROSCOPY, DUODENOSCOPY (EGD), COMBINED  2/3/2011    COMBINED ESOPHAGOSCOPY, GASTROSCOPY, DUODENOSCOPY (EGD), BIOPSY SINGLE OR MULTIPLE performed by DINAH VALDEZ at [facility]     ESOPHAGOSCOPY, GASTROSCOPY, DUODENOSCOPY (EGD), COMBINED N/A 10/29/2014    Procedure: COMBINED ESOPHAGOSCOPY, GASTROSCOPY, DUODENOSCOPY (EGD), BIOPSY SINGLE OR MULTIPLE;  Surgeon: Duane, William Charles, MD;  Location: [facility] OR     HC REMOVE TONSILS/ADENOIDS,<11 Y/O  1997     HC UGI ENDOSCOPY, SIMPLE EXAM  06/21/12    [facility]     IRRIGATION AND DEBRIDEMENT KNEE, COMBINED  7/26/2011    Procedure:COMBINED IRRIGATION AND DEBRIDEMENT KNEE; dedridement; Surgeon:VIOLETTA JIM; Location:[facility] OR     SOFT TISSUE SURGERY         Social History     Tobacco Use     Smoking status: Never Smoker     Smokeless tobacco: Never Used   Substance Use Topics     Alcohol use: No     Frequency: Never     Family History   Problem Relation Age of Onset     Cancer Father         skin cancer on face     Hypertension Father      Hypertension Paternal Grandfather      Prostate Cancer Maternal Grandfather      Asthma Sister         sports         Current Outpatient Medications   Medication Sig Dispense Refill     ranitidine (ZANTAC) 150 MG tablet Take 1 tablet (150 mg) by mouth 2 times daily 180 tablet 0     Allergies  "  Allergen Reactions     Nuts Shortness Of Breath     Pineapple Shortness Of Breath and Anaphylaxis     Pt. Allergic to pineapple     Fentanyl Itching     Vicodin [Hydrocodone-Acetaminophen] Nausea and Vomiting     BP Readings from Last 3 Encounters:   04/09/19 99/65   04/03/19 127/74   03/20/19 137/72    Wt Readings from Last 3 Encounters:   04/09/19 61.4 kg (135 lb 6.4 oz)   04/03/19 62.1 kg (136 lb 12.8 oz)   03/20/19 62.3 kg (137 lb 6.4 oz)                  Labs reviewed in EPIC    ROS:  CONSTITUTIONAL:NEGATIVE for fever, chills, change in weight  ENT/MOUTH: NEGATIVE for ear, mouth and throat problems  RESP:NEGATIVE for significant cough or SOB  CV: NEGATIVE for chest pain, palpitations or peripheral edema  GI: POSITIVE for abdominal pain epigastric, constipation, heartburn or reflux and Hx GERD and NEGATIVE for jaundice, melena, vomiting and weight loss  MUSCULOSKELETAL: NEGATIVE for significant arthralgias or myalgia  NEURO: NEGATIVE for weakness, dizziness or paresthesias  HEME/ALLERGY/IMMUNE: NEGATIVE for bleeding problems    OBJECTIVE:     BP 99/65 (BP Location: Right arm, Patient Position: Sitting, Cuff Size: Adult Regular)   Pulse 77   Temp 98.1  F (36.7  C) (Temporal)   Ht 1.651 m (5' 5\")   Wt 61.4 kg (135 lb 6.4 oz)   SpO2 98%   Breastfeeding? Yes   BMI 22.53 kg/m    Body mass index is 22.53 kg/m .  GENERAL APPEARANCE: alert, active and no distress  NECK: no adenopathy, no asymmetry, masses, or scars and thyroid normal to palpation  RESP: lungs clear to auscultation - no rales, rhonchi or wheezes  CV: regular rates and rhythm  ABDOMEN: mild tenderness in the epigastric area, no rebound tenderness, no guarding or rigidity, no CVA tenderness, no inguinal nodes, no masses noted      MS: extremities normal- no gross deformities noted  SKIN: no suspicious lesions or rashes  NEURO: Normal strength and tone, mentation intact and speech normal  PSYCH: mentation appears normal and affect " normal/bright  MENTAL STATUS EXAM:  Appearance/Behavior: No apparent distress, Casually groomed and Dressed appropriately for weather  Speech: Normal  Mood/Affect: normal affect  Insight: Adequate    Diagnostic Test Results:  Results for orders placed or performed in visit on 04/09/19   CBC with platelets   Result Value Ref Range    WBC 7.6 4.0 - 11.0 10e9/L    RBC Count 4.60 3.8 - 5.2 10e12/L    Hemoglobin 12.6 11.7 - 15.7 g/dL    Hematocrit 40.4 35.0 - 47.0 %    MCV 88 78 - 100 fl    MCH 27.4 26.5 - 33.0 pg    MCHC 31.2 (L) 31.5 - 36.5 g/dL    RDW 17.1 (H) 10.0 - 15.0 %    Platelet Count 320 150 - 450 10e9/L   TSH with free T4 reflex   Result Value Ref Range    TSH 0.93 0.40 - 4.00 mU/L       ASSESSMENT/PLAN:     Xiomara was seen today for heartburn, orders and constipation.    Diagnoses and all orders for this visit:    Gastroesophageal reflux disease without esophagitis  -     GASTROENTEROLOGY ADULT REF PROCEDURE ONLY [facility] [phone]  -     sucralfate (CARAFATE) 1 GM/10ML suspension; Take 10 mLs (1 g) by mouth 4 times daily  -     CBC with platelets  -     TSH with free T4 reflex    Chronic constipation  -     Cancel: GASTROENTEROLOGY ADULT REF PROCEDURE ONLY [facility] [phone]; No Preference - GI Provider Only  -     TSH with free T4 reflex    See Patient Instructions  Patient Instructions     PLAN:   1.   Symptomatic therapy suggested: increase fluids and call prn if symptoms persist or worsen.  Will start on carafate  A high fiber diet with plenty of fluids (up to 8 glasses of water daily) is suggested to relieve these symptoms.  Metamucil, 1 tablespoon once or twice daily can be used to keep bowels regular if needed.  2.  Orders Placed This Encounter   Medications     sucralfate (CARAFATE) 1 GM/10ML suspension     Sig: Take 10 mLs (1 g) by mouth 4 times daily     Dispense:  420 mL     Refill:  3     Orders Placed This Encounter   Procedures     CBC with platelets     TSH with free  T4 reflex     GASTROENTEROLOGY ADULT REF PROCEDURE ONLY [facility] [phone]     GASTROENTEROLOGY ADULT REF PROCEDURE ONLY [facility] [phone]; No Preference - GI Provider Only       3. Patient needs to follow up in if no improvement,or sooner if worsening of symptoms or other symptoms develop.  CONSULTATION/REFERRAL to Gastroenterology for endoscopy and colonoscopy   Will follow up and/or notify patient of  results via My Chart to determine further need for followup    KEL Goldstein CNP  [facility]

## 2019-04-09 NOTE — NURSING NOTE
"Chief Complaint   Patient presents with     Heartburn     refill on medication and increase the dosage      Orders     for endoscopy      Constipation     ongoing constipation       Initial BP 99/65 (BP Location: Right arm, Patient Position: Sitting, Cuff Size: Adult Regular)   Pulse 77   Temp 98.1  F (36.7  C) (Temporal)   Ht 1.651 m (5' 5\")   Wt 61.4 kg (135 lb 6.4 oz)   SpO2 98%   Breastfeeding? Yes   BMI 22.53 kg/m   Estimated body mass index is 22.53 kg/m  as calculated from the following:    Height as of this encounter: 1.651 m (5' 5\").    Weight as of this encounter: 61.4 kg (135 lb 6.4 oz).  Medication Reconciliation: complete      HARVEY Stewart      "

## 2019-04-09 NOTE — PATIENT INSTRUCTIONS
PLAN:   1.   Symptomatic therapy suggested: increase fluids and call prn if symptoms persist or worsen.  Will start on carafate  A high fiber diet with plenty of fluids (up to 8 glasses of water daily) is suggested to relieve these symptoms.  Metamucil, 1 tablespoon once or twice daily can be used to keep bowels regular if needed.  2.  Orders Placed This Encounter   Medications     sucralfate (CARAFATE) 1 GM/10ML suspension     Sig: Take 10 mLs (1 g) by mouth 4 times daily     Dispense:  420 mL     Refill:  3     Orders Placed This Encounter   Procedures     CBC with platelets     TSH with free T4 reflex     [facility] [phone]     GASTROENTEROLOGY ADULT REF PROCEDURE ONLY [facility] [phone]; No Preference - GI Provider Only       3. Patient needs to follow up in if no improvement,or sooner if worsening of symptoms or other symptoms develop.  CONSULTATION/REFERRAL to Gastroenterology for endoscopy and colonoscopy   Will follow up and/or notify patient of  results via My Chart to determine further need for followup

## 2019-04-09 NOTE — RESULT ENCOUNTER NOTE
Kasia Doherty,    Attached are your test results.  -Normal red blood cell (hgb) levels, normal white blood cell count and normal platelet levels.  -TSH (thyroid stimulating hormone) level is normal which indicates normal thyroid function.   Please contact us if you have any questions.    Eneida Aviles, CNP

## 2019-04-10 ENCOUNTER — TELEPHONE (OUTPATIENT)
Dept: PEDIATRICS | Facility: CLINIC | Age: 29
End: 2019-04-10

## 2019-04-10 DIAGNOSIS — K59.09 CHRONIC CONSTIPATION: Primary | ICD-10-CM

## 2019-04-10 NOTE — TELEPHONE ENCOUNTER
----- Message from William Charles Duane, MD sent at 4/10/2019  8:14 AM CDT -----  Regarding: scheduled procedures  Hi Eneida    I see that Ms. Doherty is scheduled for EGD and colonoscopy with me later in the month because of acid reflux symptoms and constipation.  As you know, I did both procedures on her 5 years ago.  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  However, many insurance companies will not cover a colonoscopy done for constipation because the yield is so low.  I suggest she check with her insurance company to be sure she is covered for that.  Also is she breast feeding?  If so, she will have to store some breast milk to give the baby for the 24-48 hours following the procedures.  William Duane

## 2019-04-10 NOTE — TELEPHONE ENCOUNTER
Please call patient and let her know reviewed her case with GI and would recommend getting the EGD as noted by gastroenterology but would refer her to GI instead for consult instead  of doing another colonoscopy   However please see note from GI related also to breast feeding

## 2019-04-10 NOTE — TELEPHONE ENCOUNTER
Called patient, relayed message & patient verbalized understanding. Transferred to GI scheduling.   Alicia Sagastume RN

## 2019-04-22 ENCOUNTER — ANESTHESIA EVENT (OUTPATIENT)
Dept: SURGERY | Facility: AMBULATORY SURGERY CENTER | Age: 29
End: 2019-04-22

## 2019-04-22 RX ORDER — HYDROMORPHONE HYDROCHLORIDE 1 MG/ML
.3-.5 INJECTION, SOLUTION INTRAMUSCULAR; INTRAVENOUS; SUBCUTANEOUS EVERY 10 MIN PRN
Status: CANCELLED | OUTPATIENT
Start: 2019-04-22

## 2019-04-22 RX ORDER — ONDANSETRON 4 MG/1
4 TABLET, ORALLY DISINTEGRATING ORAL EVERY 30 MIN PRN
Status: CANCELLED | OUTPATIENT
Start: 2019-04-22

## 2019-04-22 RX ORDER — OXYCODONE HYDROCHLORIDE 5 MG/1
5 TABLET ORAL EVERY 4 HOURS PRN
Status: CANCELLED | OUTPATIENT
Start: 2019-04-22

## 2019-04-22 RX ORDER — ONDANSETRON 2 MG/ML
4 INJECTION INTRAMUSCULAR; INTRAVENOUS EVERY 30 MIN PRN
Status: CANCELLED | OUTPATIENT
Start: 2019-04-22

## 2019-04-22 RX ORDER — NALOXONE HYDROCHLORIDE 0.4 MG/ML
.1-.4 INJECTION, SOLUTION INTRAMUSCULAR; INTRAVENOUS; SUBCUTANEOUS
Status: CANCELLED | OUTPATIENT
Start: 2019-04-22 | End: 2019-04-23

## 2019-04-22 RX ORDER — SODIUM CHLORIDE, SODIUM LACTATE, POTASSIUM CHLORIDE, CALCIUM CHLORIDE 600; 310; 30; 20 MG/100ML; MG/100ML; MG/100ML; MG/100ML
INJECTION, SOLUTION INTRAVENOUS CONTINUOUS
Status: CANCELLED | OUTPATIENT
Start: 2019-04-22

## 2019-04-23 ENCOUNTER — OFFICE VISIT (OUTPATIENT)
Dept: PEDIATRICS | Facility: CLINIC | Age: 29
End: 2019-04-23
Payer: COMMERCIAL

## 2019-04-23 VITALS
HEIGHT: 65 IN | BODY MASS INDEX: 22.64 KG/M2 | TEMPERATURE: 98.1 F | SYSTOLIC BLOOD PRESSURE: 100 MMHG | OXYGEN SATURATION: 100 % | HEART RATE: 83 BPM | DIASTOLIC BLOOD PRESSURE: 60 MMHG | WEIGHT: 135.9 LBS

## 2019-04-23 DIAGNOSIS — K21.9 GASTROESOPHAGEAL REFLUX DISEASE WITHOUT ESOPHAGITIS: ICD-10-CM

## 2019-04-23 DIAGNOSIS — K59.09 CHRONIC CONSTIPATION: ICD-10-CM

## 2019-04-23 DIAGNOSIS — Z01.818 PREOP GENERAL PHYSICAL EXAM: Primary | ICD-10-CM

## 2019-04-23 PROCEDURE — 99214 OFFICE O/P EST MOD 30 MIN: CPT | Performed by: NURSE PRACTITIONER

## 2019-04-23 ASSESSMENT — MIFFLIN-ST. JEOR: SCORE: 1352.32
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
## 2019-04-23 NOTE — NURSING NOTE
"Chief Complaint   Patient presents with     Pre-Op Exam     DOS:4/24/19       Initial /60 (BP Location: Right arm, Patient Position: Sitting, Cuff Size: Adult Regular)   Pulse 83   Temp 98.1  F (36.7  C) (Temporal)   Ht 1.651 m (5' 5\")   Wt 61.6 kg (135 lb 14.4 oz)   SpO2 100%   Breastfeeding? Yes   BMI 22.61 kg/m   Estimated body mass index is 22.61 kg/m  as calculated from the following:    Height as of this encounter: 1.651 m (5' 5\").    Weight as of this encounter: 61.6 kg (135 lb 14.4 oz).  Medication Reconciliation: complete      HARVEY Stewart      "

## 2019-04-24 ENCOUNTER — ANESTHESIA (OUTPATIENT)
Dept: SURGERY | Facility: AMBULATORY SURGERY CENTER | Age: 29
End: 2019-04-24
Payer: COMMERCIAL

## 2019-04-24 ENCOUNTER — HOSPITAL ENCOUNTER (OUTPATIENT)
Facility: AMBULATORY SURGERY CENTER | Age: 29
Discharge: HOME | End: 2019-04-24
Attending: INTERNAL MEDICINE | Admitting: INTERNAL MEDICINE
Payer: COMMERCIAL

## 2019-04-24 VITALS
OXYGEN SATURATION: 98 % | SYSTOLIC BLOOD PRESSURE: 98 MMHG | RESPIRATION RATE: 16 BRPM | DIASTOLIC BLOOD PRESSURE: 65 MMHG | HEART RATE: 73 BPM | TEMPERATURE: 97.8 F

## 2019-04-24 VITALS — HEART RATE: 78 BPM

## 2019-04-24 LAB
HCG UR QL: NEGATIVE
UPPER GI ENDOSCOPY: NORMAL

## 2019-04-24 PROCEDURE — G8918 PT W/O PREOP ORDER IV AB PRO: HCPCS

## 2019-04-24 PROCEDURE — 88305 TISSUE EXAM BY PATHOLOGIST: CPT | Performed by: INTERNAL MEDICINE

## 2019-04-24 PROCEDURE — 81025 URINE PREGNANCY TEST: CPT | Performed by: ANESTHESIOLOGY

## 2019-04-24 PROCEDURE — 43239 EGD BIOPSY SINGLE/MULTIPLE: CPT

## 2019-04-24 PROCEDURE — G8907 PT DOC NO EVENTS ON DISCHARG: HCPCS

## 2019-04-24 RX ORDER — PROPOFOL 10 MG/ML
INJECTION, EMULSION INTRAVENOUS PRN
Status: DISCONTINUED | OUTPATIENT
Start: 2019-04-24 | End: 2019-04-24

## 2019-04-24 RX ORDER — LIDOCAINE 40 MG/G
CREAM TOPICAL
Status: DISCONTINUED | OUTPATIENT
Start: 2019-04-24 | End: 2019-04-25 | Stop reason: HOSPADM

## 2019-04-24 RX ORDER — PROPOFOL 10 MG/ML
INJECTION, EMULSION INTRAVENOUS CONTINUOUS PRN
Status: DISCONTINUED | OUTPATIENT
Start: 2019-04-24 | End: 2019-04-24

## 2019-04-24 RX ORDER — SODIUM CHLORIDE, SODIUM LACTATE, POTASSIUM CHLORIDE, CALCIUM CHLORIDE 600; 310; 30; 20 MG/100ML; MG/100ML; MG/100ML; MG/100ML
INJECTION, SOLUTION INTRAVENOUS CONTINUOUS
Status: DISCONTINUED | OUTPATIENT
Start: 2019-04-24 | End: 2019-04-25 | Stop reason: HOSPADM

## 2019-04-24 RX ORDER — ONDANSETRON 2 MG/ML
4 INJECTION INTRAMUSCULAR; INTRAVENOUS
Status: DISCONTINUED | OUTPATIENT
Start: 2019-04-24 | End: 2019-04-25 | Stop reason: HOSPADM

## 2019-04-24 RX ORDER — LIDOCAINE HYDROCHLORIDE 20 MG/ML
INJECTION, SOLUTION INFILTRATION; PERINEURAL PRN
Status: DISCONTINUED | OUTPATIENT
Start: 2019-04-24 | End: 2019-04-24

## 2019-04-24 RX ADMIN — PROPOFOL 100 MG: 10 INJECTION, EMULSION INTRAVENOUS at 07:09

## 2019-04-24 RX ADMIN — PROPOFOL 150 MCG/KG/MIN: 10 INJECTION, EMULSION INTRAVENOUS at 07:11

## 2019-04-24 RX ADMIN — PROPOFOL 40 MG: 10 INJECTION, EMULSION INTRAVENOUS at 07:11

## 2019-04-24 RX ADMIN — LIDOCAINE HYDROCHLORIDE 100 MG: 20 INJECTION, SOLUTION INFILTRATION; PERINEURAL at 07:07

## 2019-04-24 RX ADMIN — SODIUM CHLORIDE, SODIUM LACTATE, POTASSIUM CHLORIDE, CALCIUM CHLORIDE: 600; 310; 30; 20 INJECTION, SOLUTION INTRAVENOUS at 06:59

## 2019-04-24 NOTE — ANESTHESIA CARE TRANSFER NOTE
Patient: Xiomara Doherty    Procedure(s):  COMBINED ESOPHAGOSCOPY, GASTROSCOPY, DUODENOSCOPY (EGD) WITH CO2 INSUFFLATION    Diagnosis: edg/gerd without esophagitis/bmi 22.53/[place]/[phone]  PATIENT IS ALLERGIC TO FENTANYL AND PREVIOUS PROCEDURE WAS GIVEN A LOWER DOSE BUT WOKE UP DURING THE EDG  Diagnosis Additional Information: No value filed.    Anesthesia Type:   MAC     Note:  Airway :Room Air  Patient transferred to:Phase II  Comments: To Phase II. Report to RN.  VSS Resp status stable.Handoff Report: Identifed the Patient, Identified the Reponsible Provider, Reviewed the pertinent medical history, Discussed the surgical course, Reviewed Intra-OP anesthesia mangement and issues during anesthesia, Set expectations for post-procedure period and Allowed opportunity for questions and acknowledgement of understanding      Vitals: (Last set prior to Anesthesia Care Transfer)    CRNA VITALS  4/24/2019 0652 - 4/24/2019 0725      4/24/2019             SpO2:  100 %                Electronically Signed By: KEL Bass CRNA  April 24, 2019  7:25 AM
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
## 2019-04-24 NOTE — ANESTHESIA POSTPROCEDURE EVALUATION
Anesthesia POST Procedure Evaluation    Patient: Xiomara Doherty   MRN:     [id number] Gender:   female   Age:    28 year old :      1990        Preoperative Diagnosis: edg/gerd without esophagitis/bmi 22.53/[place]/[phone]  PATIENT IS ALLERGIC TO FENTANYL AND PREVIOUS PROCEDURE WAS GIVEN A LOWER DOSE BUT WOKE UP DURING THE EDG   Procedure(s):  ESOPHAGOGASTRODUODENOSCOPY, WITH CO2 INSUFFLATION   Postop Comments: No value filed.       Anesthesia Type:  MAC  MAC    Reportable Event: NO     PAIN: Uncomplicated   Sign Out status: Comfortable, Well controlled pain     PONV: No PONV   Sign Out status:  No Nausea or Vomiting     Neuro/Psych: Uneventful perioperative course   Sign Out Status: Preoperative baseline; Age appropriate mentation     Airway/Resp.: Uneventful perioperative course   Sign Out Status: Non labored breathing, age appropriate RR; Resp. Status within EXPECTED Parameters     CV: Uneventful perioperative course   Sign Out status: Appropriate BP and perfusion indices; Appropriate HR/Rhythm     Disposition:   Sign Out in:  Phase II  Recovery Course: Uneventful  Follow-Up: Not required           Last Anesthesia Record Vitals:  CRNA VITALS  2019 0652 - 2019 0752      2019             SpO2:  100 %          Last PACU Vitals:  Vitals Value Taken Time   BP     Temp     Pulse 78 2019  7:20 AM   Resp     SpO2     Temp src Skin 2019  7:15 AM   NIBP 118/94 2019  7:20 AM   Pulse 73 2019  7:21 AM   SpO2 100 % 2019  7:22 AM   Resp     Temp 96.8  F (36  C) 2019  7:15 AM   Ht Rate 72 2019  7:19 AM   Temp 2           Electronically Signed By: Jose J Fall MD, 2019, 8:05 AM

## 2019-04-26 LAB — COPATH REPORT: NORMAL

## 2019-06-13 ENCOUNTER — MYC MEDICAL ADVICE (OUTPATIENT)
Dept: PEDIATRICS | Facility: CLINIC | Age: 29
End: 2019-06-13

## 2019-06-13 ENCOUNTER — TELEPHONE (OUTPATIENT)
Dept: PEDIATRICS | Facility: CLINIC | Age: 29
End: 2019-06-13

## 2019-06-13 DIAGNOSIS — F43.22 ADJUSTMENT DISORDER WITH ANXIETY: ICD-10-CM

## 2019-06-13 DIAGNOSIS — F41.1 GENERALIZED ANXIETY DISORDER: ICD-10-CM

## 2019-06-13 RX ORDER — SERTRALINE HYDROCHLORIDE 100 MG/1
100 TABLET, FILM COATED ORAL DAILY
Qty: 90 TABLET | Refills: 1 | Status: SHIPPED | OUTPATIENT
Start: 2019-06-13 | End: 2020-08-04

## 2019-06-13 NOTE — TELEPHONE ENCOUNTER
Saw Aleksandr for her son's well child check today. Her depression screen was midly positive at 10. She does admit to a lot of anxiety. She was on zoloft after the birth of her daughter.   Wanted to let you know to see if you can restart her medication. Will schedule with you for this week or next week.

## 2019-07-01 DIAGNOSIS — R10.13 DYSPEPSIA: ICD-10-CM

## 2019-07-01 NOTE — TELEPHONE ENCOUNTER
"Requested Prescriptions   Pending Prescriptions Disp Refills     ranitidine (ZANTAC) 150 MG tablet 180 tablet 0     Sig: Take 1 tablet (150 mg) by mouth 2 times daily       H2 Blockers Protocol Passed - 7/1/2019  2:15 PM        Passed - Patient is age 12 or older        Passed - Recent (12 mo) or future (30 days) visit within the authorizing provider's specialty     Patient had office visit in the last 12 months or has a visit in the next 30 days with authorizing provider or within the authorizing provider's specialty.  See \"Patient Info\" tab in inbasket, or \"Choose Columns\" in Meds & Orders section of the refill encounter.              Passed - Medication is active on med list      Last office visit 4/3/2019    Myrna Hawk RN on 7/1/2019 at 2:31 PM    "

## 2019-07-01 NOTE — TELEPHONE ENCOUNTER
Pending Prescriptions:                       Disp   Refills    ranitidine (ZANTAC) 150 MG tablet         180 ta*0            Sig: Take 1 tablet (150 mg) by mouth 2 times daily

## 2019-07-27 ENCOUNTER — E-VISIT (OUTPATIENT)
Dept: PEDIATRICS | Facility: CLINIC | Age: 29
End: 2019-07-27
Payer: COMMERCIAL

## 2019-07-27 DIAGNOSIS — N64.4 BREAST PAIN: Primary | ICD-10-CM

## 2019-07-27 PROCEDURE — 99207 ZZC NO BILLABLE SERVICE THIS VISIT: CPT | Performed by: NURSE PRACTITIONER

## 2019-07-30 ENCOUNTER — OFFICE VISIT (OUTPATIENT)
Dept: PEDIATRICS | Facility: CLINIC | Age: 29
End: 2019-07-30
Payer: COMMERCIAL

## 2019-07-30 VITALS
BODY MASS INDEX: 20.53 KG/M2 | TEMPERATURE: 97.1 F | OXYGEN SATURATION: 100 % | WEIGHT: 123.4 LBS | SYSTOLIC BLOOD PRESSURE: 98 MMHG | DIASTOLIC BLOOD PRESSURE: 60 MMHG | HEART RATE: 72 BPM

## 2019-07-30 DIAGNOSIS — N61.0 ACUTE MASTITIS OF LEFT BREAST: ICD-10-CM

## 2019-07-30 DIAGNOSIS — R42 DIZZINESS: ICD-10-CM

## 2019-07-30 DIAGNOSIS — M54.42 BILATERAL LOW BACK PAIN WITH LEFT-SIDED SCIATICA, UNSPECIFIED CHRONICITY: ICD-10-CM

## 2019-07-30 DIAGNOSIS — R53.83 FATIGUE, UNSPECIFIED TYPE: ICD-10-CM

## 2019-07-30 DIAGNOSIS — R50.9 FEVER, UNSPECIFIED FEVER CAUSE: Primary | ICD-10-CM

## 2019-07-30 DIAGNOSIS — R68.83 CHILLS: ICD-10-CM

## 2019-07-30 LAB
ALBUMIN SERPL-MCNC: 4.3 G/DL (ref 3.4–5)
ALP SERPL-CCNC: 74 U/L (ref 40–150)
ALT SERPL W P-5'-P-CCNC: 23 U/L (ref 0–50)
ANION GAP SERPL CALCULATED.3IONS-SCNC: 6 MMOL/L (ref 3–14)
AST SERPL W P-5'-P-CCNC: 18 U/L (ref 0–45)
BASOPHILS # BLD AUTO: 0 10E9/L (ref 0–0.2)
BASOPHILS NFR BLD AUTO: 0.6 %
BILIRUB SERPL-MCNC: 0.4 MG/DL (ref 0.2–1.3)
BUN SERPL-MCNC: 17 MG/DL (ref 7–30)
CALCIUM SERPL-MCNC: 9.3 MG/DL (ref 8.5–10.1)
CHLORIDE SERPL-SCNC: 107 MMOL/L (ref 94–109)
CO2 SERPL-SCNC: 28 MMOL/L (ref 20–32)
CREAT SERPL-MCNC: 0.8 MG/DL (ref 0.52–1.04)
DIFFERENTIAL METHOD BLD: NORMAL
EOSINOPHIL # BLD AUTO: 0.2 10E9/L (ref 0–0.7)
EOSINOPHIL NFR BLD AUTO: 3.3 %
ERYTHROCYTE [DISTWIDTH] IN BLOOD BY AUTOMATED COUNT: 12.3 % (ref 10–15)
GFR SERPL CREATININE-BSD FRML MDRD: >90 ML/MIN/{1.73_M2}
GLUCOSE SERPL-MCNC: 84 MG/DL (ref 70–99)
HCT VFR BLD AUTO: 39.5 % (ref 35–47)
HGB BLD-MCNC: 13.3 G/DL (ref 11.7–15.7)
IMM GRANULOCYTES # BLD: 0 10E9/L (ref 0–0.4)
IMM GRANULOCYTES NFR BLD: 0.6 %
LYMPHOCYTES # BLD AUTO: 1.9 10E9/L (ref 0.8–5.3)
LYMPHOCYTES NFR BLD AUTO: 29 %
MCH RBC QN AUTO: 31 PG (ref 26.5–33)
MCHC RBC AUTO-ENTMCNC: 33.7 G/DL (ref 31.5–36.5)
MCV RBC AUTO: 92 FL (ref 78–100)
MONOCYTES # BLD AUTO: 0.5 10E9/L (ref 0–1.3)
MONOCYTES NFR BLD AUTO: 7.9 %
NEUTROPHILS # BLD AUTO: 3.9 10E9/L (ref 1.6–8.3)
NEUTROPHILS NFR BLD AUTO: 58.6 %
PLATELET # BLD AUTO: 293 10E9/L (ref 150–450)
POTASSIUM SERPL-SCNC: 3.7 MMOL/L (ref 3.4–5.3)
PROT SERPL-MCNC: 8 G/DL (ref 6.8–8.8)
RBC # BLD AUTO: 4.29 10E12/L (ref 3.8–5.2)
SODIUM SERPL-SCNC: 141 MMOL/L (ref 133–144)
TSH SERPL DL<=0.005 MIU/L-ACNC: 0.88 MU/L (ref 0.4–4)
WBC # BLD AUTO: 6.6 10E9/L (ref 4–11)

## 2019-07-30 PROCEDURE — 85025 COMPLETE CBC W/AUTO DIFF WBC: CPT | Performed by: NURSE PRACTITIONER

## 2019-07-30 PROCEDURE — 84443 ASSAY THYROID STIM HORMONE: CPT | Performed by: NURSE PRACTITIONER

## 2019-07-30 PROCEDURE — 36415 COLL VENOUS BLD VENIPUNCTURE: CPT | Performed by: NURSE PRACTITIONER

## 2019-07-30 PROCEDURE — 82306 VITAMIN D 25 HYDROXY: CPT | Performed by: NURSE PRACTITIONER

## 2019-07-30 PROCEDURE — 80053 COMPREHEN METABOLIC PANEL: CPT | Performed by: NURSE PRACTITIONER

## 2019-07-30 PROCEDURE — 99214 OFFICE O/P EST MOD 30 MIN: CPT | Performed by: NURSE PRACTITIONER

## 2019-07-30 RX ORDER — CEPHALEXIN 500 MG/1
500 CAPSULE ORAL 3 TIMES DAILY
Qty: 21 CAPSULE | Refills: 0 | Status: SHIPPED | OUTPATIENT
Start: 2019-07-30 | End: 2019-09-25

## 2019-07-30 NOTE — RESULT ENCOUNTER NOTE
Kasia Doherty,    Attached are your test results.  -All of your labs are normal.  Waiting on Vitamin D level    Please contact us if you have any questions.    Eneida Aviles, CNP

## 2019-07-30 NOTE — PROGRESS NOTES
Subjective     Xiomara Doherty is a 28 year old female who presents to clinic today for the following health issues:    HPI   Nausea, dizziness      Duration: couple weeks    Description (location/character/radiation): c/o nausea, joint pain, dizziness, on and off fever, chills, swears    Intensity:  moderate    Accompanying signs and symptoms: unsure if could be mastitis    History (similar episodes/previous evaluation): None    Precipitating or alleviating factors: worse when laying down or when stands will get head rush    Therapies tried and outcome: None     No breast pain  Did have a fever in the last 2 days ago about 102  Is breast feeding   Never hurt to breast feed but did hurt to touch     Patient Active Problem List   Diagnosis     Menorrhagia     Back pain     Urinary frequency     Adjustment disorder with anxiety     Generalized anxiety disorder     Psoriasis     Esophageal reflux     New daily persistent headache     Post-concussion headache     Supervision of other normal pregnancy, antepartum     Need for rhogam due to Rh negative mother     Owen esophagus     Numbness and tingling     Past Surgical History:   Procedure Laterality Date     ARTHROSCOPY KNEE  7/26/2011    Procedure:ARTHROSCOPY KNEE; diagnostic; Surgeon:VIOLETTA JIM; Location:[facility]     ARTHROSCOPY KNEE RT/LT  10/20/09    right knee     ARTHROSCOPY KNEE WITH RETINACULAR RELEASE  7/26/2011    Procedure:ARTHROSCOPY KNEE WITH RETINACULAR RELEASE; lateral release; Surgeon:VIOLETTA JIM; Location:[facility]     BIOPSY       COLONOSCOPY  06/21/12    [facility]     COLONOSCOPY WITH CO2 INSUFFLATION N/A 10/29/2014    Procedure: COLONOSCOPY WITH CO2 INSUFFLATION;  Surgeon: Duane, William Charles, MD;  Location: [facility]     COMBINED ESOPHAGOSCOPY, GASTROSCOPY, DUODENOSCOPY (EGD) WITH CO2 INSUFFLATION N/A 4/24/2019    Procedure: ESOPHAGOGASTRODUODENOSCOPY, WITH CO2 INSUFFLATION;  Surgeon: Duane, William Charles, MD;  Location: [facility]      ESOPHAGOSCOPY, GASTROSCOPY, DUODENOSCOPY (EGD), COMBINED  2/3/2011    COMBINED ESOPHAGOSCOPY, GASTROSCOPY, DUODENOSCOPY (EGD), BIOPSY SINGLE OR MULTIPLE performed by [facility]DEZ at  [facility]     ESOPHAGOSCOPY, GASTROSCOPY, DUODENOSCOPY (EGD), COMBINED N/A 10/29/2014    Procedure: COMBINED ESOPHAGOSCOPY, GASTROSCOPY, DUODENOSCOPY (EGD), BIOPSY SINGLE OR MULTIPLE;  Surgeon: Duane, William Charles, MD;  Location: [facility]     HC REMOVE TONSILS/ADENOIDS,<11 Y/O  1997      UGI ENDOSCOPY, SIMPLE EXAM  06/21/12    [facility]     IRRIGATION AND DEBRIDEMENT KNEE, COMBINED  7/26/2011    Procedure:COMBINED IRRIGATION AND DEBRIDEMENT KNEE; dedridement; Surgeon:VIOLETTA JIM; Location:[facility]     SOFT TISSUE SURGERY         Social History     Tobacco Use     Smoking status: Never Smoker     Smokeless tobacco: Never Used   Substance Use Topics     Alcohol use: No     Frequency: Never     Family History   Problem Relation Age of Onset     Cancer Father         skin cancer on face     Hypertension Father      Hypertension Paternal Grandfather      Prostate Cancer Maternal Grandfather      Asthma Sister         sports         Current Outpatient Medications   Medication Sig Dispense Refill     ranitidine (ZANTAC) 150 MG tablet Take 1 tablet (150 mg) by mouth 2 times daily 180 tablet 0     sertraline (ZOLOFT) 100 MG tablet Take 1 tablet (100 mg) by mouth daily Half tablet a day for 4 days then a whole 90 tablet 1     sucralfate (CARAFATE) 1 GM/10ML suspension Take 10 mLs (1 g) by mouth 4 times daily (Patient not taking: Reported on 7/30/2019) 420 mL 3     Allergies   Allergen Reactions     Nuts Shortness Of Breath     Pineapple Shortness Of Breath and Anaphylaxis     Pt. Allergic to pineapple     Fentanyl Itching     Vicodin [Hydrocodone-Acetaminophen] Nausea and Vomiting     BP Readings from Last 3 Encounters:   07/30/19 98/60   04/24/19 98/65   04/23/19 100/60    Wt Readings from Last 3 Encounters:   07/30/19 56 kg (123 lb 6.4 oz)    04/23/19 61.6 kg (135 lb 14.4 oz)   04/09/19 61.4 kg (135 lb 6.4 oz)                    Reviewed and updated as needed this visit by Provider         Review of Systems   ROS COMP: CONSTITUTIONAL:POSITIVE  for chills, fatigue and fever  and NEGATIVE  for weight gain and weight loss  INTEGUMENTARY/SKIN: NEGATIVE for rash   ENT/MOUTH: NEGATIVE for ear, mouth and throat problems  RESP:NEGATIVE for significant cough or SOB  CV: NEGATIVE for chest pain, palpitations or peripheral edema  GI: POSITIVE for nausea and NEGATIVE for vomiting  MUSCULOSKELETAL: POSITIVE  for myalgia and had issue with her back in the past.   NEGATIVE for joint swelling  and joint warmth   NEURO: POSITIVE for dizziness/lightheadedness and NEGATIVE for HX seizure D/O, involuntary movements, gait disturbance and syncope  ENDOCRINE: NEGATIVE for temperature intolerance, skin/hair changes  HEME/ALLERGY/IMMUNE: NEGATIVE for bleeding problems      Objective    BP 98/60   Pulse 72   Temp 97.1  F (36.2  C)   Wt 56 kg (123 lb 6.4 oz)   LMP  (LMP Unknown)   SpO2 100%   Breastfeeding? Yes   BMI 20.53 kg/m    Body mass index is 20.53 kg/m .  Physical Exam   GENERAL APPEARANCE: alert, active and no distress  NECK: no adenopathy  RESP: lungs clear to auscultation - no rales, rhonchi or wheezes  BREAST: no palpable axillary masses or adenopathy and tenderness left very mild   CV: regular rates and rhythm and no murmur, click or rub  ABDOMEN: soft, non-tender  MS: extremities normal- no gross deformities noted  Lumbosacral spine area reveals no local tenderness or mass.  Full and painless lumbosacral range of motion is noted.  SKIN: no suspicious lesions or rashes  NEURO: Normal strength and tone, mentation intact and speech normal  PSYCH: mentation appears normal and affect normal/bright    Diagnostic Test Results:  Results for orders placed or performed in visit on 07/30/19   CBC with platelets differential   Result Value Ref Range    WBC 6.6 4.0 -  11.0 10e9/L    RBC Count 4.29 3.8 - 5.2 10e12/L    Hemoglobin 13.3 11.7 - 15.7 g/dL    Hematocrit 39.5 35.0 - 47.0 %    MCV 92 78 - 100 fl    MCH 31.0 26.5 - 33.0 pg    MCHC 33.7 31.5 - 36.5 g/dL    RDW 12.3 10.0 - 15.0 %    Platelet Count 293 150 - 450 10e9/L    Diff Method Automated Method     % Neutrophils 58.6 %    % Lymphocytes 29.0 %    % Monocytes 7.9 %    % Eosinophils 3.3 %    % Basophils 0.6 %    % Immature Granulocytes 0.6 %    Absolute Neutrophil 3.9 1.6 - 8.3 10e9/L    Absolute Lymphocytes 1.9 0.8 - 5.3 10e9/L    Absolute Monocytes 0.5 0.0 - 1.3 10e9/L    Absolute Eosinophils 0.2 0.0 - 0.7 10e9/L    Absolute Basophils 0.0 0.0 - 0.2 10e9/L    Abs Immature Granulocytes 0.0 0 - 0.4 10e9/L   TSH with free T4 reflex   Result Value Ref Range    TSH 0.88 0.40 - 4.00 mU/L   Vitamin D Deficiency   Result Value Ref Range    Vitamin D Deficiency screening 56 20 - 75 ug/L   Comprehensive metabolic panel   Result Value Ref Range    Sodium 141 133 - 144 mmol/L    Potassium 3.7 3.4 - 5.3 mmol/L    Chloride 107 94 - 109 mmol/L    Carbon Dioxide 28 20 - 32 mmol/L    Anion Gap 6 3 - 14 mmol/L    Glucose 84 70 - 99 mg/dL    Urea Nitrogen 17 7 - 30 mg/dL    Creatinine 0.80 0.52 - 1.04 mg/dL    GFR Estimate >90 >60 mL/min/[1.73_m2]    GFR Estimate If Black >90 >60 mL/min/[1.73_m2]    Calcium 9.3 8.5 - 10.1 mg/dL    Bilirubin Total 0.4 0.2 - 1.3 mg/dL    Albumin 4.3 3.4 - 5.0 g/dL    Protein Total 8.0 6.8 - 8.8 g/dL    Alkaline Phosphatase 74 40 - 150 U/L    ALT 23 0 - 50 U/L    AST 18 0 - 45 U/L           Assessment & Plan     Xiomara was seen today for consult.    Diagnoses and all orders for this visit:    Fever, unspecified fever cause  -     CBC with platelets differential    Chills  -     CBC with platelets differential    Dizziness  -     Vitamin D Deficiency  -     Comprehensive metabolic panel  -     JUST IN CASE    Fatigue, unspecified type  -     CBC with platelets differential  -     TSH with free T4 reflex  -      Vitamin D Deficiency  -     Comprehensive metabolic panel    Acute mastitis of left breast  -     cephALEXin (KEFLEX) 500 MG capsule; Take 1 capsule (500 mg) by mouth 3 times daily for 7 days    Bilateral low back pain with left-sided sciatica, unspecified chronicity  -     PHYSICAL THERAPY REFERRAL; Future        See Patient Instructions  Patient Instructions     PLAN:   1.   Symptomatic therapy suggested: rest, increase fluids, OTC acetaminophen, ibuprofen and call prn if symptoms persist or worsen.  2.  Orders Placed This Encounter   Medications     cephALEXin (KEFLEX) 500 MG capsule     Sig: Take 1 capsule (500 mg) by mouth 3 times daily for 7 days     Dispense:  21 capsule     Refill:  0     Orders Placed This Encounter   Procedures     CBC with platelets differential     TSH with free T4 reflex     Vitamin D Deficiency     Comprehensive metabolic panel     JUST IN CASE     PHYSICAL THERAPY REFERRAL       3. Patient needs to follow up in if no improvement,or sooner if worsening of symptoms or other symptoms develop.  Make follow up with [facility]   Will follow up and/or notify patient of  results via My Chart to determine further need for followup    No follow-ups on file.    Eneida Aviles, APRN CNP  [facility]

## 2019-07-30 NOTE — PATIENT INSTRUCTIONS
PLAN:   1.   Symptomatic therapy suggested: rest, increase fluids, OTC acetaminophen, ibuprofen and call prn if symptoms persist or worsen.  2.  Orders Placed This Encounter   Medications     cephALEXin (KEFLEX) 500 MG capsule     Sig: Take 1 capsule (500 mg) by mouth 3 times daily for 7 days     Dispense:  21 capsule     Refill:  0     Orders Placed This Encounter   Procedures     CBC with platelets differential     TSH with free T4 reflex     Vitamin D Deficiency     Comprehensive metabolic panel     JUST IN CASE     PHYSICAL THERAPY REFERRAL       3. Patient needs to follow up in if no improvement,or sooner if worsening of symptoms or other symptoms develop.  Make follow up with [facility]   Will follow up and/or notify patient of  results via My Chart to determine further need for followup

## 2019-07-31 LAB — DEPRECATED CALCIDIOL+CALCIFEROL SERPL-MC: 56 UG/L (ref 20–75)

## 2019-07-31 NOTE — RESULT ENCOUNTER NOTE
Kasia Doherty,    Attached are your test results.  -Vitamin D level is normal and getting 1000 IU daily in your diet or supplements is recommended.    Please contact us if you have any questions.    Eneida Aviles, CNP

## 2019-09-25 ENCOUNTER — OFFICE VISIT (OUTPATIENT)
Dept: PEDIATRICS | Facility: CLINIC | Age: 29
End: 2019-09-25
Payer: COMMERCIAL

## 2019-09-25 ENCOUNTER — TELEPHONE (OUTPATIENT)
Dept: PEDIATRICS | Facility: CLINIC | Age: 29
End: 2019-09-25

## 2019-09-25 VITALS
WEIGHT: 126.7 LBS | OXYGEN SATURATION: 97 % | TEMPERATURE: 99 F | HEART RATE: 99 BPM | SYSTOLIC BLOOD PRESSURE: 114 MMHG | DIASTOLIC BLOOD PRESSURE: 50 MMHG | BODY MASS INDEX: 21.08 KG/M2

## 2019-09-25 DIAGNOSIS — N61.0 ACUTE MASTITIS OF LEFT BREAST: ICD-10-CM

## 2019-09-25 PROCEDURE — 99213 OFFICE O/P EST LOW 20 MIN: CPT | Performed by: INTERNAL MEDICINE

## 2019-09-25 RX ORDER — CEPHALEXIN 500 MG/1
500 CAPSULE ORAL 3 TIMES DAILY
Qty: 21 CAPSULE | Refills: 0 | Status: SHIPPED | OUTPATIENT
Start: 2019-09-25 | End: 2020-08-04

## 2019-09-25 NOTE — PROGRESS NOTES
Subjective     Xiomara Doherty is a 28 year old female who presents to clinic today for the following health issues:    HPI   Concern - Mastitis   Onset: yesterday 9/24    Description:   Second time that has happened. Body aches, fever, hurts when nursing     Intensity: mild    Progression of Symptoms:  worsening    Accompanying Signs & Symptoms:  Sweats and chills    Precipitating factors:   Worsened by: nursing    Alleviating factors:  Improved by: nothing    Therapies Tried and outcome: tylenol and ibuprofen    This is the third time patient has had this. First time with her first pregnancy. The second time about 1 1/2 months ago.     ROS:  Constitutional, HEENT, cardiovascular, pulmonary, gi and gu systems are negative, except as otherwise noted.       ranitidine (ZANTAC) 150 MG tablet, Take 1 tablet (150 mg) by mouth 2 times daily (Patient not taking: Reported on 9/25/2019)  sertraline (ZOLOFT) 100 MG tablet, Take 1 tablet (100 mg) by mouth daily Half tablet a day for 4 days then a whole (Patient not taking: Reported on 9/25/2019)  sucralfate (CARAFATE) 1 GM/10ML suspension, Take 10 mLs (1 g) by mouth 4 times daily (Patient not taking: Reported on 7/30/2019)    No current facility-administered medications on file prior to visit.          Patient Active Problem List   Diagnosis     Menorrhagia     Back pain     Urinary frequency     Adjustment disorder with anxiety     Generalized anxiety disorder     Psoriasis     Esophageal reflux     New daily persistent headache     Post-concussion headache     Supervision of other normal pregnancy, antepartum     Need for rhogam due to Rh negative mother     Owen esophagus     Numbness and tingling     Past Surgical History:   Procedure Laterality Date     ARTHROSCOPY KNEE  7/26/2011    Procedure:ARTHROSCOPY KNEE; diagnostic; Surgeon:VIOLETTA JIM; Location:[facility]     ARTHROSCOPY KNEE RT/LT  10/20/09    right knee     ARTHROSCOPY KNEE WITH RETINACULAR RELEASE   7/26/2011    Procedure:ARTHROSCOPY KNEE WITH RETINACULAR RELEASE; lateral release; Surgeon:VIOLETTA JIM; Location:[facility]     BIOPSY       COLONOSCOPY  06/21/12    [facility]     COLONOSCOPY WITH CO2 INSUFFLATION N/A 10/29/2014    Procedure: COLONOSCOPY WITH CO2 INSUFFLATION;  Surgeon: Duane, William Charles, MD;  Location: [facility]     COMBINED ESOPHAGOSCOPY, GASTROSCOPY, DUODENOSCOPY (EGD) WITH CO2 INSUFFLATION N/A 4/24/2019    Procedure: ESOPHAGOGASTRODUODENOSCOPY, WITH CO2 INSUFFLATION;  Surgeon: Duane, William Charles, MD;  Location: [facility]     ESOPHAGOSCOPY, GASTROSCOPY, DUODENOSCOPY (EGD), COMBINED  2/3/2011    COMBINED ESOPHAGOSCOPY, GASTROSCOPY, DUODENOSCOPY (EGD), BIOPSY SINGLE OR MULTIPLE performed by DINAH VALDEZ at [facility]     ESOPHAGOSCOPY, GASTROSCOPY, DUODENOSCOPY (EGD), COMBINED N/A 10/29/2014    Procedure: COMBINED ESOPHAGOSCOPY, GASTROSCOPY, DUODENOSCOPY (EGD), BIOPSY SINGLE OR MULTIPLE;  Surgeon: Duane, William Charles, MD;  Location: [facility]     HC REMOVE TONSILS/ADENOIDS,<11 Y/O  1997      [facility] ENDOSCOPY, SIMPLE EXAM  06/21/12    [facility]     IRRIGATION AND DEBRIDEMENT KNEE, COMBINED  7/26/2011    Procedure:COMBINED IRRIGATION AND DEBRIDEMENT KNEE; dedridement; Surgeon:VIOLETTA JIM; Location:[facility]     SOFT TISSUE SURGERY         Social History     Tobacco Use     Smoking status: Never Smoker     Smokeless tobacco: Never Used   Substance Use Topics     Alcohol use: No     Frequency: Never     Family History   Problem Relation Age of Onset     Cancer Father         skin cancer on face     Hypertension Father      Hypertension Paternal Grandfather      Prostate Cancer Maternal Grandfather      Asthma Sister         sports             Problem list, Medication list, Allergies, and Medical/Social/Surgical histories reviewed in [facility] and updated as appropriate.    OBJECTIVE:                                                    /50 (BP Location: Right arm, Patient Position: Chair, Cuff Size:  Adult Regular)   Pulse 99   Temp 99  F (37.2  C) (Temporal)   Wt 57.5 kg (126 lb 11.2 oz)   SpO2 97%   BMI 21.08 kg/m      GENERAL: healthy, alert and no distress  Left breast: mild erythema in the lateral lower outer quadrant with a lumpy area. Positive for warmth and tenderness.       Diagnostic test results:        ASSESSMENT/PLAN:                                                      28 year old female with the following diagnoses and treatment plan:      ICD-10-CM    1. Acute mastitis of left breast N61.0 cephALEXin (KEFLEX) 500 MG capsule         Will call or return to clinic if worsening or symptoms not improving as discussed.  See Patient Instructions.      Barbara Foley MD-PhD  [facility]    (Note: Chart documentation was done in part with Dragon Voice Recognition software. Although reviewed after completion, some word and grammatical errors may remain.)

## 2019-09-25 NOTE — TELEPHONE ENCOUNTER
[facility]    Phone Message    May a detailed message be left on voicemail: yes    Reason for Call: Other: Pt will be in [facility] at 11:50am today with son who sees Dr Shin.  Has mastitis and asking for a script.       Pt knows ALMAS Aviles is out today. Asking for someone else to send in script.        [id number] [employer] - [place], [place] - [address]      Action Taken: Message routed to:  Primary Care p [phone]

## 2019-09-25 NOTE — TELEPHONE ENCOUNTER
My preference would be that she be seen by one for us for a  PE, diagnosis and management. I do not feel comfortable prescribing an antibiotic at this time.

## 2019-09-25 NOTE — TELEPHONE ENCOUNTER
Called patient and left a detailed msg that she needs to be seen. Also notified Dr. Shin's MA.    Bita Kelly, RN, BSN, PHN  [facility]

## 2019-09-25 NOTE — PATIENT INSTRUCTIONS
Medication(s) prescribed today:    Orders Placed This Encounter   Medications     cephALEXin (KEFLEX) 500 MG capsule     Sig: Take 1 capsule (500 mg) by mouth 3 times daily     Dispense:  21 capsule     Refill:  0

## 2019-09-28 ENCOUNTER — HEALTH MAINTENANCE LETTER (OUTPATIENT)
Age: 29
End: 2019-09-28

## 2020-01-06 ENCOUNTER — OFFICE VISIT (OUTPATIENT)
Dept: PEDIATRICS | Facility: CLINIC | Age: 30
End: 2020-01-06
Payer: COMMERCIAL

## 2020-01-06 ENCOUNTER — ANCILLARY PROCEDURE (OUTPATIENT)
Dept: GENERAL RADIOLOGY | Facility: CLINIC | Age: 30
End: 2020-01-06
Attending: NURSE PRACTITIONER
Payer: COMMERCIAL

## 2020-01-06 VITALS
HEIGHT: 66 IN | BODY MASS INDEX: 21.73 KG/M2 | SYSTOLIC BLOOD PRESSURE: 100 MMHG | HEART RATE: 110 BPM | WEIGHT: 135.2 LBS | TEMPERATURE: 98.9 F | OXYGEN SATURATION: 98 % | DIASTOLIC BLOOD PRESSURE: 60 MMHG

## 2020-01-06 DIAGNOSIS — M79.641 BILATERAL HAND PAIN: ICD-10-CM

## 2020-01-06 DIAGNOSIS — R20.0 NUMBNESS AND TINGLING IN BOTH HANDS: ICD-10-CM

## 2020-01-06 DIAGNOSIS — R20.2 NUMBNESS AND TINGLING IN BOTH HANDS: ICD-10-CM

## 2020-01-06 DIAGNOSIS — J20.9 BRONCHITIS WITH BRONCHOSPASM: Primary | ICD-10-CM

## 2020-01-06 DIAGNOSIS — M79.642 BILATERAL HAND PAIN: ICD-10-CM

## 2020-01-06 LAB
CRP SERPL-MCNC: 6.6 MG/L (ref 0–8)
ERYTHROCYTE [SEDIMENTATION RATE] IN BLOOD BY WESTERGREN METHOD: 9 MM/H (ref 0–20)

## 2020-01-06 PROCEDURE — 99214 OFFICE O/P EST MOD 30 MIN: CPT | Performed by: NURSE PRACTITIONER

## 2020-01-06 PROCEDURE — 86140 C-REACTIVE PROTEIN: CPT | Performed by: NURSE PRACTITIONER

## 2020-01-06 PROCEDURE — 86039 ANTINUCLEAR ANTIBODIES (ANA): CPT | Performed by: NURSE PRACTITIONER

## 2020-01-06 PROCEDURE — 73130 X-RAY EXAM OF HAND: CPT | Mod: LT | Performed by: RADIOLOGY

## 2020-01-06 PROCEDURE — 86225 DNA ANTIBODY NATIVE: CPT | Performed by: NURSE PRACTITIONER

## 2020-01-06 PROCEDURE — 86200 CCP ANTIBODY: CPT | Performed by: NURSE PRACTITIONER

## 2020-01-06 PROCEDURE — 86431 RHEUMATOID FACTOR QUANT: CPT | Performed by: NURSE PRACTITIONER

## 2020-01-06 PROCEDURE — 36415 COLL VENOUS BLD VENIPUNCTURE: CPT | Performed by: NURSE PRACTITIONER

## 2020-01-06 PROCEDURE — 86038 ANTINUCLEAR ANTIBODIES: CPT | Performed by: NURSE PRACTITIONER

## 2020-01-06 PROCEDURE — 85652 RBC SED RATE AUTOMATED: CPT | Performed by: NURSE PRACTITIONER

## 2020-01-06 RX ORDER — DOXYCYCLINE HYCLATE 100 MG
100 TABLET ORAL 2 TIMES DAILY
Qty: 14 TABLET | Refills: 0 | Status: SHIPPED | OUTPATIENT
Start: 2020-01-06 | End: 2020-08-04

## 2020-01-06 RX ORDER — ALBUTEROL SULFATE 90 UG/1
2 AEROSOL, METERED RESPIRATORY (INHALATION) EVERY 4 HOURS PRN
Qty: 1 INHALER | Refills: 1 | Status: SHIPPED | OUTPATIENT
Start: 2020-01-06 | End: 2020-08-04

## 2020-01-06 RX ORDER — METHYLPREDNISOLONE 4 MG
TABLET, DOSE PACK ORAL
Qty: 21 TABLET | Refills: 0 | Status: SHIPPED | OUTPATIENT
Start: 2020-01-06 | End: 2020-08-04

## 2020-01-06 ASSESSMENT — MIFFLIN-ST. JEOR: SCORE: 1352.07

## 2020-01-06 NOTE — RESULT ENCOUNTER NOTE
Kasia Doherty,    Attached are your test results.  Normal hand xrays    Please contact us if you have any questions.    Eneida Aviles, CNP

## 2020-01-06 NOTE — PROGRESS NOTES
Subjective     Xiomara Doherty is a 29 year old female who presents to clinic today for the following health issues:    HPI   Acute Illness   Acute illness concerns: coughing  Onset:  2 weeks     Fever: no    Chills/Sweats: no    Headache (location?): YES- head pain when she is coughing.    Sinus Pressure:no    Conjunctivitis:  no    Ear Pain: no    Rhinorrhea: no    Congestion: no    Sore Throat: YES- some irritation from coughing     Cough: YES-non-productive, barking    Wheeze: YES    Decreased Appetite: no    Nausea: no    Vomiting: no    Diarrhea:  no    Dysuria/Freq.: no    Fatigue/Achiness: YES    Sick/Strep Exposure: no     Therapies Tried and outcome: cough drops, teas, lemon, honey, chloseptic spray, gargling salt water, old inhaler   Has already been using inhaler   Started with some mild tickle but now has been almost 2 weeks      tingling Pain    Onset: 2-4 months    Description:   Location: bilateral hands  Character: Burning, tingling, numbness    Intensity: moderate-started off worst at night and now is all day.    Progression of Symptoms: worse    Accompanying Signs & Symptoms:  Other symptoms: numbness, tingling, burning and swelling. Hands will go white and then become really red.     History:   Previous similar pain: no       Precipitating factors:   Trauma or overuse: no     Alleviating factors:  Improved by: heat    Therapies Tried and outcome: heat, tylenol, ibuprofen     Patient Active Problem List   Diagnosis     Menorrhagia     Back pain     Urinary frequency     Adjustment disorder with anxiety     Generalized anxiety disorder     Psoriasis     Esophageal reflux     New daily persistent headache     Post-concussion headache     Supervision of other normal pregnancy, antepartum     Need for rhogam due to Rh negative mother     Owen esophagus     Numbness and tingling     Past Surgical History:   Procedure Laterality Date     ARTHROSCOPY KNEE  7/26/2011    Procedure:ARTHROSCOPY KNEE;  diagnostic; Surgeon:VIOLETTA JIM; Location:[facility]     ARTHROSCOPY KNEE RT/LT  10/20/09    right knee     ARTHROSCOPY KNEE WITH RETINACULAR RELEASE  7/26/2011    Procedure:ARTHROSCOPY KNEE WITH RETINACULAR RELEASE; lateral release; Surgeon:VIOLETTA JIM; Location:[facility]     BIOPSY       COLONOSCOPY  06/21/12    [facility]     COLONOSCOPY WITH CO2 INSUFFLATION N/A 10/29/2014    Procedure: COLONOSCOPY WITH CO2 INSUFFLATION;  Surgeon: Duane, William Charles, MD;  Location: [facility]     COMBINED ESOPHAGOSCOPY, GASTROSCOPY, DUODENOSCOPY (EGD) WITH CO2 INSUFFLATION N/A 4/24/2019    Procedure: ESOPHAGOGASTRODUODENOSCOPY, WITH CO2 INSUFFLATION;  Surgeon: Duane, William Charles, MD;  Location: [facility]     ESOPHAGOSCOPY, GASTROSCOPY, DUODENOSCOPY (EGD), COMBINED  2/3/2011    COMBINED ESOPHAGOSCOPY, GASTROSCOPY, DUODENOSCOPY (EGD), BIOPSY SINGLE OR MULTIPLE performed by DINAH VALDEZ [facility]     ESOPHAGOSCOPY, GASTROSCOPY, DUODENOSCOPY (EGD), COMBINED N/A 10/29/2014    Procedure: COMBINED ESOPHAGOSCOPY, GASTROSCOPY, DUODENOSCOPY (EGD), BIOPSY SINGLE OR MULTIPLE;  Surgeon: Duane, William Charles, MD;  Location: [facility]     HC REMOVE TONSILS/ADENOIDS,<13 Y/O  [address] [place] [address] UGI ENDOSCOPY, SIMPLE EXAM  06/21/12    [facility]     IRRIGATION AND DEBRIDEMENT KNEE, COMBINED  7/26/2011    Procedure:COMBINED IRRIGATION AND DEBRIDEMENT KNEE; dedridement; Surgeon:VIOLETTA JIM; Location:[facility]     SOFT TISSUE SURGERY         Social History     Tobacco Use     Smoking status: Never Smoker     Smokeless tobacco: Never Used   Substance Use Topics     Alcohol use: No     Frequency: Never     Family History   Problem Relation Age of Onset     Cancer Father         skin cancer on face     Hypertension Father      Hypertension Paternal Grandfather      Prostate Cancer Maternal Grandfather      Asthma Sister         sports         Current Outpatient Medications   Medication Sig Dispense Refill     ranitidine (ZANTAC) 150 MG tablet Take 1 tablet  "(150 mg) by mouth 2 times daily 180 tablet 0     cephALEXin (KEFLEX) 500 MG capsule Take 1 capsule (500 mg) by mouth 3 times daily 21 capsule 0     sertraline (ZOLOFT) 100 MG tablet Take 1 tablet (100 mg) by mouth daily Half tablet a day for 4 days then a whole (Patient not taking: Reported on 9/25/2019) 90 tablet 1     sucralfate (CARAFATE) 1 GM/10ML suspension Take 10 mLs (1 g) by mouth 4 times daily (Patient not taking: Reported on 7/30/2019) 420 mL 3     Allergies   Allergen Reactions     Nuts Shortness Of Breath     Pineapple Shortness Of Breath and Anaphylaxis     Pt. Allergic to pineapple     Fentanyl Itching     Vicodin [Hydrocodone-Acetaminophen] Nausea and Vomiting     BP Readings from Last 3 Encounters:   01/06/20 100/60   09/25/19 114/50   07/30/19 98/60    Wt Readings from Last 3 Encounters:   01/06/20 61.3 kg (135 lb 3.2 oz)   09/25/19 57.5 kg (126 lb 11.2 oz)   07/30/19 56 kg (123 lb 6.4 oz)           Reviewed and updated as needed this visit by Provider         Review of Systems   ROS COMP: CONSTITUTIONAL:NEGATIVE for fever, chills, change in weight  ENT/MOUTH: NEGATIVE for ear, mouth and throat problems  RESP:POSITIVE for cough-non productive and NEGATIVE for SOB/dyspnea and wheezing  CV: NEGATIVE for chest pain/chest pressure  GI: NEGATIVE for nausea and vomiting  MUSCULOSKELETAL: POSITIVE  for joint pain bilateral hands  and NEGATIVE for joint warmth   NEURO: POSITIVE for bilateral hand tingling and swelling. Is taking ibuprofen and having pain in both hands.  and NEGATIVE for involuntary movements, gait disturbance and syncope  ENDOCRINE: NEGATIVE for temperature intolerance, skin/hair changes  HEME/ALLERGY/IMMUNE: NEGATIVE for bleeding problems      Objective    /60 (BP Location: Right arm, Patient Position: Sitting, Cuff Size: Adult Regular)   Pulse 110   Temp 98.9  F (37.2  C) (Temporal)   Ht 1.664 m (5' 5.5\")   Wt 61.3 kg (135 lb 3.2 oz)   SpO2 98%   Breastfeeding Yes   BMI 22.16 " kg/m    Body mass index is 22.16 kg/m .  Physical Exam   GENERAL: Patient is well nourished, well developed,in no apparent distress, non-toxic, in no respiratory distress and acyanotic, alert, cooperative and well hydrated  alert, active, comfortable, in no acute distress  EYES:  Right conjunctiva is not injected and without discharge.  Left conjunctiva is not injected and without discharge.  EARS: negative findings: external ears normal to inspection and palpation, TM's clear, no mastoid process tenderness  NOSE: negative findings: nasal mucosa normal,  Sinus not tender.  THROAT: normal.  NECK: supple with no adenopathy  CARDIAC:NORMAL - regular rate and rhythm without murmur.  RESP: normal respiratory rate and rhythm, lungs clear to auscultation  barky cough  ABD: Abdomen soft, non-tender.  SKIN: Skin color, texture, turgor normal. No rashes or lesions.  MS: extremities normal- no gross deformities noted, gait normal and normal muscle tone  Hand exam - both sides normal, full range of motion of all joints, no swelling, tenderness or deformities. There is normal motor, sensory, vascular and tendon function.      Diagnostic Test Results:  Labs reviewed in Epic  Results for orders placed or performed in visit on 01/06/20   XR Hand Bilateral G/E 3 Views     Status: None    Narrative    EXAMINATION: XR HAND BILATERAL G/E 3 VW  1/6/2020 3:18 PM     CLINICAL HISTORY:  Numbness and tingling in both hands; Numbness and  tingling in both hands; Bilateral hand pain; Bilateral hand pain     COMPARISON: None available    FINDINGS: AP, oblique and lateral views of bilateral hands were  obtained. There is no comparison available.    Bilaterally, the joint spaces are preserved. There is no evidence of  acute osseous abnormalities, no erosive changes are seen.      Impression    IMPRESSION: Bilaterally, normal appearing radiographs of the hands.    JUTTA ELLERMANN, MD   Results for orders placed or performed in visit on 01/06/20    Anti Nuclear Josselin IgG by IFA with Reflex     Status: Abnormal   Result Value Ref Range    VINCENZO interpretation Borderline Positive (A) NEG^Negative    VINCENZO pattern 1 SPECKLED     VINCENZO titer 1 1:40    Rheumatoid factor     Status: Abnormal   Result Value Ref Range    Rheumatoid Factor 21 (H) <12 IU/mL   Erythrocyte sedimentation rate auto     Status: None   Result Value Ref Range    Sed Rate 9 0 - 20 mm/h   CRP inflammation     Status: None   Result Value Ref Range    CRP Inflammation 6.6 0.0 - 8.0 mg/L   Cyclic Citrullinated Peptide Antibody IgG     Status: None   Result Value Ref Range    Cyclic Citrullinated Peptide Antibody, IgG 1 <7 U/mL           Assessment & Plan     Xiomara was seen today for cough and tingling.    Diagnoses and all orders for this visit:    Bronchitis with bronchospasm  -     albuterol (PROAIR HFA/PROVENTIL HFA/VENTOLIN HFA) 108 (90 Base) MCG/ACT inhaler; Inhale 2 puffs into the lungs every 4 hours as needed for shortness of breath / dyspnea or wheezing  -     doxycycline hyclate (VIBRA-TABS) 100 MG tablet; Take 1 tablet (100 mg) by mouth 2 times daily  -     methylPREDNISolone (MEDROL DOSEPAK) 4 MG tablet therapy pack; Follow package instructions  Discussed the nature of bronchospasm and treatment options.      Numbness and tingling in both hands  -     EMG; Future  -     XR Hand Bilateral G/E 3 Views; Future  -     Anti Nuclear Josselin IgG by IFA with Reflex  -     Rheumatoid factor  -     Erythrocyte sedimentation rate auto  -     CRP inflammation  -     Cyclic Citrullinated Peptide Antibody IgG  Will follow up and/or notify patient of  results via My Chart to determine further need for followup  Symptomatic therapy suggested: OTC acetaminophen, aleve and call prn if symptoms persist or worsen.    Bilateral hand pain  -     XR Hand Bilateral G/E 3 Views; Future  -     Anti Nuclear Josselin IgG by IFA with Reflex  -     Rheumatoid factor  -     Erythrocyte sedimentation rate auto  -     CRP  inflammation  -     Cyclic Citrullinated Peptide Antibody IgG  Will follow up and/or notify patient of  results via My Chart to determine further need for followup      See Patient Instructions  Patient Instructions     PLAN:   1.   Symptomatic therapy suggested: rest, increase fluids, use mist of vaporizer prn, OTC Robitussin DM and call prn if symptoms persist or worsen.  2.  Orders Placed This Encounter   Medications     albuterol (PROAIR HFA/PROVENTIL HFA/VENTOLIN HFA) 108 (90 Base) MCG/ACT inhaler     Sig: Inhale 2 puffs into the lungs every 4 hours as needed for shortness of breath / dyspnea or wheezing     Dispense:  1 Inhaler     Refill:  1     Pharmacy may dispense brand covered by insurance (Proair, or proventil or ventolin or generic albuterol inhaler)     doxycycline hyclate (VIBRA-TABS) 100 MG tablet     Sig: Take 1 tablet (100 mg) by mouth 2 times daily     Dispense:  14 tablet     Refill:  0     methylPREDNISolone (MEDROL DOSEPAK) 4 MG tablet therapy pack     Sig: Follow package instructions     Dispense:  21 tablet     Refill:  0     Orders Placed This Encounter   Procedures     XR Hand Bilateral G/E 3 Views     Anti Nuclear Josselin IgG by IFA with Reflex     Rheumatoid factor     Erythrocyte sedimentation rate auto     CRP inflammation     Cyclic Citrullinated Peptide Antibody IgG     EMG       3. Patient needs to follow up in if no improvement,or sooner if worsening of symptoms or other symptoms develop.  FURTHER TESTING:       - hand xrays  Nerve conduction studies  I will place order. Please call [phone] to schedule.  Will follow up and/or notify patient of  results via My Chart to determine further need for followup      No follow-ups on file.    KEL Goldstein [facility]

## 2020-01-06 NOTE — NURSING NOTE
"Chief Complaint   Patient presents with     Cough     coughing x 4 days, head pain when she is coughing     Tingling     2 months ago tingling in the pinky, now has some tingling in bilateral hands        Initial /60 (BP Location: Right arm, Patient Position: Sitting, Cuff Size: Adult Regular)   Pulse 110   Temp 98.9  F (37.2  C) (Temporal)   Ht 1.664 m (5' 5.5\")   Wt 61.3 kg (135 lb 3.2 oz)   SpO2 98%   Breastfeeding Yes   BMI 22.16 kg/m   Estimated body mass index is 22.16 kg/m  as calculated from the following:    Height as of this encounter: 1.664 m (5' 5.5\").    Weight as of this encounter: 61.3 kg (135 lb 3.2 oz).  Medication Reconciliation: complete      HARVEY Stewart      "

## 2020-01-06 NOTE — PATIENT INSTRUCTIONS
PLAN:   1.   Symptomatic therapy suggested: rest, increase fluids, use mist of vaporizer prn, OTC Robitussin DM and call prn if symptoms persist or worsen.  2.  Orders Placed This Encounter   Medications     albuterol (PROAIR HFA/PROVENTIL HFA/VENTOLIN HFA) 108 (90 Base) MCG/ACT inhaler     Sig: Inhale 2 puffs into the lungs every 4 hours as needed for shortness of breath / dyspnea or wheezing     Dispense:  1 Inhaler     Refill:  1     Pharmacy may dispense brand covered by insurance (Proair, or proventil or ventolin or generic albuterol inhaler)     doxycycline hyclate (VIBRA-TABS) 100 MG tablet     Sig: Take 1 tablet (100 mg) by mouth 2 times daily     Dispense:  14 tablet     Refill:  0     methylPREDNISolone (MEDROL DOSEPAK) 4 MG tablet therapy pack     Sig: Follow package instructions     Dispense:  21 tablet     Refill:  0     Orders Placed This Encounter   Procedures     XR Hand Bilateral G/E 3 Views     Anti Nuclear Josselin IgG by IFA with Reflex     Rheumatoid factor     Erythrocyte sedimentation rate auto     CRP inflammation     Cyclic Citrullinated Peptide Antibody IgG     EMG       3. Patient needs to follow up in if no improvement,or sooner if worsening of symptoms or other symptoms develop.  FURTHER TESTING:       - hand xrays  Nerve conduction studies  I will place order. Please call [phone] to schedule.  Will follow up and/or notify patient of  results via My Chart to determine further need for followup

## 2020-01-07 LAB
ANA PAT SER IF-IMP: ABNORMAL
ANA SER QL IF: ABNORMAL
ANA TITR SER IF: ABNORMAL {TITER}
CCP AB SER IA-ACNC: 1 U/ML
RHEUMATOID FACT SER NEPH-ACNC: 21 IU/ML (ref 0–20)

## 2020-01-07 NOTE — RESULT ENCOUNTER NOTE
Kasia Doherty,    Attached are your test results.  Inflammatory markers are normal    Please contact us if you have any questions.    Eneida Aviles, CNP

## 2020-01-08 ENCOUNTER — MYC MEDICAL ADVICE (OUTPATIENT)
Dept: PEDIATRICS | Facility: CLINIC | Age: 30
End: 2020-01-08
Payer: COMMERCIAL

## 2020-01-08 DIAGNOSIS — M79.641 BILATERAL HAND PAIN: Primary | ICD-10-CM

## 2020-01-08 DIAGNOSIS — R76.8 ELEVATED ANTINUCLEAR ANTIBODY (ANA) LEVEL: ICD-10-CM

## 2020-01-08 DIAGNOSIS — R76.8 ELEVATED RHEUMATOID FACTOR: ICD-10-CM

## 2020-01-08 DIAGNOSIS — M79.642 BILATERAL HAND PAIN: Primary | ICD-10-CM

## 2020-01-09 LAB
CCP AB SER IA-ACNC: 1 U/ML
DSDNA AB SER-ACNC: 3 IU/ML

## 2020-01-10 NOTE — RESULT ENCOUNTER NOTE
Kasia Doherty,    Attached are your test results.  Further lab tests for rheumatoid and lupus are negative    Please contact us if you have any questions.    Eneida Aviles, CNP

## 2020-03-13 ENCOUNTER — TELEPHONE (OUTPATIENT)
Dept: NEUROLOGY | Facility: CLINIC | Age: 30
End: 2020-03-13

## 2020-03-13 NOTE — TELEPHONE ENCOUNTER
I called and left a message for patient to call us back regarding appt with Dr. Goodman on Monday 3/16/2020.    Anushka Perez LPN

## 2020-03-13 NOTE — TELEPHONE ENCOUNTER
Patient returned call and I explained that she will be screened at door for High risk symptoms/exposure and gave her the option to reschedule pt declined.    Anushka Perez LPN

## 2020-03-16 ENCOUNTER — TELEPHONE (OUTPATIENT)
Dept: NEUROLOGY | Facility: CLINIC | Age: 30
End: 2020-03-16

## 2020-03-16 NOTE — TELEPHONE ENCOUNTER
[facility] Call Center    Phone Message    May a detailed message be left on voicemail: yes     Reason for Call: Patient called stating that she can't wait the 4-6 weeks for an EMG. She said that her hands hurt so bad and don't function properly. Please advise. Thank you.    Action Taken: Message routed to:  Adult Clinics: Neurology p [id number]    Travel Screening: Not Applicable

## 2020-03-17 ENCOUNTER — E-VISIT (OUTPATIENT)
Dept: PEDIATRICS | Facility: CLINIC | Age: 30
End: 2020-03-17
Payer: COMMERCIAL

## 2020-03-17 DIAGNOSIS — G56.03 CARPAL TUNNEL SYNDROME ON BOTH SIDES: ICD-10-CM

## 2020-03-17 DIAGNOSIS — M79.642 BILATERAL HAND PAIN: Primary | ICD-10-CM

## 2020-03-17 DIAGNOSIS — M79.641 BILATERAL HAND PAIN: Primary | ICD-10-CM

## 2020-03-17 PROCEDURE — 99207 ZZC NON-BILLABLE SERV PER CHARTING: CPT | Performed by: NURSE PRACTITIONER

## 2020-03-17 NOTE — TELEPHONE ENCOUNTER
"See documentation in \"documentation only\" encounter fir further information.  Savita Avila, RNCC  Neurology     "

## 2020-03-23 ENCOUNTER — TELEPHONE (OUTPATIENT)
Dept: NEUROLOGY | Facility: CLINIC | Age: 30
End: 2020-03-23

## 2020-03-23 ENCOUNTER — DOCUMENTATION ONLY (OUTPATIENT)
Dept: NEUROLOGY | Facility: CLINIC | Age: 30
End: 2020-03-23

## 2020-03-25 ENCOUNTER — TELEPHONE (OUTPATIENT)
Dept: NEUROLOGY | Facility: CLINIC | Age: 30
End: 2020-03-25

## 2020-03-25 NOTE — TELEPHONE ENCOUNTER
Left voicemail for patient to call [phone] if she would like to schedule EMG for 8am on Monday, 03/30, with Dr. Jose J Goodman.

## 2020-03-27 NOTE — PROGRESS NOTES
I called the patient and left another message explaining that we can do the test that Natalia Aviles requested but we do need her to call and schedule. I am available to do the procedure Monday. She did not respond to our lab's earlier message.

## 2020-03-27 NOTE — TELEPHONE ENCOUNTER
"Addendum 3/27/2020    Patient was given an EMG appointment for 3/23/2020. She did not arrive for that appointment and later reportedly indicated she was told \"everything was closed.\" I reached out to PCP to determine if a reschedule for 3/30/2020 would be acceptable or if she needed to be seen sooner and was told 3/30/2020 was acceptable.  "

## 2020-04-30 ENCOUNTER — TELEPHONE (OUTPATIENT)
Dept: RHEUMATOLOGY | Facility: CLINIC | Age: 30
End: 2020-04-30

## 2020-04-30 NOTE — TELEPHONE ENCOUNTER
Attempted to reach patient by phone to advise that appointment with Dr. Baig on 5/5/20 needs to be changed to a VIDEO visit or rescheduled to July or later.  No answer and mailbox is full so unable to leave message. Will try to send [facility] message as well.     Carol Westholter

## 2020-07-21 ENCOUNTER — VIRTUAL VISIT (OUTPATIENT)
Dept: PEDIATRICS | Facility: CLINIC | Age: 30
End: 2020-07-21
Payer: COMMERCIAL

## 2020-07-21 DIAGNOSIS — R76.8 ELEVATED ANTINUCLEAR ANTIBODY (ANA) LEVEL: ICD-10-CM

## 2020-07-21 DIAGNOSIS — M79.641 BILATERAL HAND PAIN: ICD-10-CM

## 2020-07-21 DIAGNOSIS — R20.2 NUMBNESS AND TINGLING IN BOTH HANDS: ICD-10-CM

## 2020-07-21 DIAGNOSIS — R76.8 ELEVATED RHEUMATOID FACTOR: ICD-10-CM

## 2020-07-21 DIAGNOSIS — M79.642 BILATERAL HAND PAIN: ICD-10-CM

## 2020-07-21 DIAGNOSIS — R20.0 NUMBNESS AND TINGLING IN BOTH HANDS: ICD-10-CM

## 2020-07-21 DIAGNOSIS — M79.641 BILATERAL HAND PAIN: Primary | ICD-10-CM

## 2020-07-21 DIAGNOSIS — M79.642 BILATERAL HAND PAIN: Primary | ICD-10-CM

## 2020-07-21 LAB
CRP SERPL-MCNC: <2.9 MG/L (ref 0–8)
ERYTHROCYTE [SEDIMENTATION RATE] IN BLOOD BY WESTERGREN METHOD: 5 MM/H (ref 0–20)

## 2020-07-21 PROCEDURE — 36415 COLL VENOUS BLD VENIPUNCTURE: CPT | Performed by: NURSE PRACTITIONER

## 2020-07-21 PROCEDURE — 86038 ANTINUCLEAR ANTIBODIES: CPT | Performed by: NURSE PRACTITIONER

## 2020-07-21 PROCEDURE — 85652 RBC SED RATE AUTOMATED: CPT | Performed by: NURSE PRACTITIONER

## 2020-07-21 PROCEDURE — 99213 OFFICE O/P EST LOW 20 MIN: CPT | Mod: 95 | Performed by: NURSE PRACTITIONER

## 2020-07-21 PROCEDURE — 86769 SARS-COV-2 COVID-19 ANTIBODY: CPT | Mod: 90 | Performed by: NURSE PRACTITIONER

## 2020-07-21 PROCEDURE — 86431 RHEUMATOID FACTOR QUANT: CPT | Performed by: NURSE PRACTITIONER

## 2020-07-21 PROCEDURE — 99000 SPECIMEN HANDLING OFFICE-LAB: CPT | Performed by: NURSE PRACTITIONER

## 2020-07-21 PROCEDURE — 86140 C-REACTIVE PROTEIN: CPT | Performed by: NURSE PRACTITIONER

## 2020-07-21 RX ORDER — MELOXICAM 7.5 MG/1
7.5-15 TABLET ORAL DAILY
Qty: 40 TABLET | Refills: 0 | Status: SHIPPED | OUTPATIENT
Start: 2020-07-21 | End: 2020-12-10

## 2020-07-21 NOTE — LETTER
July 24, 2020        Xiomara NEDRA Chas  [address]  [facility] [address]      COVID-19 Antibody Screen   Date Value Ref Range Status   07/21/2020 Negative  Final     Comment:     No COVID-19 antibodies detected.  Patients within 10 days of symptom onset for   COVID-19 may not produce sufficient levels of detectable antibodies.    Immunocompromised COVID-19 patients may take longer to develop antibodies.       COVID-19 Antibody, IgG Titer   Date Value Ref Range Status   07/21/2020 Not Applicable  Final     Comment:     Qualitative screen for total antibodies to COVID-19 (SARS-CoV-2) with   semi-quantitative measurement of IgG COVID-19 antibodies by endpoint titer.    COVID-19 antibodies may be elevated due to a past or current infection.  Negative results do not rule out COVID-19 infection.  Results from antibody   testing should not be used as the sole basis to diagnose or exclude SARS-CoV-2   infection or to inform infection status.  COVID-19 PCR test should be ordered   if current infection is suspected.  False positive results may occur in rare   cases due to cross-reacting antibodies.  This test was developed and its performance characteristics determined by the   [facility] ([facility]),   which is regulated under CLIA as qualified to perform high-complexity testing.    This test has not been reviewed by the FDA.  Testing performed by [facility], [facility], [address], [address], [place] [address]         No results found for: COVAB      You have tested NEGATIVE for COVID-19 antibodies. This suggests you have not had or been exposed to COVID-19. But it does not mean that for sure.     The test finds antibodies in most people 10 days after they get sick. For some people, it takes longer than 10 days for antibodies to show up. Others may never show antibodies against COVID-19, especially if  they have weak immune systems.    If you have COVID-19 symptoms now, please stay home and away from others.     What is antibody testing?    This is a kind of blood test. We take a small sample of your blood, and then test it for something called  antibodies.      Your body makes antibodies to fight infection. If your blood has antibodies for a certain germ, it means you ve been infected with that germ in the past.     Sometimes, antibodies stay in your body for years after you ve had the infection. They can be there even if the germ didn t make you sick. They are a sign that your body fought off the infection.    Will this test find antibodies in everyone who s had COVID-19?    No. The test finds antibodies in most people 10 days after they get sick. For some people, it takes longer than 10 days for antibodies to show up. Others may never show antibodies against COVID-19, especially if they have weak immune systems.    What does it mean if the test finds COVID-19 antibodies?    If we find these antibodies, it suggests:     This person has had the virus.     Their body s immune system fought the virus.     We don t know if this will help protect someone from getting COVID-19 again. Scientists are still learning about this.    What are the signs of COVID-19?    Signs of COVID-19 can appear from 2 to 14 days (up to 2 weeks) after you re infected. Some people have no symptoms or only mild symptoms. Others get very sick. The most common symptoms are:          Cough    Shortness of breath or trouble breathing  Or at least 2 of these symptoms:    Fever    Chills    Repeated shaking with chills    Muscle pain    Headache    Sore throat    Losing your sense of taste or smell    You may have other symptoms. Please contact your doctor or clinic for any symptoms that worry you.    Where can I get more information?     To learn the [place] s guidelines for staying home, please visit the [place] Department of Health website  at https://www.health.state.[place]/diseases/coronavirus/basics.html    To learn more about COVID-19 and how to care for yourself at home, please visit the CDC website at https://www.cdc.gov/coronavirus/2019-ncov/about/steps-when-sick.html    For more options for care at [facility], please visit our website at [email or web address]    [facility] ([place]) COVID-19 Hotline:  [phone]

## 2020-07-21 NOTE — PROGRESS NOTES
"Xiomara Doherty is a 29 year old female who is being evaluated via a billable video visit.      The patient has been notified of following:     \"This video visit will be conducted via a call between you and your physician/provider. We have found that certain health care needs can be provided without the need for an in-person physical exam.  This service lets us provide the care you need with a video conversation.  If a prescription is necessary we can send it directly to your pharmacy.  If lab work is needed we can place an order for that and you can then stop by our lab to have the test done at a later time.    Video visits are billed at different rates depending on your insurance coverage.  Please reach out to your insurance provider with any questions.    If during the course of the call the physician/provider feels a video visit is not appropriate, you will not be charged for this service.\"    Patient has given verbal consent for Video visit? Yes  How would you like to obtain your AVS? MyChart  If you are dropped from the video visit, the video invite should be resent to: Text to cell phone: [phone]  Will anyone else be joining your video visit? No      Subjective     Xiomara Doherty is a 29 year old female who presents today via video visit for the following health issues:    HPI    Joint Pain    Onset: 6-7 months    Description:   Location: bilateral hands  Character: Dull ache, Burning and throbbing    Intensity: moderate    Progression of Symptoms: worse    Accompanying Signs & Symptoms:  Other symptoms: swelling, weakness of hands, hard to open a jar    History:   Previous similar pain: no       Precipitating factors:   Trauma or overuse: no     Alleviating factors:  Improved by: heat    Therapies Tried and outcome: heating pad    Taking multiple Advil, Motrin, aleve   Pain has been going on for 6 months  Pain in hands so bad can not open a jar of peanut butter   If holding onto phone will have " pain   Nerve conduction studies were supposed to be done in May and got cancelled due to Covid   Started in thumb but now her wrist   Hands feel like they are puffy   Rheumatoid marker and ranjit mildly elevated will repeat today   Has Rheumatology appointment in September   We had appointment in early this year for this issue and pain is getting worse   Video Start Time: 8:24 AM      Patient Active Problem List   Diagnosis     Menorrhagia     Back pain     Urinary frequency     Adjustment disorder with anxiety     Generalized anxiety disorder     Psoriasis     Esophageal reflux     New daily persistent headache     Post-concussion headache     Supervision of other normal pregnancy, antepartum     Need for rhogam due to Rh negative mother     Owen esophagus     Numbness and tingling     Past Surgical History:   Procedure Laterality Date     ARTHROSCOPY KNEE  7/26/2011    Procedure:ARTHROSCOPY KNEE; diagnostic; Surgeon:VIOLETTA JIM; Location:[facility]     ARTHROSCOPY KNEE RT/LT  10/20/09    right knee     ARTHROSCOPY KNEE WITH RETINACULAR RELEASE  7/26/2011    Procedure:ARTHROSCOPY KNEE WITH RETINACULAR RELEASE; lateral release; Surgeon:VIOLETTA JIM; Location:[facility]     BIOPSY       COLONOSCOPY  06/21/12    [facility]     COLONOSCOPY WITH CO2 INSUFFLATION N/A 10/29/2014    Procedure: COLONOSCOPY WITH CO2 INSUFFLATION;  Surgeon: Duane, William Charles, MD;  Location: [facility]     COMBINED ESOPHAGOSCOPY, GASTROSCOPY, DUODENOSCOPY (EGD) WITH CO2 INSUFFLATION N/A 4/24/2019    Procedure: ESOPHAGOGASTRODUODENOSCOPY, WITH CO2 INSUFFLATION;  Surgeon: Duane, William Charles, MD;  Location: [facility]     ESOPHAGOSCOPY, GASTROSCOPY, DUODENOSCOPY (EGD), COMBINED  2/3/2011    COMBINED ESOPHAGOSCOPY, GASTROSCOPY, DUODENOSCOPY (EGD), BIOPSY SINGLE OR MULTIPLE performed by DINAH VALDEZ at  GI     ESOPHAGOSCOPY, GASTROSCOPY, DUODENOSCOPY (EGD), COMBINED N/A 10/29/2014    Procedure: COMBINED ESOPHAGOSCOPY, GASTROSCOPY, DUODENOSCOPY  (EGD), BIOPSY SINGLE OR MULTIPLE;  Surgeon: Duane, William Charles, MD;  Location: [facility]     HC REMOVE TONSILS/ADENOIDS,<11 Y/O  1997     HC UGI ENDOSCOPY, SIMPLE EXAM  06/21/12    [facility]     IRRIGATION AND DEBRIDEMENT KNEE, COMBINED  7/26/2011    Procedure:COMBINED IRRIGATION AND DEBRIDEMENT KNEE; dedridement; Surgeon:VIOLETTA JIM; Location:[facility]     SOFT TISSUE SURGERY         Social History     Tobacco Use     Smoking status: Never Smoker     Smokeless tobacco: Never Used   Substance Use Topics     Alcohol use: No     Frequency: Never     Family History   Problem Relation Age of Onset     Cancer Father         skin cancer on face     Hypertension Father      Hypertension Paternal Grandfather      Prostate Cancer Maternal Grandfather      Asthma Sister         sports         Current Outpatient Medications   Medication Sig Dispense Refill     meloxicam (MOBIC) 7.5 MG tablet Take 1-2 tablets (7.5-15 mg) by mouth daily 40 tablet 0     meloxicam (MOBIC) 7.5 MG tablet Take 1-2 tablets (7.5-15 mg) by mouth daily (Patient not taking: Reported on 7/21/2020) 30 tablet 1     Allergies   Allergen Reactions     Nuts Shortness Of Breath     Pineapple Shortness Of Breath and Anaphylaxis     Pt. Allergic to pineapple     Fentanyl Itching     Vicodin [Hydrocodone-Acetaminophen] Nausea and Vomiting     BP Readings from Last 3 Encounters:   01/06/20 100/60   09/25/19 114/50   07/30/19 98/60    Wt Readings from Last 3 Encounters:   01/06/20 61.3 kg (135 lb 3.2 oz)   09/25/19 57.5 kg (126 lb 11.2 oz)   07/30/19 56 kg (123 lb 6.4 oz)                    Reviewed and updated as needed this visit by Provider         Review of Systems   Constitutional, HEENT, cardiovascular, pulmonary, gi and gu systems are negative, except as otherwise noted.      Objective       Physical Exam     GENERAL: alert and no distress  EYES: Eyes grossly normal to inspection.  No discharge or erythema, or obvious scleral/conjunctival  abnormalities.  RESP: No audible wheeze, cough, or visible cyanosis.  No visible retractions or increased work of breathing.    MS: No gross musculoskeletal defects noted.  Normal range of motion.  No visible edema.  SKIN: Visible skin clear. No significant rash, abnormal pigmentation or lesions.  NEURO: Cranial nerves grossly intact.  Mentation and speech appropriate for age.  PSYCH: Mentation appears normal, affect normal/bright, judgement and insight intact, normal speech and appearance well-groomed.      Diagnostic Test Results:  Labs reviewed in Epic  Results for orders placed or performed in visit on 07/21/20   COVID-19 Virus (Coronavirus) Antibody & Titer Reflex     Status: None   Result Value Ref Range    COVID-19 Antibody Screen Negative     COVID-19 Antibody, IgG Titer Not Applicable    Erythrocyte sedimentation rate auto     Status: None   Result Value Ref Range    Sed Rate 5 0 - 20 mm/h   CRP inflammation     Status: None   Result Value Ref Range    CRP Inflammation <2.9 0.0 - 8.0 mg/L   Anti Nuclear Josselin IgG by IFA with Reflex     Status: None   Result Value Ref Range    VINCENZO interpretation Negative NEG^Negative   Rheumatoid factor     Status: None   Result Value Ref Range    Rheumatoid Factor <7 <12 IU/mL           Assessment & Plan     Xiomara was seen today for hand burn.    Diagnoses and all orders for this visit:    Bilateral hand pain  -     meloxicam (MOBIC) 7.5 MG tablet; Take 1-2 tablets (7.5-15 mg) by mouth daily  -     COVID-19 Virus (Coronavirus) Antibody & Titer Reflex; Future    Numbness and tingling in both hands  Need to schedule NCS as discussed   Will follow up and/or notify patient of  results via My Chart to determine further need for followup    Elevated antinuclear antibody (VINCENZO) level  Will follow up and/or notify patient of  results via My Chart to determine further need for followup      Elevated rheumatoid factor  Will follow up and/or notify patient of  results via My Chart to  determine further need for followup      See Patient Instructions  Patient Instructions     PLAN:   1.   Symptomatic therapy suggested:   prescription for NSAID given    2.  Orders Placed This Encounter   Medications     meloxicam (MOBIC) 7.5 MG tablet     Sig: Take 1-2 tablets (7.5-15 mg) by mouth daily     Dispense:  40 tablet     Refill:  0     Orders Placed This Encounter   Procedures     COVID-19 Virus (Coronavirus) Antibody & Titer Reflex       3. Patient needs to follow up in if no improvement,or sooner if worsening of symptoms or other symptoms develop.  Will follow up and/or notify patient of  results via My Chart to determine further need for followup        No follow-ups on file.    KEL Goldstein CNP  [facility]      Video-Visit Details    Type of service:  Video Visit    Video End Time:8:35 AM    Originating Location (pt. Location): Home    Distant Location (provider location):  [facility]     Platform used for Video Visit: [email or web address]    No follow-ups on file.       KEL Goldstein CNP

## 2020-07-21 NOTE — Clinical Note
Graham Sharpe   Can we get her re scheduled for nerve conduction studies was cancelled due to Covid   Also can we slot her in for lab appointment around 2:15 pm she is bringing her daughter in around that time   Thanks Natalia

## 2020-07-22 LAB
ANA SER QL IF: NEGATIVE
COVID-19 SPIKE RBD ABY TITER: NORMAL
COVID-19 SPIKE RBD ABY: NEGATIVE
RHEUMATOID FACT SER NEPH-ACNC: <7 IU/ML (ref 0–20)

## 2020-07-22 NOTE — RESULT ENCOUNTER NOTE
Kasia Doherty,    Attached are your test results.  Rheumatoid marker is negative   Will need to see what the nerve conduction study shows    Please contact us if you have any questions.    Eneida Aviles, CNP

## 2020-07-22 NOTE — RESULT ENCOUNTER NOTE
Kasia Doherty,    Attached are your test results.  Lupus screen negative    Please contact us if you have any questions.    Eneida Aviles, CNP

## 2020-07-23 NOTE — RESULT ENCOUNTER NOTE
Kasia Doherty,    Attached are your test results.  Covid antibody is negative    Please contact us if you have any questions.    Eneida Aviles, CNP

## 2020-07-24 NOTE — RESULT ENCOUNTER NOTE
Serology (COVID-19) Notification    You have tested negative for COVID-19 antibodies  Letter sent to pt

## 2020-08-04 NOTE — PATIENT INSTRUCTIONS
PLAN:   1.   Symptomatic therapy suggested:   prescription for NSAID given    2.  Orders Placed This Encounter   Medications     meloxicam (MOBIC) 7.5 MG tablet     Sig: Take 1-2 tablets (7.5-15 mg) by mouth daily     Dispense:  40 tablet     Refill:  0     Orders Placed This Encounter   Procedures     COVID-19 Virus (Coronavirus) Antibody & Titer Reflex       3. Patient needs to follow up in if no improvement,or sooner if worsening of symptoms or other symptoms develop.  Will follow up and/or notify patient of  results via My Chart to determine further need for followup

## 2020-08-17 ENCOUNTER — OFFICE VISIT (OUTPATIENT)
Dept: NEUROLOGY | Facility: CLINIC | Age: 30
End: 2020-08-17
Attending: NURSE PRACTITIONER
Payer: COMMERCIAL

## 2020-08-17 ENCOUNTER — APPOINTMENT (OUTPATIENT)
Dept: NURSING | Facility: CLINIC | Age: 30
End: 2020-08-17
Attending: NURSE PRACTITIONER
Payer: COMMERCIAL

## 2020-08-17 DIAGNOSIS — R20.0 NUMBNESS AND TINGLING IN BOTH HANDS: ICD-10-CM

## 2020-08-17 DIAGNOSIS — R20.2 NUMBNESS AND TINGLING IN BOTH HANDS: ICD-10-CM

## 2020-08-17 PROCEDURE — 95913 NRV CNDJ TEST 13/> STUDIES: CPT | Performed by: PSYCHIATRY & NEUROLOGY

## 2020-08-17 NOTE — PROGRESS NOTES
Xiomara Doherty is a 29 year old woman with pain and swelling in the right, then left, hand since December 2019. Symptoms are exacerbated by use and alleviated by shaking the hands. Examination demonstrates normal tone, bulk, and strength in the right hand. She is referred for evaluation of possible entrapment neuropathy.          Bilateral median and ulnar motor and sensory conduction studies demonstrated minimal relative prolongation of the right median latency to the second lumbrical muscle and were otherwise normal.             This is a mildly abnormal study, demonstrated borderline relative prolongation of the right latency to second lumbrical and no other abnormalities of nerve conduction in either upper limb. While this can be seen in the context of right carpal tunnel syndrome, a minimal abnormality in only one parameter is of limited diagnostic value and should be interpreted with caution and in the clinical context.

## 2020-08-17 NOTE — LETTER
8/17/2020         RE: Xiomara Doherty  [address] [facility] [address]        Dear Colleague,    Thank you for referring your patient, Xiomara Doherty, to the [facility]. Please see a copy of my visit note below.    [facility]   EMG Laboratory      Nerve Conduction & EMG Report          Patient:       Xiomara Doherty  Patient ID:    [id number]  Gender:        Female  YOB: 1990  Age:           29 Years 8 Months        History and Examination:  Xiomara Doherty is a 29 year old woman with pain and swelling in the right, then left, hand since December 2019. Symptoms are exacerbated by use and alleviated by shaking the hands. Examination demonstrates normal tone, bulk, and strength in the right hand. She is referred for evaluation of possible entrapment neuropathy.        Techniques:  Motor conduction studies were done with surface recording electrodes. Sensory conduction studies were performed with surface electrodes, unless indicated otherwise by (n), designating the use of subdermal recording electrodes. Temperature was monitored and recorded throughout the study. Upper extremities were maintained at a temperature of 32 degrees Centigrade or higher.  Lower extremities were maintained at a temperature of 31 degrees Centigrade or higher. EMG was done with a concentric needle electrode.       Results:  Bilateral median and ulnar motor and sensory conduction studies demonstrated minimal relative prolongation of the right median latency to the second lumbrical muscle and were otherwise normal. A right radial sensory conduction study was normal.       Interpretation:  This is a mildly abnormal study, demonstrating borderline relative prolongation of the right latency to second lumbrical and no other abnormalities of nerve conduction in either upper limb. While this can be seen in the context of right carpal tunnel syndrome, and has been shown to be the  most sensitive electrodiagnostic test for this condition, a minimal abnormality in only one parameter is of limited diagnostic value and should be interpreted with caution and in the clinical context.    Jose J Goodman MD        Sensory NCS      Nerve / Sites Rec. Site Onset Peak Ref. NP Amp Ref. PP Amp Dist Franck Ref. Temp     ms ms ms  V  V  V cm m/s m/s  C   L MEDIAN - Dig II      Dig II Wrist 1.98 2.55  12.3 10.0 10.9 12.5 63.2 48.0 35.2   R MEDIAN - Dig II      Dig II Wrist 1.98 2.55  10.3 10.0 12.2 12.5 63.2 48.0 33.6   L ULNAR - Dig V      Dig V Wrist 1.98 2.66  9.8 8.0 13.0 12.5 63.2 48.0 33.8   R ULNAR - Dig V      Dig V Wrist 1.93 2.40  10.6 8.0 14.1 12.5 64.9 48.0 34   R RADIAL - Snuff      Forearm Snuff 1.93 2.40  49.6 15.0 64.1 12.5 64.9 48.0 33.3   L MEDIAN - Ulnar - Palmar      Median Wrist 1.46 1.93 2.40 40.1  56.7 8 54.9  32.4      Ulnar Wrist 1.35 2.14 2.40 22.9  34.4 8 59.1  32.9   R MEDIAN - Ulnar - Palmar      Median Wrist 1.35 1.72 2.40 68.7  89.0 8 59.1  34      Ulnar Wrist 1.30 1.88 2.40 12.4  19.7 8 61.4  33.9       Motor NCS      Nerve / Sites Rec. Site Lat Ref. Amp Ref. Rel Amp Dist Franck Ref. Dur. Area Temp.     ms ms mV mV % cm m/s m/s ms %  C   L MEDIAN - APB      Wrist APB 3.33 4.40 7.0 5.0 100 8   6.61 100 32.6      Elbow APB 6.98  7.0  101 22 60.3 48.0 6.41 97.1 32.6   R MEDIAN - APB      Wrist APB 2.50 4.40 11.7 5.0 100 8   4.95 100 33.4      Elbow APB 5.78  11.5  98 20 61.0 48.0 5.10 97 33.5   L ULNAR - ADM      Wrist ADM 2.71 3.50 13.2 5.0 100 8   5.36 100 32      B.Elbow ADM 5.63  12.8  96.5 19 65.1 48.0 5.36 92.3 34.9      A.Elbow ADM 6.88  12.7  95.7 8 64.0 48.0 5.47 94.1 34.9   R ULNAR - ADM      Wrist ADM 2.50 3.50 13.3 5.0 100 8   4.84 100 32.9      B.Elbow ADM 5.26  12.8  96.1 19 68.8 48.0 4.95 100 32.9      A.Elbow ADM 6.67  12.3  92.5 9 64.0 48.0 5.26 102 33.1   L MEDIAN - II Lumb      Median II Lumb 2.92  2.0  100 10   5.78 100 32      Ulnar Palm Int 2.81  3.5  172 10   5.26  135 32.2   R MEDIAN - II Lumb      Median II Lumb 2.97  1.2  100 10   5.94 100 32.7      Ulnar Palm Int 2.55  2.6  219 10   5.26 138 32.4                                      Again, thank you for allowing me to participate in the care of your patient.        Sincerely,        Jose J Goodman MD

## 2020-08-17 NOTE — PROGRESS NOTES
[facility]   EMG Laboratory      Nerve Conduction & EMG Report          Patient:       Xiomara Doherty  Patient ID:    [id number]  Gender:        Female  YOB: 1990  Age:           29 Years 8 Months        History and Examination:  Xiomara Doherty is a 29 year old woman with pain and swelling in the right, then left, hand since December 2019. Symptoms are exacerbated by use and alleviated by shaking the hands. Examination demonstrates normal tone, bulk, and strength in the right hand. She is referred for evaluation of possible entrapment neuropathy.        Techniques:  Motor conduction studies were done with surface recording electrodes. Sensory conduction studies were performed with surface electrodes, unless indicated otherwise by (n), designating the use of subdermal recording electrodes. Temperature was monitored and recorded throughout the study. Upper extremities were maintained at a temperature of 32 degrees Centigrade or higher.  Lower extremities were maintained at a temperature of 31 degrees Centigrade or higher. EMG was done with a concentric needle electrode.       Results:  Bilateral median and ulnar motor and sensory conduction studies demonstrated minimal relative prolongation of the right median latency to the second lumbrical muscle and were otherwise normal. A right radial sensory conduction study was normal.       Interpretation:  This is a mildly abnormal study, demonstrating borderline relative prolongation of the right latency to second lumbrical and no other abnormalities of nerve conduction in either upper limb. While this can be seen in the context of right carpal tunnel syndrome, and has been shown to be the most sensitive electrodiagnostic test for this condition, a minimal abnormality in only one parameter is of limited diagnostic value and should be interpreted with caution and in the clinical context.    Jose J Goodman MD        Sensory NCS      Nerve / Sites  Rec. Site Onset Peak Ref. NP Amp Ref. PP Amp Dist Franck Ref. Temp     ms ms ms  V  V  V cm m/s m/s  C   L MEDIAN - Dig II      Dig II Wrist 1.98 2.55  12.3 10.0 10.9 12.5 63.2 48.0 35.2   R MEDIAN - Dig II      Dig II Wrist 1.98 2.55  10.3 10.0 12.2 12.5 63.2 48.0 33.6   L ULNAR - Dig V      Dig V Wrist 1.98 2.66  9.8 8.0 13.0 12.5 63.2 48.0 33.8   R ULNAR - Dig V      Dig V Wrist 1.93 2.40  10.6 8.0 14.1 12.5 64.9 48.0 34   R RADIAL - Snuff      Forearm Snuff 1.93 2.40  49.6 15.0 64.1 12.5 64.9 48.0 33.3   L MEDIAN - Ulnar - Palmar      Median Wrist 1.46 1.93 2.40 40.1  56.7 8 54.9  32.4      Ulnar Wrist 1.35 2.14 2.40 22.9  34.4 8 59.1  32.9   R MEDIAN - Ulnar - Palmar      Median Wrist 1.35 1.72 2.40 68.7  89.0 8 59.1  34      Ulnar Wrist 1.30 1.88 2.40 12.4  19.7 8 61.4  33.9       Motor NCS      Nerve / Sites Rec. Site Lat Ref. Amp Ref. Rel Amp Dist Franck Ref. Dur. Area Temp.     ms ms mV mV % cm m/s m/s ms %  C   L MEDIAN - APB      Wrist APB 3.33 4.40 7.0 5.0 100 8   6.61 100 32.6      Elbow APB 6.98  7.0  101 22 60.3 48.0 6.41 97.1 32.6   R MEDIAN - APB      Wrist APB 2.50 4.40 11.7 5.0 100 8   4.95 100 33.4      Elbow APB 5.78  11.5  98 20 61.0 48.0 5.10 97 33.5   L ULNAR - ADM      Wrist ADM 2.71 3.50 13.2 5.0 100 8   5.36 100 32      B.Elbow ADM 5.63  12.8  96.5 19 65.1 48.0 5.36 92.3 34.9      A.Elbow ADM 6.88  12.7  95.7 8 64.0 48.0 5.47 94.1 34.9   R ULNAR - ADM      Wrist ADM 2.50 3.50 13.3 5.0 100 8   4.84 100 32.9      B.Elbow ADM 5.26  12.8  96.1 19 68.8 48.0 4.95 100 32.9      A.Elbow ADM 6.67  12.3  92.5 9 64.0 48.0 5.26 102 33.1   L MEDIAN - II Lumb      Median II Lumb 2.92  2.0  100 10   5.78 100 32      Ulnar Palm Int 2.81  3.5  172 10   5.26 135 32.2   R MEDIAN - II Lumb      Median II Lumb 2.97  1.2  100 10   5.94 100 32.7      Ulnar Palm Int 2.55  2.6  219 10   5.26 138 32.4

## 2020-10-15 ENCOUNTER — VIRTUAL VISIT (OUTPATIENT)
Dept: FAMILY MEDICINE | Facility: OTHER | Age: 30
End: 2020-10-15
Payer: COMMERCIAL

## 2020-10-15 PROCEDURE — 99421 OL DIG E/M SVC 5-10 MIN: CPT | Performed by: PHYSICIAN ASSISTANT

## 2020-10-15 NOTE — PROGRESS NOTES
"Date: 10/15/2020 08:40:50  Clinician: Maurice Mclain  Clinician NPI: [id number]  Patient: Xiomara Doherty  Patient : 1990  Patient Address: [address], [place] [address]  Patient Phone: [phone]  Visit Protocol: URI  Patient Summary:  Xiomara is a 29 year old ( : 1990 ) female who initiated a [facility] Visit for COVID-19 (Coronavirus) evaluation and screening. When asked the question \"Please sign me up to receive news, health information and promotions from [facility].\", Xiomara responded \"No\".    Xiomara states her symptoms started 1-2 days ago.   Her symptoms consist of enlarged lymph nodes, a cough, nasal congestion, anosmia, malaise, and ageusia. She is experiencing difficulty breathing due to nasal congestion but she is not short of breath.   Symptom details     Nasal secretions: The color of her mucus is clear.    Cough: Xiomara coughs a few times an hour and her cough is more bothersome at night. Phlegm does not come into her throat when she coughs. She does not believe her cough is caused by post-nasal drip.      Xiomara denies having ear pain, headache, wheezing, fever, vomiting, rhinitis, nausea, facial pain or pressure, myalgias, chills, sore throat, teeth pain, and diarrhea. She also denies taking antibiotic medication in the past month and having recent facial or sinus surgery in the past 60 days.   Precipitating events  She has not recently been exposed to someone with influenza. Xiomara has been in close contact with the following high risk individuals: adults 65 or older and children under the age of 5.   Pertinent COVID-19 (Coronavirus) information  In the past 14 days, Ximoara has not worked in a congregate living setting.   She does not work or volunteer as healthcare worker or a  and does not work or volunteer in a healthcare facility.   Xiomara also has not lived in a congregate living setting in the past 14 days. She does not live with a " healthcare worker.   Xiomara has had a close contact with a laboratory-confirmed COVID-19 patient within 14 days of symptom onset. Additional information about contact with COVID-19 (Coronavirus) patient as reported by the patient (free text): Best friend whose mother in law ( they are also neighbors) whole house has it. She and her family now are showing symptoms as well. After we spent the weekend together.   Since December 2019, Xiomara and has not had upper respiratory infection or influenza-like illness. Has not been diagnosed with lab-confirmed COVID-19 test   Pertinent medical history  Xiomara does not get yeast infections when she takes antibiotics.   Xiomara does not need a return to work/school note.   Weight: 140 lbs   Xiomara smokes or uses smokeless tobacco.   She denies pregnancy and denies breastfeeding. Her last period was over a month ago.   Weight: 140 lbs    MEDICATIONS: No current medications, ALLERGIES: Vicodin  Clinician Response:  Dear Xiomara,   Your symptoms show that you may have coronavirus (COVID-19). This illness can cause fever, cough and trouble breathing. Many people get a mild case and get better on their own. Some people can get very sick.  What should I do?  We would like to test you for this virus.   1. Please call [phone] to schedule your visit. Explain that you were referred by [facility] to have a COVID-19 test. Be ready to share your [facility] visit ID number.  The following will serve as your written order for this COVID Test, ordered by me, for the indication of suspected COVID [Z20.828]: The test will be ordered in [facility], our electronic health record, after you are scheduled. It will show as ordered and authorized by Gualberto Moulton MD.  Order: COVID-19 (Coronavirus) PCR for SYMPTOMATIC testing from [facility].      2. When it's time for your COVID test:  Stay at least 6 feet away from others. (If someone will drive you to your test, stay in the backseat, as far away from the  " as you can.)   Cover your mouth and nose with a mask, tissue or washcloth.  Go straight to the testing site. Don't make any stops on the way there or back.      3.Starting now: Stay home and away from others (self-isolate) until:   You've had no fever---and no medicine that reduces fever---for one full day (24 hours). And...   Your other symptoms have gotten better. For example, your cough or breathing has improved. And...   At least 10 days have passed since your symptoms started.       During this time, don't leave the house except for testing or medical care.   Stay in your own room, even for meals. Use your own bathroom if you can.   Stay away from others in your home. No hugging, kissing or shaking hands. No visitors.  Don't go to work, school or anywhere else.    Clean \"high touch\" surfaces often (doorknobs, counters, handles, etc.). Use a household cleaning spray or wipes. You'll find a full list of  on the EPA website: www.epa.gov/pesticide-registration/list-n-disinfectants-use-against-sars-cov-2.   Cover your mouth and nose with a mask, tissue or washcloth to avoid spreading germs.  Wash your hands and face often. Use soap and water.  Caregivers in these groups are at risk for severe illness due to COVID-19:  o People 65 years and older  o People who live in a nursing home or long-term care facility  o People with chronic disease (lung, heart, cancer, diabetes, kidney, liver, immunologic)  o People who have a weakened immune system, including those who:   Are in cancer treatment  Take medicine that weakens the immune system, such as corticosteroids  Had a bone marrow or organ transplant  Have an immune deficiency  Have poorly controlled HIV or AIDS  Are obese (body mass index of 40 or higher)  Smoke regularly   o Caregivers should wear gloves while washing dishes, handling laundry and cleaning bedrooms and bathrooms.  o Use caution when washing and drying laundry: Don't shake dirty laundry, " and use the warmest water setting that you can.  o For more tips, go to www.cdc.gov/coronavirus/2019-ncov/downloads/10Things.pdf.    How can I take care of myself?    Get lots of rest. Drink extra fluids (unless a doctor has told you not to).   Take Tylenol (acetaminophen) for fever or pain. If you have liver or kidney problems, ask your family doctor if it's okay to take Tylenol.   Adults can take either:    650 mg (two 325 mg pills) every 4 to 6 hours, or...   1,000 mg (two 500 mg pills) every 8 hours as needed.    Note: Don't take more than 3,000 mg in one day. Acetaminophen is found in many medicines (both prescribed and over-the-counter medicines). Read all labels to be sure you don't take too much.   For children, check the Tylenol bottle for the right dose. The dose is based on the child's age or weight.    If you have other health problems (like cancer, heart failure, an organ transplant or severe kidney disease): Call your specialty clinic if you don't feel better in the next 2 days.       Know when to call 911. Emergency warning signs include:    Trouble breathing or shortness of breath Pain or pressure in the chest that doesn't go away Feeling confused like you haven't felt before, or not being able to wake up Bluish-colored lips or face.  Where can I get more information?   [facility] -- About COVID-19: [email or web address]   CDC -- What to Do If You're Sick: www.cdc.gov/coronavirus/2019-ncov/about/steps-when-sick.html   CDC -- Ending Home Isolation: www.cdc.gov/coronavirus/2019-ncov/hcp/disposition-in-home-patients.html   CDC -- Caring for Someone: www.cdc.gov/coronavirus/2019-ncov/if-you-are-sick/care-for-someone.html   [facility] -- Interim Guidance for Hospital Discharge to Home: www.health.[facility].[place]/diseases/coronavirus/hcp/hospdischarge.pdf   [facility] clinical trials (COVID-19 research studies): [email or web address]-clinical-trials    Below are the COVID-19  hotlines at the [place] Department of [facility] ([facility]). Interpreters are available.    For health questions: Call 748-458-1942 or 1-231.494.4683 (7 a.m. to 7 p.m.) For questions about schools and childcare: Call 292-915-1793 or 1-248.773.4407 (7 a.m. to 7 p.m.)    Diagnosis: Contact with and (suspected) exposure to other viral communicable diseases  Diagnosis ICD: Z20.828

## 2020-11-30 ENCOUNTER — OFFICE VISIT (OUTPATIENT)
Dept: PEDIATRICS | Facility: CLINIC | Age: 30
End: 2020-11-30
Payer: COMMERCIAL

## 2020-11-30 ENCOUNTER — TELEPHONE (OUTPATIENT)
Dept: PSYCHOLOGY | Facility: CLINIC | Age: 30
End: 2020-11-30

## 2020-11-30 VITALS
WEIGHT: 153.2 LBS | BODY MASS INDEX: 24.62 KG/M2 | HEIGHT: 66 IN | HEART RATE: 101 BPM | DIASTOLIC BLOOD PRESSURE: 78 MMHG | TEMPERATURE: 98.6 F | SYSTOLIC BLOOD PRESSURE: 115 MMHG | OXYGEN SATURATION: 97 %

## 2020-11-30 DIAGNOSIS — N76.0 BACTERIAL VAGINOSIS: ICD-10-CM

## 2020-11-30 DIAGNOSIS — F41.1 GENERALIZED ANXIETY DISORDER WITH PANIC ATTACKS: ICD-10-CM

## 2020-11-30 DIAGNOSIS — R93.7 ABNORMAL MRI, LUMBAR SPINE: ICD-10-CM

## 2020-11-30 DIAGNOSIS — F41.0 GENERALIZED ANXIETY DISORDER WITH PANIC ATTACKS: ICD-10-CM

## 2020-11-30 DIAGNOSIS — M54.42 BILATERAL LOW BACK PAIN WITH LEFT-SIDED SCIATICA, UNSPECIFIED CHRONICITY: Primary | ICD-10-CM

## 2020-11-30 DIAGNOSIS — N89.8 VAGINAL DISCHARGE: ICD-10-CM

## 2020-11-30 DIAGNOSIS — B96.89 BACTERIAL VAGINOSIS: ICD-10-CM

## 2020-11-30 LAB
SPECIMEN SOURCE: ABNORMAL
WET PREP SPEC: ABNORMAL

## 2020-11-30 PROCEDURE — 99214 OFFICE O/P EST MOD 30 MIN: CPT | Performed by: NURSE PRACTITIONER

## 2020-11-30 PROCEDURE — 87210 SMEAR WET MOUNT SALINE/INK: CPT | Performed by: NURSE PRACTITIONER

## 2020-11-30 RX ORDER — LORAZEPAM 0.5 MG/1
0.25-0.5 TABLET ORAL EVERY 8 HOURS PRN
Qty: 4 TABLET | Refills: 0 | Status: SHIPPED | OUTPATIENT
Start: 2020-11-30 | End: 2020-11-30

## 2020-11-30 RX ORDER — PREDNISONE 20 MG/1
40 TABLET ORAL DAILY
Qty: 10 TABLET | Refills: 0 | Status: SHIPPED | OUTPATIENT
Start: 2020-11-30 | End: 2020-12-05

## 2020-11-30 RX ORDER — METRONIDAZOLE 500 MG/1
500 TABLET ORAL 2 TIMES DAILY
Qty: 14 TABLET | Refills: 0 | Status: SHIPPED | OUTPATIENT
Start: 2020-11-30 | End: 2020-12-07

## 2020-11-30 RX ORDER — LORAZEPAM 0.5 MG/1
0.25-0.5 TABLET ORAL EVERY 8 HOURS PRN
Qty: 4 TABLET | Refills: 0 | Status: SHIPPED | OUTPATIENT
Start: 2020-11-30 | End: 2022-01-19

## 2020-11-30 RX ORDER — OXYCODONE AND ACETAMINOPHEN 5; 325 MG/1; MG/1
1 TABLET ORAL EVERY 6 HOURS PRN
Qty: 12 TABLET | Refills: 0 | Status: SHIPPED | OUTPATIENT
Start: 2020-11-30 | End: 2020-12-03

## 2020-11-30 ASSESSMENT — ANXIETY QUESTIONNAIRES
5. BEING SO RESTLESS THAT IT IS HARD TO SIT STILL: NOT AT ALL
3. WORRYING TOO MUCH ABOUT DIFFERENT THINGS: SEVERAL DAYS
7. FEELING AFRAID AS IF SOMETHING AWFUL MIGHT HAPPEN: NOT AT ALL
1. FEELING NERVOUS, ANXIOUS, OR ON EDGE: MORE THAN HALF THE DAYS
2. NOT BEING ABLE TO STOP OR CONTROL WORRYING: SEVERAL DAYS
6. BECOMING EASILY ANNOYED OR IRRITABLE: MORE THAN HALF THE DAYS
GAD7 TOTAL SCORE: 7

## 2020-11-30 ASSESSMENT — PATIENT HEALTH QUESTIONNAIRE - PHQ9
5. POOR APPETITE OR OVEREATING: SEVERAL DAYS
SUM OF ALL RESPONSES TO PHQ QUESTIONS 1-9: 8

## 2020-11-30 ASSESSMENT — PAIN SCALES - GENERAL: PAINLEVEL: EXTREME PAIN (8)

## 2020-11-30 ASSESSMENT — MIFFLIN-ST. JEOR: SCORE: 1437.69

## 2020-11-30 NOTE — TELEPHONE ENCOUNTER
Provider reached out to [facility] to offer a C as requested by Eneida Aviles. Message was left with contact number to schedule at her convenience.

## 2020-11-30 NOTE — PATIENT INSTRUCTIONS
PLAN:   1.   Symptomatic therapy suggested:   Start on Zoloft one tablet a day   apply heat to affected area, apply ice to affected area, prescription for muscle relaxant, prescription for NSAID given  2.  Orders Placed This Encounter   Medications     sertraline (ZOLOFT) 50 MG tablet     Sig: Take 1 tablet (50 mg) by mouth daily     Dispense:  90 tablet     Refill:  1     predniSONE (DELTASONE) 20 MG tablet     Sig: Take 2 tablets (40 mg) by mouth daily for 5 days     Dispense:  10 tablet     Refill:  0     Orders Placed This Encounter   Procedures     MR Lumbar Spine w/o Contrast     3. Patient needs to follow up in if no improvement,or sooner if worsening of symptoms or other symptoms develop.  FURTHER TESTING:       - MRI lumbar spine   CONSULTATION/REFERRAL to dermatology   Initiated consultation with RAFAEL Conde  for mental health counseling.   Will follow up and/or notify patient of  results via My Chart to determine further need for followup               Patient Name:  Carline Henderson  MR#:  [id number]  : 1943      Patient Education Summary For 2018    During the visit on , Carline Henderson received patient-specific education and/or education materials from Emily Puentes regarding the following topic(s):    Date 2018   Time 2:52 PM   Tobacco Use Screening       Smoking Cessation Counseling Not Applicable   General       After Hours On-Call Info Yes     Simulation Yes     Initial Treatment Yes   Site-Specific Instructions       Fatigue Yes     Skin Care. Yes     Lymphedema Management Yes   Products Recommended       Aloe Gel Yes     Aquaphor Ointment Yes   Prevention Teaching       Lymphedema Prevention Yes   Fibrotic Change Prevention       Breast Yes   Individual Taught       Patient. Yes   Preferred Learning Method       Explanation Preferred. Yes     Printed Material Preferred. Yes   Teaching Method       Explanation. Yes     Printed Material. Yes   Ability to Learn       Motivated. Yes   Barriers to Learning       None Evident. Yes   Interventions to Barriers       Limit Content. Yes     Review/Repeat. Yes   Outcome       Acceptable Level Knwldge/Perf Yes        Authenticated by Emily Puentes on 2018 at 3:29 PM     Advancement Flap (Single) Text: The defect edges were debeveled with a #15 scalpel blade.  Given the location of the defect and the proximity to free margins a single advancement flap was deemed most appropriate.  Using a sterile surgical marker, an appropriate advancement flap was drawn incorporating the defect and placing the expected incisions within the relaxed skin tension lines where possible.    The area thus outlined was incised deep to adipose tissue with a #15 scalpel blade.  The skin margins were undermined to an appropriate distance in all directions utilizing iris scissors.

## 2020-11-30 NOTE — RESULT ENCOUNTER NOTE
Kasia Doherty,    Attached are your test results.  -Yeast vaginal infection test is normal - no signs of a yeast infection.  -Bacterial vaginal infection test is POSITIVE.  ADVISE: starting treatment with metronidazole 500mg twice daily orally for 7 days and a prescription has been sent to your pharmacy.  -Trichomonas vaginal infection test is normal - no signs of a trichomonas infection.   Please contact us if you have any questions.    Eneida Aviles, CNP

## 2020-11-30 NOTE — PROGRESS NOTES
Subjective     Xiomara Doherty is a 29 year old female who presents to clinic today for the following health issues:    HPI      Back Pain  Onset/Duration: 2 weeks   Description:   Location of pain: low back left  Character of pain: dull ache and constant  Pain radiation: radiates into the left leg  New numbness or weakness in legs, not attributed to pain: YES  Intensity: Currently 7.5/10  Progression of Symptoms: worsening and constant  History:   Specific cause: lifting, turning/bending, not sure specifically   Pain interferes with job: YES  History of back problems: recurrent self limited episodes of low back pain in the past, previous degenerative joint disease of the lumbar spine and previous herniated disc  Was discussion about surgery a couple years ago but was able to delay with physical therapy and some medication   Any previous MRI or X-rays: Yes- at [place].  Date 2 years ago   Sees a specialist for back pain: No  Alleviating factors:   Improved by: muscle relaxants and opioids    Precipitating factors:  Worsened by: Lifting, Bending and Walking  Therapies tried and outcome: exercises at home , acetaminophen (Tylenol), muscle relaxants and NSAIDs    Accompanying Signs & Symptoms:  Risk of Fracture: None  Risk of Cauda Equina: None  Risk of Infection: None  Risk of Cancer: None  Risk of Ankylosing Spondylitis: Onset at age <35, male, AND morning back stiffness  no   Labs reviewed in EPIC  BP Readings from Last 3 Encounters:   11/30/20 115/78   01/06/20 100/60   09/25/19 114/50    Wt Readings from Last 3 Encounters:   11/30/20 69.5 kg (153 lb 3.2 oz)   01/06/20 61.3 kg (135 lb 3.2 oz)   09/25/19 57.5 kg (126 lb 11.2 oz)         Also has not gotten an appointment with dermatology related to mole on right side from referral about a year ago as was occupied with issues at home     Also, she has additional complaints of :  Depression or Anxiety - New Diagnosis   Duration of complaint: recently in the last  few month having panic attacks and now always     Abnormal Mood Symptoms      Duration: in the last 4 months     Description:  Depression: no  Anxiety: YES  Panic attacks: YES     Accompanying signs and symptoms: see PHQ-9 and HOLLIS scores    History (similar episodes/previous evaluation): None    Precipitating or alleviating factors: None    Therapies tried and outcome: none  Now having panic attacks at least once a week       Also having some vaginal discharge   Patient Active Problem List   Diagnosis     Menorrhagia     Back pain     Urinary frequency     Adjustment disorder with anxiety     Generalized anxiety disorder     Psoriasis     Esophageal reflux     New daily persistent headache     Post-concussion headache     Supervision of other normal pregnancy, antepartum     Need for rhogam due to Rh negative mother     Owen esophagus     Numbness and tingling     Past Surgical History:   Procedure Laterality Date     ARTHROSCOPY KNEE  7/26/2011    Procedure:ARTHROSCOPY KNEE; diagnostic; Surgeon:VIOLETTA JIM; Location:[facility]     ARTHROSCOPY KNEE RT/LT  10/20/09    right knee     ARTHROSCOPY KNEE WITH RETINACULAR RELEASE  7/26/2011    Procedure:ARTHROSCOPY KNEE WITH RETINACULAR RELEASE; lateral release; Surgeon:VIOLETTA JIM; Location:[facility]     BIOPSY       COLONOSCOPY  06/21/12    [facility]     COLONOSCOPY WITH CO2 INSUFFLATION N/A 10/29/2014    Procedure: COLONOSCOPY WITH CO2 INSUFFLATION;  Surgeon: Duane, William Charles, MD;  Location: [facility]     COMBINED ESOPHAGOSCOPY, GASTROSCOPY, DUODENOSCOPY (EGD) WITH CO2 INSUFFLATION N/A 4/24/2019    Procedure: ESOPHAGOGASTRODUODENOSCOPY, WITH CO2 INSUFFLATION;  Surgeon: Duane, William Charles, MD;  Location: [facility]     ESOPHAGOSCOPY, GASTROSCOPY, DUODENOSCOPY (EGD), COMBINED  2/3/2011    COMBINED ESOPHAGOSCOPY, GASTROSCOPY, DUODENOSCOPY (EGD), BIOPSY SINGLE OR MULTIPLE performed by DINAH VALDEZ at  [facility]     ESOPHAGOSCOPY, GASTROSCOPY, DUODENOSCOPY (EGD), COMBINED  "N/A 10/29/2014    Procedure: COMBINED ESOPHAGOSCOPY, GASTROSCOPY, DUODENOSCOPY (EGD), BIOPSY SINGLE OR MULTIPLE;  Surgeon: Duane, William Charles, MD;  Location: [facility]     HC REMOVE TONSILS/ADENOIDS,<13 Y/O  1997     HC UGI ENDOSCOPY, SIMPLE EXAM  06/21/12    [facility]     IRRIGATION AND DEBRIDEMENT KNEE, COMBINED  7/26/2011    Procedure:COMBINED IRRIGATION AND DEBRIDEMENT KNEE; dedridement; Surgeon:VIOLETTA JIM; Location:[facility]     SOFT TISSUE SURGERY         Social History     Tobacco Use     Smoking status: Never Smoker     Smokeless tobacco: Never Used   Substance Use Topics     Alcohol use: No     Frequency: Never     Family History   Problem Relation Age of Onset     Cancer Father         skin cancer on face     Hypertension Father      Hypertension Paternal Grandfather      Prostate Cancer Maternal Grandfather      Asthma Sister         sports         Current Outpatient Medications   Medication Sig Dispense Refill     albuterol (PROAIR HFA/PROVENTIL HFA/VENTOLIN HFA) 108 (90 Base) MCG/ACT inhaler Inhale 2 puffs into the lungs every 4 hours as needed for shortness of breath / dyspnea or wheezing 1 Inhaler 1     meloxicam (MOBIC) 7.5 MG tablet Take 1-2 tablets (7.5-15 mg) by mouth daily (Patient not taking: Reported on 11/30/2020) 40 tablet 0     meloxicam (MOBIC) 7.5 MG tablet Take 1-2 tablets (7.5-15 mg) by mouth daily (Patient not taking: Reported on 7/21/2020) 30 tablet 1     Allergies   Allergen Reactions     Nuts Shortness Of Breath     Pineapple Shortness Of Breath and Anaphylaxis     Pt. Allergic to pineapple     Fentanyl Itching     Vicodin [Hydrocodone-Acetaminophen] Nausea and Vomiting               Review of Systems   Constitutional, HEENT, cardiovascular, pulmonary, gi and gu systems are negative, except as otherwise noted.      Objective    /78 (BP Location: Right arm, Patient Position: Sitting, Cuff Size: Adult Regular)   Pulse 101   Temp 98.6  F (37  C)   Ht 1.67 m (5' 5.75\")   Wt " 69.5 kg (153 lb 3.2 oz)   SpO2 97%   BMI 24.92 kg/m    Body mass index is 24.92 kg/m .  Physical Exam   GENERAL: healthy, alert and no distress  EYES: Eyes grossly normal to inspection and conjunctivae and sclerae normal  HENT: ear canals and TM's normal, nose and mouth without ulcers or lesions  NECK: no adenopathy  RESP: lungs clear to auscultation - no rales, rhonchi or wheezes  CV: regular rates and rhythm, no murmur, click or rub, peripheral pulses strong and no peripheral edema  ABDOMEN: soft, nontender  MS:Patient appears to be in mild to moderate pain, antalgic gait noted. Lumbosacral spine area reveals no local tenderness or mass.  Painful and reduced LS ROM noted. Straight leg raise is equivacol at 60 degrees on left. DTR's, motor strength and sensation normal.  negative findings: symmetric, no skin lesions, erythema, or scars, positive findings: paraspinal muscle spasm,general movements in the exam room are impaired   SKIN: no suspicious lesions or rashes  NEURO: Normal strength and tone, mentation intact and speech normal  PSYCH: mentation appears normal, affect normal/bright    Results for orders placed or performed in visit on 11/30/20   Wet prep     Status: Abnormal    Specimen: Cervix   Result Value Ref Range    Specimen Description Cervix     Wet Prep No Trichomonas seen     Wet Prep Many  Clue cells seen   (A)     Wet Prep No yeast seen     Wet Prep Moderate  WBC'S seen        Recent Results (from the past 744 hour(s))   MR Lumbar Spine w/o Contrast    Narrative    MR LUMBAR SPINE W/O CONTRAST 12/7/2020 7:14 AM    Provided History: Radiculopathy, > 6wks conservative tx, persistent  sx; Bilateral low back pain with left-sided sciatica, unspecified  chronicity    ICD-10: Bilateral low back pain with left-sided sciatica, unspecified  chronicity    Comparison: None relevant available.    Technique: Sagittal T1-weighted, sagittal STIR, 3D volumetric axial  and sagittal reconstructed T2-weighted images  of the lumbar spine were  obtained without intravenous contrast.     Findings:   There are 5 lumbar-type vertebrae assumed for the purposes of this  dictation. The vertebral body heights are maintained without evidence  of fracture. No evidence of marrow infiltrative processes. There is no  spondylolisthesis. Moderate disc desiccation and narrowing present at  L4-5.    The conus medullaris terminates at L1-2, and appears normal in signal.  Spinal cord is normal. The cauda equina appears unremarkable.    T12-L1: No spinal canal or neuroforaminal stenosis.    L1-2: No spinal canal or neuroforaminal stenosis.    L2-3: No spinal canal or neuroforaminal stenosis.    L3-4: No spinal canal or neuroforaminal stenosis.    L4-5: Disc bulge with superimposed right central disc protrusion.  Increased T2 signal within the protrusion suggests underlying annular  fissure. Protrusion minimally abuts the descending right L5 nerve root  without significant impingement. There is mild bilateral neural  foraminal stenosis. Mild bilateral facet arthropathy.    L5-S1: No spinal canal or neural foraminal stenosis.    Paraspinous tissues are within normal limits.      Impression    Impression:   Disc bulge with superimposed right central disc protrusion at L4-5,  minimally abutting the descending right L5 nerve root without  significant impingement.      I have personally reviewed the examination and initial interpretation  and I agree with the findings.    KRYSTEN REICH MD             Assessment & Plan     Xiomara was seen today for new patient.    Diagnoses and all orders for this visit:    Bilateral low back pain with left-sided sciatica, unspecified chronicity  -     predniSONE (DELTASONE) 20 MG tablet; Take 2 tablets (40 mg) by mouth daily for 5 days  -     MR Lumbar Spine w/o Contrast; Future  -     oxyCODONE-acetaminophen (PERCOCET) 5-325 MG tablet; Take 1 tablet by mouth every 6 hours as needed for pain  -     tiZANidine (ZANAFLEX)  4 MG tablet; Take 1 tablet (4 mg) by mouth 3 times daily as needed for muscle spasms  -     -    -     LORazepam (ATIVAN) 0.5 MG tablet; Take 0.5-1 tablets (0.25-0.5 mg) by mouth every 8 hours as needed for anxiety Take 30 minutes prior to departure.  Do not operate a vehicle after taking this medication (Patient not taking: Reported on 12/9/2020)  -     Orthopedic & Spine  Referral; Future    Generalized anxiety disorder with panic attacks  Will Start:  -     sertraline (ZOLOFT) 50 MG tablet; Take 1 tablet (50 mg) by mouth daily  -     -     LORazepam (ATIVAN) 0.5 MG tablet; Take 0.5-1 tablets (0.25-0.5 mg) by mouth every 8 hours as needed for anxiety Take 30 minutes prior to departure.  Do not operate a vehicle after taking this medication (Patient not taking: Reported on 12/9/2020)  Discussed the pathophysiology of anxiety episodes and the various symptoms seen associated with anxiety episodes.  Discussed possible triggers including fatigue, depression, stress, and chemicals such as alcohol, caffeine and certain drugs.  Discussed the treatment including an aerobic exercise program, adequate rest, and both rescue meds and maintenance meds.    Reviewed concept of depression as function of biochemical imbalance of neurotransmitters/rationale for treatment.  Risks and benefits of medication(s) reviewed with patient.  Questions answered.  Counseling advised  Followup appointment in 1 month(s)  Patient instructed to call for significant side effects medications or problems  Patient advised immediate presentation to hospital for suicidal thought, etc.      Vaginal discharge  -     Wet prep    Bacterial vaginosis  -     metroNIDAZOLE (FLAGYL) 500 MG tablet; Take 1 tablet (500 mg) by mouth 2 times daily for 7 days    Abnormal MRI, lumbar spine  -     Orthopedic & Spine  Referral; Future            See Patient Instructions  Patient Instructions     PLAN:   1.   Symptomatic therapy suggested:   Start on  Zoloft one tablet a day   apply heat to affected area, apply ice to affected area, prescription for muscle relaxant, prescription for NSAID given  2.  Orders Placed This Encounter   Medications     sertraline (ZOLOFT) 50 MG tablet     Sig: Take 1 tablet (50 mg) by mouth daily     Dispense:  90 tablet     Refill:  1     predniSONE (DELTASONE) 20 MG tablet     Sig: Take 2 tablets (40 mg) by mouth daily for 5 days     Dispense:  10 tablet     Refill:  0     Orders Placed This Encounter   Procedures     MR Lumbar Spine w/o Contrast     3. Patient needs to follow up in if no improvement,or sooner if worsening of symptoms or other symptoms develop.  FURTHER TESTING:       - MRI lumbar spine   CONSULTATION/REFERRAL to dermatology   Initiated consultation with RAFAEL Conde  for mental health counseling.   Will follow up and/or notify patient of  results via My Chart to determine further need for followup      No follow-ups on file.    KEL Goldstein [facility]

## 2020-12-01 ENCOUNTER — E-VISIT (OUTPATIENT)
Dept: PEDIATRICS | Facility: CLINIC | Age: 30
End: 2020-12-01
Payer: COMMERCIAL

## 2020-12-01 DIAGNOSIS — H10.32 ACUTE CONJUNCTIVITIS OF LEFT EYE, UNSPECIFIED ACUTE CONJUNCTIVITIS TYPE: Primary | ICD-10-CM

## 2020-12-01 PROCEDURE — 99421 OL DIG E/M SVC 5-10 MIN: CPT | Performed by: NURSE PRACTITIONER

## 2020-12-01 RX ORDER — POLYMYXIN B SULFATE AND TRIMETHOPRIM 1; 10000 MG/ML; [USP'U]/ML
1-2 SOLUTION OPHTHALMIC EVERY 4 HOURS
Qty: 10 ML | Refills: 0 | Status: SHIPPED | OUTPATIENT
Start: 2020-12-01 | End: 2020-12-08

## 2020-12-01 ASSESSMENT — ANXIETY QUESTIONNAIRES: GAD7 TOTAL SCORE: 7

## 2020-12-01 NOTE — PATIENT INSTRUCTIONS

## 2020-12-07 ENCOUNTER — VIRTUAL VISIT (OUTPATIENT)
Dept: PSYCHOLOGY | Facility: CLINIC | Age: 30
End: 2020-12-07
Payer: COMMERCIAL

## 2020-12-07 ENCOUNTER — ANCILLARY PROCEDURE (OUTPATIENT)
Dept: MRI IMAGING | Facility: CLINIC | Age: 30
End: 2020-12-07
Attending: NURSE PRACTITIONER
Payer: COMMERCIAL

## 2020-12-07 DIAGNOSIS — M54.42 BILATERAL LOW BACK PAIN WITH LEFT-SIDED SCIATICA, UNSPECIFIED CHRONICITY: ICD-10-CM

## 2020-12-07 DIAGNOSIS — Z53.9 NO SHOW: Primary | ICD-10-CM

## 2020-12-07 PROCEDURE — 72148 MRI LUMBAR SPINE W/O DYE: CPT | Mod: GC | Performed by: STUDENT IN AN ORGANIZED HEALTH CARE EDUCATION/TRAINING PROGRAM

## 2020-12-07 NOTE — PROGRESS NOTES
Patient did not show for her appointment. Provider called and left a message and encouraged her to reschedule at her convenience.

## 2020-12-08 NOTE — RESULT ENCOUNTER NOTE
Kasia Doherty,    Attached are your test results.  Changes noted at L4 to L5  Which could explain you pain and will refer to orthopedics    Please call [phone] to make appointment  if you do not hear from referrals in the next few days.      Please contact us if you have any questions.    Eneida Aviles, CNP

## 2020-12-09 ENCOUNTER — VIRTUAL VISIT (OUTPATIENT)
Dept: DERMATOLOGY | Facility: CLINIC | Age: 30
End: 2020-12-09
Attending: NURSE PRACTITIONER
Payer: COMMERCIAL

## 2020-12-09 ENCOUNTER — TELEPHONE (OUTPATIENT)
Dept: DERMATOLOGY | Facility: CLINIC | Age: 30
End: 2020-12-09

## 2020-12-09 DIAGNOSIS — L82.1 SEBORRHEIC KERATOSIS: Primary | ICD-10-CM

## 2020-12-09 PROCEDURE — 99202 OFFICE O/P NEW SF 15 MIN: CPT | Mod: 95 | Performed by: DERMATOLOGY

## 2020-12-09 NOTE — PATIENT INSTRUCTIONS
[facility] Dermatology Visit    Thank you for allowing us to participate in your care. Your findings, instructions and follow-up plan are as follows:    Suspected seborrheic keratosis  Will evaluate in person to confirm diagnosis and then consider treatment (freeze versus shave biopsy to remove)    When should I call my doctor?    If you are worsening or not improving, please, contact us or seek urgent care as noted below.     Who should I call with questions (adults)?    [facility] (adult and pediatric): [phone]     [facility] (adult): [phone]    For urgent needs outside of business hours call the [facility] at [phone] and ask for the dermatology resident on call    If this is a medical emergency and you are unable to reach an ER, Call [phone]      Who should I call with questions (pediatric)?  [facility]- Pediatric Dermatology  Dr. Emely Boyle, Dr. Cally Mclaughlin, Dr. Wendy Paz, Helena Schwartz, PA  Dr. Susie Young, Dr. Aleena Padilla & Dr. Jose J Fong  Non Urgent  Nurse Triage Line; [phone]- Bridgett and Serenity RN Care Coordinators   Jade (/Complex ) [phone]    If you need a prescription refill, please contact your pharmacy. Refills are approved or denied by our Physicians during normal business hours, Monday through Fridays  Per office policy, refills will not be granted if you have not been seen within the past year (or sooner depending on your child's condition)    Scheduling Information:  Pediatric Appointment Scheduling and Call Center [phone]  Radiology Scheduling- [phone]  Sedation Unit Scheduling- [phone]  [place] Scheduling- General [phone]; Pediatric Dermatology [phone]  Main  Services: [phone]  [place]serrat[place]: [phone]  Norwegian: [phone]  Hmong/Alvaro/Rwandan: [phone]  Preadmission  Nursing Department Fax Number: [phone] (Fax all pre-operative paperwork to this number)    For urgent matters arising during evenings, weekends, or holidays that cannot wait for normal business hours please call [phone] and ask for the Dermatology Resident On-Call to be paged.
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
## 2021-01-10 ENCOUNTER — HEALTH MAINTENANCE LETTER (OUTPATIENT)
Age: 31
End: 2021-01-10

## 2021-03-29 NOTE — Clinical Note
Graham Marcos   Can you reach out to [facility]?  Thanks Natalia 
Pt needs anxiety rx for her MRI, please send
without difficulty

## 2021-10-23 ENCOUNTER — HEALTH MAINTENANCE LETTER (OUTPATIENT)
Age: 31
End: 2021-10-23

## 2021-11-18 ENCOUNTER — ANCILLARY PROCEDURE (OUTPATIENT)
Dept: ULTRASOUND IMAGING | Facility: CLINIC | Age: 31
End: 2021-11-18
Attending: OBSTETRICS & GYNECOLOGY

## 2021-11-18 ENCOUNTER — OFFICE VISIT (OUTPATIENT)
Dept: OBGYN | Facility: CLINIC | Age: 31
End: 2021-11-18

## 2021-11-18 ENCOUNTER — MYC MEDICAL ADVICE (OUTPATIENT)
Dept: OBGYN | Facility: CLINIC | Age: 31
End: 2021-11-18

## 2021-11-18 VITALS
SYSTOLIC BLOOD PRESSURE: 130 MMHG | BODY MASS INDEX: 23.42 KG/M2 | HEART RATE: 89 BPM | DIASTOLIC BLOOD PRESSURE: 80 MMHG | OXYGEN SATURATION: 99 % | WEIGHT: 144 LBS

## 2021-11-18 DIAGNOSIS — N83.209 SIMPLE OVARIAN CYST: ICD-10-CM

## 2021-11-18 DIAGNOSIS — T83.32XA INTRAUTERINE CONTRACEPTIVE DEVICE THREADS LOST, INITIAL ENCOUNTER: Primary | ICD-10-CM

## 2021-11-18 DIAGNOSIS — N91.2 AMENORRHEA: ICD-10-CM

## 2021-11-18 DIAGNOSIS — T83.32XA INTRAUTERINE CONTRACEPTIVE DEVICE THREADS LOST, INITIAL ENCOUNTER: ICD-10-CM

## 2021-11-18 LAB — HCG UR QL: NEGATIVE

## 2021-11-18 PROCEDURE — 81025 URINE PREGNANCY TEST: CPT | Performed by: OBSTETRICS & GYNECOLOGY

## 2021-11-18 PROCEDURE — 76856 US EXAM PELVIC COMPLETE: CPT

## 2021-11-18 PROCEDURE — 99213 OFFICE O/P EST LOW 20 MIN: CPT | Performed by: OBSTETRICS & GYNECOLOGY

## 2021-11-18 PROCEDURE — 76830 TRANSVAGINAL US NON-OB: CPT
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
## 2021-11-19 NOTE — TELEPHONE ENCOUNTER
I called patient and reviewed the ultrasound findings.   The findings show a benign cyst.  Due to the size, the ultrasound may be repeated at one year, but the reading radiologist suggests to repeat it in 6 months.  The order is placed.  She will call to schedule this.   Questions seem to be answered.    Raúl Hoover MD

## 2021-11-19 NOTE — TELEPHONE ENCOUNTER
Pt had a pelvic US yesterday, 11/18, due to lost IUD strings.  Pt is writing in asking for Dr. Shaw to review and interpret her US results as she states Dr. Hoover told her it could be a couple weeks before he was able to review.    She is able to see the results on her mychart and she has questions about the cyst that was seen and the size and what needs to be done.    Routing to Dr. Hoover as he is in clinic today to review and interpret pt's US from yesterday and comment on the cyst that pt is able to see.    Keila Menchaca RN

## 2022-01-13 NOTE — PROGRESS NOTES
SUBJECTIVE:   CC: Xiomara Doherty is an 31 year old woman who presents for preventive health visit.       Patient has been advised of split billing requirements and indicates understanding: Yes  HPI        Discuss cyst on ovary    Today's PHQ-2 Score:   PHQ-2 ( 1999 [employer]) 7/21/2020   Q1: Little interest or pleasure in doing things 0   Q2: Feeling down, depressed or hopeless 0   PHQ-2 Score 0   PHQ-2 Total Score (12-17 Years)- Positive if 3 or more points; Administer PHQ-A if positive 0       Abuse: Current or Past (Physical, Sexual or Emotional) - No  Do you feel safe in your environment? Yes    Have you ever done Advance Care Planning? (For example, a Health Directive, POLST, or a discussion with a medical provider or your loved ones about your wishes): No, advance care planning information given to patient to review.  Patient declined advance care planning discussion at this time.    Social History     Tobacco Use     Smoking status: Never Smoker     Smokeless tobacco: Never Used   Substance Use Topics     Alcohol use: No     If you drink alcohol do you typically have >3 drinks per day or >7 drinks per week? No    No flowsheet data found.No flowsheet data found.    Reviewed orders with patient.  Reviewed health maintenance and updated orders accordingly - Yes  Lab work is in process    Breast Cancer Screening:  Any new diagnosis of family breast, ovarian, or bowel cancer? NO    FHS-7: No flowsheet data found.    Patient under 40 years of age: Routine Mammogram Screening not recommended.   Pertinent mammograms are reviewed under the imaging tab.    History of abnormal Pap smear: NO - age 30- 65 PAP every 3 years recommended  PAP / HPV 3/21/2018 8/12/2015   PAP (Historical) NIL NIL     Reviewed and updated as needed this visit by clinical staff                Reviewed and updated as needed this visit by Provider                   Review of Systems  CONSTITUTIONAL: NEGATIVE for fever, chills, change in  "weight  INTEGUMENTARU/SKIN: NEGATIVE for worrisome rashes, moles or lesions  EYES: NEGATIVE for vision changes or irritation  ENT: NEGATIVE for ear, mouth and throat problems  RESP: NEGATIVE for significant cough or SOB  BREAST: NEGATIVE for masses, tenderness or discharge  CV: NEGATIVE for chest pain, palpitations or peripheral edema  GI: NEGATIVE for nausea, abdominal pain, heartburn, or change in bowel habits  : NEGATIVE for unusual urinary or vaginal symptoms. Periods are regular.  MUSCULOSKELETAL: NEGATIVE for significant arthralgias or myalgia  NEURO: NEGATIVE for weakness, dizziness or paresthesias  ENDOCRINE: NEGATIVE for temperature intolerance, hair changes.  Has a concerning nevus on her right chest wall.   PSYCHIATRIC: NEGATIVE for changes in mood or affect     OBJECTIVE:   /72 (BP Location: Right arm, Patient Position: Sitting, Cuff Size: Adult Regular)   Pulse 89   Temp 98.9  F (37.2  C) (Tympanic)   Ht 1.676 m (5' 6\")   Wt 69.1 kg (152 lb 5 oz)   BMI 24.58 kg/m    Physical Exam  GENERAL: healthy, alert and no distress  EYES: Eyes grossly normal to inspection, PERRL and conjunctivae and sclerae normal  HENT: ear canals and TM's normal, nose and mouth without ulcers or lesions  NECK: no adenopathy, no asymmetry, masses, or scars and thyroid normal to palpation  RESP: lungs clear to auscultation - no rales, rhonchi or wheezes  BREAST: normal without masses, tenderness or nipple discharge and no palpable axillary masses or adenopathy  CV: regular rate and rhythm, normal S1 S2, no S3 or S4, no murmur, click or rub, no peripheral edema and peripheral pulses strong  ABDOMEN: soft, nontender, no hepatosplenomegaly, no masses and bowel sounds normal   (female): normal female external genitalia, normal urethral meatus, vaginal mucosa pink, moist, well rugated, and normal cervix/adnexa/uterus without masses or discharge.  The IUD strings are not evident at the os.  With a polypectomy forceps the " "cervical os is explored and the strings are grasped on the first attempt.  and with gentle steady traction the IUD was removed from the uterus intact and disposed of following the hazardous waste guidelines.     MS: no gross musculoskeletal defects noted, no edema  SKIN: no suspicious lesions or rashes other the the 0.8 cm dark slightly irregular mole on the left chest wall  NEURO: Normal strength and tone, mentation intact and speech normal  PSYCH: mentation appears normal, affect normal/bright    Diagnostic Test Results:  Labs reviewed in Epic  No results found for this or any previous visit (from the past 24 hour(s)).    ASSESSMENT/PLAN:   (Z00.00) Routine general medical examination at a health care facility  (primary encounter diagnosis)  Comment:   Plan:     (Z12.4) Screening for malignant neoplasm of cervix  Comment:   Plan: Pap screen with HPV - recommended age 30 - 65         years            (Z11.3) Routine screening for STI (sexually transmitted infection)  Comment:   Plan: Chlamydia trachomatis PCR, Neisseria         gonorrhoeae PCR            (Z30.432) Encounter for removal of intrauterine contraceptive device  Comment:   Plan:       COUNSELING:  Reviewed preventive health counseling, as reflected in patient instructions       Contraception       Family planning    Estimated body mass index is 23.42 kg/m  as calculated from the following:    Height as of 11/30/20: 1.67 m (5' 5.75\").    Weight as of 11/18/21: 65.3 kg (144 lb).    Recommend that she follow up with her PCP regarding the lesion on her right chest wall,  Had a long discussion about her ovarian cyst. It does cause her almost constant left sided pain though low level.  She is unable to wear her jeans because of the discomfort.   Reviewed the possibility of torsion, that it shouldn't have an impact on a pregnancy and that if the pain increased she should RTC or with dramatic increase to the ED to r/o torsion.   Recommended to begin PNV and to " RTC if not pregnant in 12 months.           She reports that she has never smoked. She has never used smokeless tobacco.      Counseling Resources:  ATP IV Guidelines  Pooled Cohorts Equation Calculator  Breast Cancer Risk Calculator  BRCA-Related Cancer Risk Assessment: FHS-7 Tool  FRAX Risk Assessment  ICSI Preventive Guidelines  Dietary Guidelines for Americans, 2010  Gradeable's MyPlate  ASA Prophylaxis  Lung CA Screening    Day KEL To CNM  [facility] [place]

## 2022-01-19 ENCOUNTER — OFFICE VISIT (OUTPATIENT)
Dept: OBGYN | Facility: CLINIC | Age: 32
End: 2022-01-19
Payer: MEDICAID

## 2022-01-19 VITALS
SYSTOLIC BLOOD PRESSURE: 111 MMHG | TEMPERATURE: 98.9 F | HEIGHT: 66 IN | HEART RATE: 89 BPM | WEIGHT: 152.31 LBS | DIASTOLIC BLOOD PRESSURE: 72 MMHG | BODY MASS INDEX: 24.48 KG/M2

## 2022-01-19 DIAGNOSIS — Z30.432 ENCOUNTER FOR REMOVAL OF INTRAUTERINE CONTRACEPTIVE DEVICE: ICD-10-CM

## 2022-01-19 DIAGNOSIS — Z11.3 ROUTINE SCREENING FOR STI (SEXUALLY TRANSMITTED INFECTION): ICD-10-CM

## 2022-01-19 DIAGNOSIS — Z00.00 ROUTINE GENERAL MEDICAL EXAMINATION AT A HEALTH CARE FACILITY: Primary | ICD-10-CM

## 2022-01-19 DIAGNOSIS — Z12.4 SCREENING FOR MALIGNANT NEOPLASM OF CERVIX: ICD-10-CM

## 2022-01-19 DIAGNOSIS — N83.202 OVARIAN CYST, LEFT: ICD-10-CM

## 2022-01-19 PROCEDURE — G0145 SCR C/V CYTO,THINLAYER,RESCR: HCPCS | Performed by: ADVANCED PRACTICE MIDWIFE

## 2022-01-19 PROCEDURE — 58301 REMOVE INTRAUTERINE DEVICE: CPT | Performed by: ADVANCED PRACTICE MIDWIFE

## 2022-01-19 PROCEDURE — 87491 CHLMYD TRACH DNA AMP PROBE: CPT | Performed by: ADVANCED PRACTICE MIDWIFE

## 2022-01-19 PROCEDURE — 87624 HPV HI-RISK TYP POOLED RSLT: CPT | Performed by: ADVANCED PRACTICE MIDWIFE

## 2022-01-19 PROCEDURE — 99395 PREV VISIT EST AGE 18-39: CPT | Mod: 25 | Performed by: ADVANCED PRACTICE MIDWIFE

## 2022-01-19 PROCEDURE — 87591 N.GONORRHOEAE DNA AMP PROB: CPT | Performed by: ADVANCED PRACTICE MIDWIFE

## 2022-01-19 ASSESSMENT — MIFFLIN-ST. JEOR: SCORE: 1427.63

## 2022-01-20 LAB
C TRACH DNA SPEC QL NAA+PROBE: NEGATIVE
N GONORRHOEA DNA SPEC QL NAA+PROBE: NEGATIVE

## 2022-01-21 LAB
BKR LAB AP GYN ADEQUACY: NORMAL
BKR LAB AP GYN INTERPRETATION: NORMAL
BKR LAB AP HPV REFLEX: NORMAL
BKR LAB AP PREVIOUS ABNORMAL: NORMAL
PATH REPORT.COMMENTS IMP SPEC: NORMAL
PATH REPORT.COMMENTS IMP SPEC: NORMAL
PATH REPORT.RELEVANT HX SPEC: NORMAL

## 2022-01-24 LAB
HUMAN PAPILLOMA VIRUS 16 DNA: NEGATIVE
HUMAN PAPILLOMA VIRUS 18 DNA: NEGATIVE
HUMAN PAPILLOMA VIRUS FINAL DIAGNOSIS: NORMAL
HUMAN PAPILLOMA VIRUS OTHER HR: NEGATIVE

## 2022-02-14 ENCOUNTER — OFFICE VISIT (OUTPATIENT)
Dept: OBGYN | Facility: CLINIC | Age: 32
End: 2022-02-14
Payer: MEDICAID

## 2022-02-14 VITALS
OXYGEN SATURATION: 97 % | WEIGHT: 146 LBS | SYSTOLIC BLOOD PRESSURE: 124 MMHG | BODY MASS INDEX: 23.57 KG/M2 | HEART RATE: 114 BPM | DIASTOLIC BLOOD PRESSURE: 83 MMHG

## 2022-02-14 DIAGNOSIS — R19.7 DIARRHEA, UNSPECIFIED TYPE: ICD-10-CM

## 2022-02-14 DIAGNOSIS — N92.1 MENOMETRORRHAGIA: Primary | ICD-10-CM

## 2022-02-14 DIAGNOSIS — R10.32 LLQ ABDOMINAL PAIN: ICD-10-CM

## 2022-02-14 PROCEDURE — 99214 OFFICE O/P EST MOD 30 MIN: CPT | Performed by: OBSTETRICS & GYNECOLOGY

## 2022-02-14 RX ORDER — MULTIPLE VITAMINS W/ MINERALS TAB 9MG-400MCG
1 TAB ORAL DAILY
COMMUNITY

## 2022-02-14 RX ORDER — OXYCODONE HYDROCHLORIDE 5 MG/1
5 TABLET ORAL EVERY 6 HOURS PRN
Qty: 12 TABLET | Refills: 0 | Status: SHIPPED | OUTPATIENT
Start: 2022-02-14 | End: 2022-02-17

## 2022-02-14 RX ORDER — DIPHENOXYLATE HCL/ATROPINE 2.5-.025MG
1 TABLET ORAL 4 TIMES DAILY PRN
Qty: 16 TABLET | Refills: 0 | Status: SHIPPED | OUTPATIENT
Start: 2022-02-14 | End: 2022-11-28

## 2022-02-14 RX ORDER — NORETHINDRONE ACETATE 5 MG
5 TABLET ORAL DAILY
Qty: 30 TABLET | Refills: 3 | Status: SHIPPED | OUTPATIENT
Start: 2022-02-14 | End: 2022-11-28
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
## 2022-04-28 ENCOUNTER — OFFICE VISIT (OUTPATIENT)
Dept: OBGYN | Facility: CLINIC | Age: 32
End: 2022-04-28
Payer: COMMERCIAL

## 2022-04-28 VITALS
HEART RATE: 84 BPM | SYSTOLIC BLOOD PRESSURE: 128 MMHG | BODY MASS INDEX: 24.76 KG/M2 | OXYGEN SATURATION: 100 % | WEIGHT: 153.4 LBS | DIASTOLIC BLOOD PRESSURE: 77 MMHG

## 2022-04-28 DIAGNOSIS — Z32.00 PREGNANCY EXAMINATION OR TEST, PREGNANCY UNCONFIRMED: ICD-10-CM

## 2022-04-28 DIAGNOSIS — Z30.430 ENCOUNTER FOR INSERTION OF INTRAUTERINE CONTRACEPTIVE DEVICE: Primary | ICD-10-CM

## 2022-04-28 LAB — HCG UR QL: NEGATIVE

## 2022-04-28 PROCEDURE — 81025 URINE PREGNANCY TEST: CPT | Performed by: OBSTETRICS & GYNECOLOGY

## 2022-04-28 PROCEDURE — 58300 INSERT INTRAUTERINE DEVICE: CPT | Performed by: OBSTETRICS & GYNECOLOGY

## 2022-04-28 NOTE — PROGRESS NOTES
IUD Insertion:  CONSULT:    Is a pregnancy test required: Yes.  Was it positive or negative?  Negative  Was a consent obtained?  Yes    Subjective: Xiomara Doherty is a 31 year old  presents for IUD and desires Mirena type IUD.    Patient has been given the opportunity to ask questions about all forms of birth control, including all options appropriate for Xiomara Doherty. Discussed that no method of birth control, except abstinence is 100% effective against pregnancy or sexually transmitted infection.     Xiomara Doherty understands she may have the IUD removed at any time. IUD should be removed by a health care provider.    The entire insertion procedure was reviewed with the patient, including care after placement.    Patient's last menstrual period was 2022 (exact date).. No allergy to betadine or shellfish. Recent STD screening  HCG Qual Urine   Date Value Ref Range Status   2019 Negative NEG^Negative Final     Comment:     This test is for screening purposes.  Results should be interpreted along with   the clinical picture.  Confirmation testing is available if warranted by   ordering [id number], HCG Quantitative Pregnancy.       hCG Urine Qualitative   Date Value Ref Range Status   2021 Negative Negative Final     Comment:     This test is for screening purposes.  Results should be interpreted along with the clinical picture.  Confirmation testing is available if warranted by ordering [id number], HCG Quantitative Pregnancy.         /77   Pulse 84   Wt 69.6 kg (153 lb 6.4 oz)   LMP 2022 (Exact Date)   SpO2 100%   Breastfeeding No   BMI 24.76 kg/m      Pelvic Exam:   EG/BUS: normal genital architecture without lesions, erythema or abnormal secretions.   Vagina: moist, pink, rugae with physiologic discharge and secretions  Cervix: multiparous no lesions and pink, moist, closed, without lesion or CMT  Uterus: midposition, mobile, no pain  Adnexa: within normal limits  and no masses, nodularity, tenderness    PROCEDURE NOTE: -- IUD Insertion    Reason for Insertion: contraception and abnormal uterine bleeding       Under sterile technique, cervix was visualized with speculum and prepped with Betadine solution swab x 3. Tenaculum was placed for stability. The uterus was gently straightened and sounded to 7.0 cm. IUD prepared for placement, and IUD inserted according to 's instructions without difficulty or significant resitance, and deployed at the fundus. The strings were visualized and trimmed to 2.5 cm from the external os. Tenaculum was removed and hemostasis noted. Speculum removed.  Patient tolerated procedure well.    Lot # [id number]  Exp: 2024/AUG    EBL: minimal    Complications: none    ASSESSMENT:     ICD-10-CM    1. Encounter for insertion of intrauterine contraceptive device  Z30.430 levonorgestrel (MIRENA) 20 MCG/DAY IUD     levonorgestrel (MIRENA) 20 MCG/DAY IUD 20 mcg     INSERTION INTRAUTERINE DEVICE     HCG qualitative urine   2. Pregnancy examination or test, pregnancy unconfirmed  Z32.00         PLAN:    Given 's handouts, including when to have IUD removed, list of danger s/sx, side effects and follow up recommended. Encouraged condom use for prevention of STD. Back up contraception advised for 7 days if progestin method. Advised to call for any fever, for prolonged or severe pain or bleeding, abnormal vaginal discharge, or unable to palpate strings. She was advised to use pain medications (ibuprofen) as needed for mild to moderate pain. Advised to follow-up in clinic in 4-6 weeks for IUD string check if unable to find strings or as directed by provider.     Rowan Pathak, DO

## 2022-04-28 NOTE — PATIENT INSTRUCTIONS
If you have any questions regarding your visit, Please contact your care team.    [facility]: [phone]      [facility] CLINIC HOURS TELEPHONE NUMBER   Rowanriley Pathak DO.    RUTH Mays -  [place] -       Keila RN  Doris, RN  BRIDGETT Longoria     Monday, Thursday  [place]  7am-3pm    Tuesday, Wednesday  [place]  7am-3pm    E/O Friday & 3rd Wednesday  [facility]  [address] [place] [address]  [phone] ask for [facility]    Imaging [phone]       Urgent Care locations:  [facility] Saturday and Sunday   9 am - 5 pm    Monday-Friday   12 pm - 8 pm  Saturday and Sunday   9 am - 5 pm   [phone]     [facility]:  [phone]    If you need a medication refill, please contact your pharmacy. Please allow 3 business days for your refill to be completed.  As always, Thank you for trusting us with your healthcare needs!

## 2022-06-06 ENCOUNTER — MYC MEDICAL ADVICE (OUTPATIENT)
Dept: FAMILY MEDICINE | Facility: CLINIC | Age: 32
End: 2022-06-06
Payer: COMMERCIAL

## 2022-06-06 NOTE — TELEPHONE ENCOUNTER
Needs an in person appointment as not been seen in over a year   Please use same day, NP or Hospital follow up spot if need be

## 2022-06-09 ENCOUNTER — OFFICE VISIT (OUTPATIENT)
Dept: FAMILY MEDICINE | Facility: CLINIC | Age: 32
End: 2022-06-09
Payer: COMMERCIAL

## 2022-06-09 ENCOUNTER — OFFICE VISIT (OUTPATIENT)
Dept: DERMATOLOGY | Facility: CLINIC | Age: 32
End: 2022-06-09
Attending: NURSE PRACTITIONER
Payer: COMMERCIAL

## 2022-06-09 ENCOUNTER — MYC MEDICAL ADVICE (OUTPATIENT)
Dept: FAMILY MEDICINE | Facility: CLINIC | Age: 32
End: 2022-06-09

## 2022-06-09 VITALS
HEART RATE: 117 BPM | DIASTOLIC BLOOD PRESSURE: 72 MMHG | OXYGEN SATURATION: 99 % | BODY MASS INDEX: 25.37 KG/M2 | WEIGHT: 157.2 LBS | RESPIRATION RATE: 24 BRPM | SYSTOLIC BLOOD PRESSURE: 118 MMHG | TEMPERATURE: 98.9 F

## 2022-06-09 VITALS — DIASTOLIC BLOOD PRESSURE: 87 MMHG | HEART RATE: 102 BPM | SYSTOLIC BLOOD PRESSURE: 122 MMHG | OXYGEN SATURATION: 100 %

## 2022-06-09 DIAGNOSIS — Z13.29 SCREENING FOR THYROID DISORDER: ICD-10-CM

## 2022-06-09 DIAGNOSIS — L82.0 INFLAMED SEBORRHEIC KERATOSIS: ICD-10-CM

## 2022-06-09 DIAGNOSIS — R53.83 FATIGUE, UNSPECIFIED TYPE: ICD-10-CM

## 2022-06-09 DIAGNOSIS — N92.4 EXCESSIVE BLEEDING IN PREMENOPAUSAL PERIOD: ICD-10-CM

## 2022-06-09 DIAGNOSIS — D22.9 CHANGE IN MOLE: ICD-10-CM

## 2022-06-09 DIAGNOSIS — B36.0 TV (TINEA VERSICOLOR): Primary | ICD-10-CM

## 2022-06-09 DIAGNOSIS — M79.605 LOW BACK PAIN RADIATING TO LEFT LOWER EXTREMITY: Primary | ICD-10-CM

## 2022-06-09 DIAGNOSIS — M54.10 RADICULAR PAIN OF LEFT LOWER EXTREMITY: ICD-10-CM

## 2022-06-09 DIAGNOSIS — M54.50 LOW BACK PAIN RADIATING TO LEFT LOWER EXTREMITY: Primary | ICD-10-CM

## 2022-06-09 DIAGNOSIS — R63.5 WEIGHT GAIN: ICD-10-CM

## 2022-06-09 DIAGNOSIS — Z13.0 SCREENING FOR DISORDER OF BLOOD AND BLOOD-FORMING ORGANS: ICD-10-CM

## 2022-06-09 LAB
ALBUMIN SERPL-MCNC: 4.3 G/DL (ref 3.4–5)
ALP SERPL-CCNC: 68 U/L (ref 40–150)
ALT SERPL W P-5'-P-CCNC: 45 U/L (ref 0–50)
ANION GAP SERPL CALCULATED.3IONS-SCNC: 3 MMOL/L (ref 3–14)
AST SERPL W P-5'-P-CCNC: 25 U/L (ref 0–45)
BILIRUB SERPL-MCNC: 0.5 MG/DL (ref 0.2–1.3)
BUN SERPL-MCNC: 14 MG/DL (ref 7–30)
CALCIUM SERPL-MCNC: 9.8 MG/DL (ref 8.5–10.1)
CHLORIDE BLD-SCNC: 103 MMOL/L (ref 94–109)
CO2 SERPL-SCNC: 30 MMOL/L (ref 20–32)
CREAT SERPL-MCNC: 0.87 MG/DL (ref 0.52–1.04)
ERYTHROCYTE [DISTWIDTH] IN BLOOD BY AUTOMATED COUNT: 12.5 % (ref 10–15)
FERRITIN SERPL-MCNC: 49 NG/ML (ref 12–150)
GFR SERPL CREATININE-BSD FRML MDRD: >90 ML/MIN/1.73M2
GLUCOSE BLD-MCNC: 95 MG/DL (ref 70–99)
HCT VFR BLD AUTO: 45.3 % (ref 35–47)
HGB BLD-MCNC: 14.9 G/DL (ref 11.7–15.7)
MCH RBC QN AUTO: 31.2 PG (ref 26.5–33)
MCHC RBC AUTO-ENTMCNC: 32.9 G/DL (ref 31.5–36.5)
MCV RBC AUTO: 95 FL (ref 78–100)
PLATELET # BLD AUTO: 318 10E3/UL (ref 150–450)
POTASSIUM BLD-SCNC: 4.7 MMOL/L (ref 3.4–5.3)
PROT SERPL-MCNC: 8.4 G/DL (ref 6.8–8.8)
RBC # BLD AUTO: 4.78 10E6/UL (ref 3.8–5.2)
SODIUM SERPL-SCNC: 136 MMOL/L (ref 133–144)
TSH SERPL DL<=0.005 MIU/L-ACNC: 0.98 MU/L (ref 0.4–4)
WBC # BLD AUTO: 10.7 10E3/UL (ref 4–11)

## 2022-06-09 PROCEDURE — 80053 COMPREHEN METABOLIC PANEL: CPT | Performed by: NURSE PRACTITIONER

## 2022-06-09 PROCEDURE — 82728 ASSAY OF FERRITIN: CPT | Performed by: NURSE PRACTITIONER

## 2022-06-09 PROCEDURE — 99214 OFFICE O/P EST MOD 30 MIN: CPT | Performed by: NURSE PRACTITIONER

## 2022-06-09 PROCEDURE — 99203 OFFICE O/P NEW LOW 30 MIN: CPT | Mod: 25 | Performed by: NURSE PRACTITIONER

## 2022-06-09 PROCEDURE — 84443 ASSAY THYROID STIM HORMONE: CPT | Performed by: NURSE PRACTITIONER

## 2022-06-09 PROCEDURE — 17110 DESTRUCTION B9 LES UP TO 14: CPT | Performed by: NURSE PRACTITIONER

## 2022-06-09 PROCEDURE — 36415 COLL VENOUS BLD VENIPUNCTURE: CPT | Performed by: NURSE PRACTITIONER

## 2022-06-09 PROCEDURE — 85027 COMPLETE CBC AUTOMATED: CPT | Performed by: NURSE PRACTITIONER

## 2022-06-09 RX ORDER — PREDNISONE 20 MG/1
40 TABLET ORAL DAILY
Qty: 10 TABLET | Refills: 0 | Status: SHIPPED | OUTPATIENT
Start: 2022-06-09 | End: 2022-06-14

## 2022-06-09 RX ORDER — HYDROCODONE BITARTRATE AND ACETAMINOPHEN 5; 325 MG/1; MG/1
1 TABLET ORAL EVERY 6 HOURS PRN
Qty: 10 TABLET | Refills: 0 | Status: SHIPPED | OUTPATIENT
Start: 2022-06-09 | End: 2022-06-12

## 2022-06-09 RX ORDER — OXYCODONE AND ACETAMINOPHEN 5; 325 MG/1; MG/1
1 TABLET ORAL EVERY 6 HOURS PRN
Qty: 12 TABLET | Refills: 0 | Status: SHIPPED | OUTPATIENT
Start: 2022-06-09 | End: 2022-06-12

## 2022-06-09 RX ORDER — KETOCONAZOLE 20 MG/G
CREAM TOPICAL
Qty: 60 G | Refills: 11 | Status: SHIPPED | OUTPATIENT
Start: 2022-06-09 | End: 2023-06-16

## 2022-06-09 ASSESSMENT — PAIN SCALES - GENERAL
PAINLEVEL: EXTREME PAIN (8)
PAINLEVEL: EXTREME PAIN (8)
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
## 2022-06-09 NOTE — PATIENT INSTRUCTIONS
PLAN:   1.   Symptomatic therapy suggested: rest, increase fluids, and call prn if symptoms persist or worsen.  apply heat to affected area, apply ice to affected area, prescription for analgesic given, prescription for muscle relaxant, prescription for NSAID given    2.  Orders Placed This Encounter   Medications    predniSONE (DELTASONE) 20 MG tablet     Sig: Take 2 tablets (40 mg) by mouth daily for 5 days     Dispense:  10 tablet     Refill:  0    HYDROcodone-acetaminophen (NORCO) 5-325 MG tablet     Sig: Take 1 tablet by mouth every 6 hours as needed for severe pain     Dispense:  10 tablet     Refill:  0     Orders Placed This Encounter   Procedures    REVIEW OF HEALTH MAINTENANCE PROTOCOL ORDERS    US Pelvic Transabdominal and Transvaginal    MR Lumbar Spine w/o Contrast    CBC with platelets    Comprehensive metabolic panel    TSH with free T4 reflex    Ferritin       3. Patient needs to follow up in if no improvement,or sooner if worsening of symptoms or other symptoms develop.  FURTHER TESTING:       - pelvic  ultrasound       - MRI lumbar   Will follow up and/or notify patient of  results via My Chart to determine further need for followup  FOLLOW UP WITH SPECIALIST :Gynecology

## 2022-06-09 NOTE — TELEPHONE ENCOUNTER
Provider: Patient is calling because she needs another medicating sent instead of Dimmitt.  The pharmacist told her that due to her allergy to Vicodin it would not be a good idea to take the Norco as they are essentially the same. I have updated her mediation list to reflect hives and vomiting with Vicodin- per the patient.  Are you willing to send over a new medication?  Thank you. Homa Nolan R.N.    This refill/encounter is being handled by a team outside your facility.  If action needs to be taken, please route the encounter back to your team that would normally handle it. Please do not send directly back to the sender. Thank you. Homa Nolan R.N.

## 2022-06-09 NOTE — PROGRESS NOTES
Subjective   Aleksandr is a 31 year old who presents for the following health issues     History of Present Illness       Back Pain:  She presents for follow up of back pain. Patient's back pain is a recurring problem.  Location of back pain:  Right lower back, left lower back, left middle of back, left buttock, right hip and left hip  Description of back pain: sharp, shooting and stabbing  Back pain spreads: right buttocks, left buttocks, right knee, left knee and right shoulder    Since patient first noticed back pain, pain is: always present, but gets better and worse  Does back pain interfere with her job:  Yes      Hypothyroidism:     Since last visit, patient describes the following symptoms::  Constipation, Fatigue and Weight gain    Weight gain::  11-15 lbs.    She eats 0-1 servings of fruits and vegetables daily.She consumes 1 sweetened beverage(s) daily.She exercises with enough effort to increase her heart rate 10 to 19 minutes per day.  She exercises with enough effort to increase her heart rate 3 or less days per week.   She is taking medications regularly.       Back Pain  Duration of complaint: On going for many years, comes and goes. Has been getting more frequent and has been lasting much longer. Pt states there is pain with pretty much ANY activity. Chiropractor stated she should go in for an MRI.    Used to happen a couple times a year and now every 3months   Will see chiropractor   Specific cause: None  Description:   Location of pain: low back bilateral  Character of pain: stabbing  Pain radiation:radiates into the left buttocks, radiates into the left leg and radiates into the left foot  Intensity: moderate, severe  Accompanying Signs & Symptoms:  Fever: no  Numbness or weakness in legs:  Numbness in the left   Dysuria or Hematuria: no  Bowel or bladder incontinence: no  History:   Any injury (lifting, bending, twisting): no  Work Injury: no  History of back problems: recurrent self limited  episodes of low back pain in the past  Any previous MRI or X-rays: YES- several years ago   Any history of back surgery: no  Any cancer history: no  Precipitating factors:   Worsened by: Lifting, Bending, Standing and Lying Flat.  Alleviating factors:  Improved by: lying in bed with legs up   Therapies Tried and outcome: chiropractor, cold therapy, heat therapy and NSAIDS      Pt would like to talk about IUD, huge clots, different colors, lots of bleeding.   Got IUD about a month ago   Having clots and bleeding every day for a month   Her GYN is Dr Shaw     Thyroid check, weight gain and super tired     Lab work is in process  Labs reviewed in EPIC  BP Readings from Last 3 Encounters:   06/09/22 118/72   04/28/22 128/77   02/14/22 124/83    Wt Readings from Last 3 Encounters:   06/09/22 71.3 kg (157 lb 3.2 oz)   04/28/22 69.6 kg (153 lb 6.4 oz)   02/14/22 66.2 kg (146 lb)                  Patient Active Problem List   Diagnosis     Menorrhagia     Back pain     Urinary frequency     Adjustment disorder with anxiety     Generalized anxiety disorder     Psoriasis     Esophageal reflux     New daily persistent headache     Post-concussion headache     Supervision of other normal pregnancy, antepartum     Need for rhogam due to Rh negative mother     Owen esophagus     Numbness and tingling     Ovarian cyst, left     Past Surgical History:   Procedure Laterality Date     ARTHROSCOPY KNEE  7/26/2011    Procedure:ARTHROSCOPY KNEE; diagnostic; Surgeon:VIOLETTA JIM; Location:[facility] OR     ARTHROSCOPY KNEE RT/LT  10/20/09    right knee     ARTHROSCOPY KNEE WITH RETINACULAR RELEASE  7/26/2011    Procedure:ARTHROSCOPY KNEE WITH RETINACULAR RELEASE; lateral release; Surgeon:VIOLETTA JIM; Location:[facility] OR     BIOPSY       COLONOSCOPY  06/21/12    [facility]     COLONOSCOPY WITH CO2 INSUFFLATION N/A 10/29/2014    Procedure: COLONOSCOPY WITH CO2 INSUFFLATION;  Surgeon: Duane, William Charles, MD;  Location:  OR      COMBINED ESOPHAGOSCOPY, GASTROSCOPY, DUODENOSCOPY (EGD) WITH CO2 INSUFFLATION N/A 4/24/2019    Procedure: ESOPHAGOGASTRODUODENOSCOPY, WITH CO2 INSUFFLATION;  Surgeon: Duane, William Charles, MD;  Location: [facility] OR     ESOPHAGOSCOPY, GASTROSCOPY, DUODENOSCOPY (EGD), COMBINED  2/3/2011    COMBINED ESOPHAGOSCOPY, GASTROSCOPY, DUODENOSCOPY (EGD), BIOPSY SINGLE OR MULTIPLE performed by [facility]     ESOPHAGOSCOPY, GASTROSCOPY, DUODENOSCOPY (EGD), COMBINED N/A 10/29/2014    Procedure: COMBINED ESOPHAGOSCOPY, GASTROSCOPY, DUODENOSCOPY (EGD), BIOPSY SINGLE OR MULTIPLE;  Surgeon: Duane, William Charles, MD;  Location: [facility] OR     HC REMOVE TONSILS/ADENOIDS,<13 Y/O  1997     IRRIGATION AND DEBRIDEMENT KNEE, COMBINED  7/26/2011    Procedure:COMBINED IRRIGATION AND DEBRIDEMENT KNEE; dedridement; Surgeon:VIOLETTA JIM; Location: OR     SOFT TISSUE SURGERY       [facility] UGI ENDOSCOPY, SIMPLE EXAM  06/21/12    [facility]       Social History     Tobacco Use     Smoking status: Never Smoker     Smokeless tobacco: Never Used   Substance Use Topics     Alcohol use: Yes     Comment: rare     Family History   Problem Relation Age of Onset     Cancer Father         skin cancer on face     Hypertension Father      Hypertension Paternal Grandfather      Prostate Cancer Maternal Grandfather      Asthma Sister         sports         Current Outpatient Medications   Medication Sig Dispense Refill     levonorgestrel (MIRENA) 20 MCG/DAY IUD 1 each (20 mcg) by Intrauterine route once       multivitamin w/minerals (THERA-VIT-M) tablet Take 1 tablet by mouth daily       norethindrone (AYGESTIN) 5 MG tablet Take 1 tablet (5 mg) by mouth daily (Patient not taking: Reported on 6/9/2022) 30 tablet 3     diphenoxylate-atropine (LOMOTIL) 2.5-0.025 MG tablet Take 1 tablet by mouth 4 times daily as needed for diarrhea (Patient not taking: Reported on 6/9/2022) 16 tablet 0     ketoconazole (NIZORAL) 2 % external cream Apply to rash daily until  rash resolves, and with flares. 60 g 11     Allergies   Allergen Reactions     Nuts Shortness Of Breath     Pineapple Shortness Of Breath and Anaphylaxis     Pt. Allergic to pineapple     Fentanyl Itching     Vicodin [Hydrocodone-Acetaminophen] Nausea and Vomiting     Review of Systems   CONSTITUTIONAL:NEGATIVE for fever, chills, change in weight and POSITIVE  for fatigue  ENT/MOUTH: NEGATIVE for ear, mouth and throat problems  RESP:NEGATIVE for significant cough or SOB  CV: NEGATIVE for chest pain, palpitations or peripheral edema  GI: NEGATIVE for nausea, abdominal pain, heartburn, or change in bowel habits  : heavy menses bleeding   MUSCULOSKELETAL: POSITIVE  for back pain , muscle spasm  and radicular pain and NEGATIVE for joint swelling  and joint warmth   NEURO: NEGATIVE for weakness, dizziness or paresthesias  ENDOCRINE: NEGATIVE for temperature intolerance, skin/hair changes  HEME/ALLERGY/IMMUNE: NEGATIVE for bleeding problems      Objective    /72   Pulse 117   Temp 98.9  F (37.2  C) (Tympanic)   Resp 24   Wt 71.3 kg (157 lb 3.2 oz)   LMP 04/17/2022 (Exact Date)   SpO2 99%   BMI 25.37 kg/m    Body mass index is 25.37 kg/m .  Physical Exam   GENERAL: Patient is well nourished, well developed,in no apparent distress, non-toxic, in no respiratory distress and acyanotic, alert, cooperative and well hydrated  EYES: Eyes grossly normal to inspection and conjunctivae and sclerae normal  HENT:ear canals and TM's normal and nose and mouth without ulcers or lesions   NECK:normal and supple  CARDIAC:regular rates and rhythm and no murmur, click or rub  without LE edema bilaterally  RESP: normal respiratory rate and rhythm, lungs clear to auscultation  unlabored respirations, no intercostal retractions or accessory muscle use  ABD:soft, nontender  SKIN: Skin color, texture, turgor normal. No rashes or lesions.  MS: Patient appears to be in mild to moderate pain, antalgic gait noted. Lumbosacral spine  area reveals no local tenderness or mass.  Painful and reduced LS ROM noted. Straight leg raise is negative at 70 degrees on bilateral. DTR's, motor strength and sensation normal.  negative findings: no skin lesions, erythema, or scars, positive findings: limitation of motion - flexion: Marked and extension: Marked, paraspinal muscle spasm,general movements in the exam room are impaired   Lower extremity reflexes within normal limits bilaterally and Straight leg raise negative bilaterally  NEURO: mentation intact and speech normal  PSYCH: Alert, oriented, thought content appropriate,mentation appears normal., affect and mood normal      Results for orders placed or performed in visit on 06/09/22   CBC with platelets     Status: Normal   Result Value Ref Range    WBC Count 10.7 4.0 - 11.0 10e3/uL    RBC Count 4.78 3.80 - 5.20 10e6/uL    Hemoglobin 14.9 11.7 - 15.7 g/dL    Hematocrit 45.3 35.0 - 47.0 %    MCV 95 78 - 100 fL    MCH 31.2 26.5 - 33.0 pg    MCHC 32.9 31.5 - 36.5 g/dL    RDW 12.5 10.0 - 15.0 %    Platelet Count 318 150 - 450 10e3/uL   Comprehensive metabolic panel     Status: Normal   Result Value Ref Range    Sodium 136 133 - 144 mmol/L    Potassium 4.7 3.4 - 5.3 mmol/L    Chloride 103 94 - 109 mmol/L    Carbon Dioxide (CO2) 30 20 - 32 mmol/L    Anion Gap 3 3 - 14 mmol/L    Urea Nitrogen 14 7 - 30 mg/dL    Creatinine 0.87 0.52 - 1.04 mg/dL    Calcium 9.8 8.5 - 10.1 mg/dL    Glucose 95 70 - 99 mg/dL    Alkaline Phosphatase 68 40 - 150 U/L    AST 25 0 - 45 U/L    ALT 45 0 - 50 U/L    Protein Total 8.4 6.8 - 8.8 g/dL    Albumin 4.3 3.4 - 5.0 g/dL    Bilirubin Total 0.5 0.2 - 1.3 mg/dL    GFR Estimate >90 >60 mL/min/1.73m2   TSH with free T4 reflex     Status: Normal   Result Value Ref Range    TSH 0.98 0.40 - 4.00 mU/L   Ferritin     Status: Normal   Result Value Ref Range    Ferritin 49 12 - 150 ng/mL     Assessment & Plan     Low back pain radiating to left lower extremity  Will assess for disc impingement    - MR Lumbar Spine w/o Contrast  Will follow up and/or notify patient of  results via My Chart to determine further need for followup  Will Start:  - predniSONE (DELTASONE) 20 MG tablet  Dispense: 10 tablet; Refill: 0  - HYDROcodone-acetaminophen (NORCO) 5-325 MG tablet  Dispense: 10 tablet; Refill: 0    Radicular pain of left lower extremity  - MR Lumbar Spine w/o Contrast  - predniSONE (DELTASONE) 20 MG tablet  Dispense: 10 tablet; Refill: 0  - HYDROcodone-acetaminophen (NORCO) 5-325 MG tablet  Dispense: 10 tablet; Refill: 0    Fatigue, unspecified type  Will rule out anemia, electrolyte or thyroid issues   - CBC with platelets  - Comprehensive metabolic panel  - TSH with free T4 reflex  - Ferritin  -  Weight gain  - TSH with free T4 reflex    Excessive bleeding in premenopausal period  - US Pelvic Transabdominal and Transvaginal  Will follow up and/or notify patient of  results via My Chart to determine further need for followup  - CBC with platelets  - Ferritin  - CBC with platelets  - Ferritin    Screening for thyroid disorder  - TSH with free T4 reflex    Screening for disorder of blood and blood-forming organs  - CBC with platelets      Ordering of each unique test  Prescription drug management   Time spent doing chart review, history and exam, documentation and further activities per the note       See Patient Instructions  Patient Instructions     PLAN:   1.   Symptomatic therapy suggested: rest, increase fluids, and call prn if symptoms persist or worsen.  apply heat to affected area, apply ice to affected area, prescription for analgesic given, prescription for muscle relaxant, prescription for NSAID given    2.  Orders Placed This Encounter   Medications     predniSONE (DELTASONE) 20 MG tablet     Sig: Take 2 tablets (40 mg) by mouth daily for 5 days     Dispense:  10 tablet     Refill:  0     HYDROcodone-acetaminophen (NORCO) 5-325 MG tablet     Sig: Take 1 tablet by mouth every 6 hours as needed for  severe pain     Dispense:  10 tablet     Refill:  0     Orders Placed This Encounter   Procedures     REVIEW OF HEALTH MAINTENANCE PROTOCOL ORDERS     US Pelvic Transabdominal and Transvaginal     MR Lumbar Spine w/o Contrast     CBC with platelets     Comprehensive metabolic panel     TSH with free T4 reflex     Ferritin       3. Patient needs to follow up in if no improvement,or sooner if worsening of symptoms or other symptoms develop.  FURTHER TESTING:       - pelvic  ultrasound       - MRI lumbar   Will follow up and/or notify patient of  results via My Chart to determine further need for followup  FOLLOW UP WITH SPECIALIST :Gynecology      Return in about 1 month (around 7/9/2022), or if symptoms worsen or fail to improve.    KEL Goldstein CNP  [facility]

## 2022-06-09 NOTE — PROGRESS NOTES
Xiomara Doherty was evaluated in a specialty clinic today at which time her blood pressure was noted to be elevated.  She  has been advised to follow up with her primary provider or with a nurse only visit.
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
## 2022-06-09 NOTE — PATIENT INSTRUCTIONS
Tinea versicolor is on your chest. Try selsan blue body wash to treat and as prevention. You can also  the ketoconazole cream I sent to the pharmacy and use that 1-2 times daily until it goes away.

## 2022-06-10 NOTE — RESULT ENCOUNTER NOTE
Kasia Doherty,    Attached are your test results.  -Liver and gallbladder tests are normal (ALT,AST, Alk phos, bilirubin), kidney function is normal (Cr, GFR), sodium is normal, potassium is normal, calcium is normal, glucose is normal.  -TSH (thyroid stimulating hormone) level is normal which indicates normal thyroid function.  -Ferritin (iron) level is normal.   Please contact us if you have any questions.    Eneida Aviels, CNP

## 2022-06-15 ENCOUNTER — HOSPITAL ENCOUNTER (OUTPATIENT)
Dept: ULTRASOUND IMAGING | Facility: CLINIC | Age: 32
Discharge: HOME | End: 2022-06-15
Attending: NURSE PRACTITIONER
Payer: COMMERCIAL

## 2022-06-15 ENCOUNTER — HOSPITAL ENCOUNTER (OUTPATIENT)
Dept: MRI IMAGING | Facility: CLINIC | Age: 32
Discharge: HOME | End: 2022-06-15
Attending: NURSE PRACTITIONER
Payer: COMMERCIAL

## 2022-06-15 DIAGNOSIS — M54.50 LOW BACK PAIN RADIATING TO LEFT LOWER EXTREMITY: ICD-10-CM

## 2022-06-15 DIAGNOSIS — M54.10 RADICULAR PAIN OF LEFT LOWER EXTREMITY: ICD-10-CM

## 2022-06-15 DIAGNOSIS — M54.50 LOW BACK PAIN RADIATING TO LEFT LOWER EXTREMITY: Primary | ICD-10-CM

## 2022-06-15 DIAGNOSIS — M48.061 NEURAL FORAMINAL STENOSIS OF LUMBAR SPINE: ICD-10-CM

## 2022-06-15 DIAGNOSIS — M79.605 LOW BACK PAIN RADIATING TO LEFT LOWER EXTREMITY: ICD-10-CM

## 2022-06-15 DIAGNOSIS — M79.605 LOW BACK PAIN RADIATING TO LEFT LOWER EXTREMITY: Primary | ICD-10-CM

## 2022-06-15 DIAGNOSIS — N92.4 EXCESSIVE BLEEDING IN PREMENOPAUSAL PERIOD: ICD-10-CM

## 2022-06-15 PROCEDURE — 72148 MRI LUMBAR SPINE W/O DYE: CPT

## 2022-06-15 PROCEDURE — 76856 US EXAM PELVIC COMPLETE: CPT | Mod: 26 | Performed by: RADIOLOGY

## 2022-06-15 PROCEDURE — 76830 TRANSVAGINAL US NON-OB: CPT | Mod: 26 | Performed by: RADIOLOGY

## 2022-06-15 PROCEDURE — 72148 MRI LUMBAR SPINE W/O DYE: CPT | Mod: 26 | Performed by: RADIOLOGY

## 2022-06-15 PROCEDURE — 76856 US EXAM PELVIC COMPLETE: CPT

## 2022-06-16 NOTE — RESULT ENCOUNTER NOTE
Kasia Doherty,    Attached are your test results.  Normal pelvic US with IUD in place    Please contact us if you have any questions.    Eneida Aviles, CNP

## 2022-06-16 NOTE — RESULT ENCOUNTER NOTE
Kasia Doherty,    Attached are your test results.  Your MRI is normal but appear there is a slight increase  I think based on your symptoms we should go ahead and refer you to orthopedic and see what they recommend     Please call [phone] to make appointment  if you do not hear from referrals in the next few days.      Please contact us if you have any questions.    Eneida Aviles, CNP

## 2022-09-08 ENCOUNTER — TELEPHONE (OUTPATIENT)
Dept: CONSULT | Facility: CLINIC | Age: 32
End: 2022-09-08

## 2022-09-08 DIAGNOSIS — Z84.89 FAMILY HISTORY OF GENETIC DISEASE: Primary | ICD-10-CM
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
## 2022-10-09 ENCOUNTER — HEALTH MAINTENANCE LETTER (OUTPATIENT)
Age: 32
End: 2022-10-09

## 2022-10-13 ENCOUNTER — TELEPHONE (OUTPATIENT)
Dept: CONSULT | Facility: CLINIC | Age: 32
End: 2022-10-13

## 2022-10-13 ENCOUNTER — MYC MEDICAL ADVICE (OUTPATIENT)
Dept: FAMILY MEDICINE | Facility: CLINIC | Age: 32
End: 2022-10-13

## 2022-10-13 PROBLEM — Z15.89: Status: ACTIVE | Noted: 2022-10-13

## 2022-10-13 NOTE — TELEPHONE ENCOUNTER
Patient having some breast pain and she wants it to be evaluated.   Office appointment first?    Marcela Wang BSN, RN

## 2022-10-13 NOTE — TELEPHONE ENCOUNTER
Addendum 10/13/2022  Aleksandr returned my phone call and we reviewed the above information.  ---  I called Xiomara to discuss the results of genetic testing.  Our initial consultation occurred on 9/8/2022 where informed consent was obtained for genetic testing. She was not available and a non-detailed VM with contact information was left for Xiomara.    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 The VUS in PMP22 was not identified in Xiomara.        Personal History:   For additional details, review note on 9/8/2022 from myself.  To summarize, Xiomara does not have a history of hearing loss.    Family History:   A standard three generation pedigree has been obtained in the context of Xiomara's son, Cuong (MRN: [id number]). Xiomara's son has GJB2-related hearing loss.    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 If her reproductive partner is also a carrier for GJB2-related hearing loss (Cuong's father is), then there is a 1/4 or 25% chance to have an affected child, a 1/2 or 50% chance to have a child who is a carrier like Xiomara, and a 1/4 or 25% chance to have a child who is neither affected nor a carrier for GJB2-related hearing loss.    Plan:  1. Results mailed to Xiomara for her records.    Wendy Thomas MS Located within [facility]  Genetic Counselor  Email: [email or web address]  Phone: [phone]  Pager: [phone]

## 2022-11-28 ENCOUNTER — E-VISIT (OUTPATIENT)
Dept: FAMILY MEDICINE | Facility: CLINIC | Age: 32
End: 2022-11-28
Payer: COMMERCIAL

## 2022-11-28 ENCOUNTER — MYC MEDICAL ADVICE (OUTPATIENT)
Dept: FAMILY MEDICINE | Facility: CLINIC | Age: 32
End: 2022-11-28

## 2022-11-28 DIAGNOSIS — B96.89 ACUTE BACTERIAL SINUSITIS: Primary | ICD-10-CM

## 2022-11-28 DIAGNOSIS — J01.90 ACUTE BACTERIAL SINUSITIS: Primary | ICD-10-CM

## 2022-11-28 PROCEDURE — 99421 OL DIG E/M SVC 5-10 MIN: CPT | Performed by: NURSE PRACTITIONER

## 2022-11-28 NOTE — PATIENT INSTRUCTIONS
Sinusitis (Antibiotic Treatment)    The sinuses are air-filled spaces within the bones of the face. They connect to the inside of the nose. Sinusitis is an inflammation of the tissue that lines the sinuses. Sinusitis can occur during a cold. It can also happen due to allergies to pollens and other particles in the air. Sinusitis can cause symptoms of sinus congestion and a feeling of fullness. A sinus infection causes fever, headache, and facial pain. There is often green or yellow fluid draining from the nose or into the back of the throat (post-nasal drip). You have been given antibiotics to treat this condition.   Home care    Take the full course of antibiotics as instructed. Don't stop taking them, even when you feel better.    Drink plenty of water, hot tea, and other liquids as directed by the healthcare provider. This may help thin nasal mucus. It also may help your sinuses drain fluids.    Heat may help soothe painful areas of your face. Use a towel soaked in hot water. Or,  the shower and direct the warm spray onto your face. Using a vaporizer along with a menthol rub at night may also help soothe symptoms.     An expectorant with guaifenesin may help thin nasal mucus and help your sinuses drain fluids. Talk with your provider or pharmacists before taking an over-the-counter (OTC) medicine if you have any questions about it or its side effects..    You can use an OTC decongestant, unless a similar medicine was prescribed to you. Nasal sprays work the fastest. Use one that contains phenylephrine or oxymetazoline. First blow your nose gently. Then use the spray. Don't use these medicines more often than directed on the label. If you do, your symptoms may get worse. You may also take pills that contain pseudoephedrine. Don t use products that combine multiple medicines. This is because side effects may be increased. Read labels. You can also ask the pharmacist for help. (People with high blood  pressure should not use decongestants. They can raise blood pressure.) Talk with your provider or pharmacist if you have any questions about the medicine..    OTC antihistamines may help if allergies contributed to your sinusitis. Talk with your provider or pharmacist if you have any questions about the medicine..    Don't use nasal rinses or irrigation during an acute sinus infection, unless your healthcare provider tells you to. Rinsing may spread the infection to other areas in your sinuses.    Use acetaminophen or ibuprofen to control pain, unless another pain medicine was prescribed to you. If you have chronic liver or kidney disease or ever had a stomach ulcer, talk with your healthcare provider before using these medicines. Never give aspirin to anyone under age 18 who is ill with a fever. It may cause severe liver damage.    Don't smoke. This can make symptoms worse.    Follow-up care  Follow up with your healthcare provider, or as advised.   When to seek medical advice  Call your healthcare provider if any of these occur:     Facial pain or headache that gets worse    Stiff neck    Unusual drowsiness or confusion    Swelling of your forehead or eyelids    Symptoms don't go away in 10 days    Vision problems, such as blurred or double vision    Fever of 100.4 F (38 C) or higher, or as directed by your healthcare provider  Call 911  Call 911 if any of these occur:     Seizure    Trouble breathing    Feeling dizzy or faint    Fingernails, skin or lips look blue, purple , or gray  Prevention  Here are steps you can take to help prevent an infection:     Keep good hand washing habits.    Don t have close contact with people who have sore throats, colds, or other upper respiratory infections.    Don t smoke, and stay away from secondhand smoke.    Stay up to date with of your vaccines.  Float: [place] last reviewed this educational content on 12/1/2019 2000-2021 [employer]. All rights reserved. This  information is not intended as a substitute for professional medical care. Always follow your healthcare professional's instructions.        Dear Xiomara Doherty    After reviewing your responses, I've been able to diagnose you with sinus infectoin .      Based on your responses and diagnosis, I have prescribed No orders of the defined types were placed in this encounter.   to treat your symptoms. I have sent this to your pharmacy.?     It is also important to stay well hydrated, get lots of rest and take over-the-counter decongestants,?tylenol?or ibuprofen if you?are able to?take those medications per your primary care provider to help relieve discomfort.?     It is important that you take?all of?your prescribed medication even if your symptoms are improving after a few doses.? Taking?all of?your medicine helps prevent the symptoms from returning.?     If your symptoms worsen, you develop severe headache, vomiting, high fever (>102), or are not improving in 7 days, please contact your primary care provider for an appointment or visit any of our convenient Walk-in Care or Urgent Care Centers to be seen which can be found on our website?here.?     Thanks again for choosing?us?as your health care partner,?   ?  KEL Goldstein CNP?

## 2022-12-15 ENCOUNTER — E-VISIT (OUTPATIENT)
Dept: FAMILY MEDICINE | Facility: CLINIC | Age: 32
End: 2022-12-15
Payer: COMMERCIAL

## 2022-12-15 DIAGNOSIS — J06.9 UPPER RESPIRATORY TRACT INFECTION, UNSPECIFIED TYPE: Primary | ICD-10-CM

## 2022-12-15 PROCEDURE — 99421 OL DIG E/M SVC 5-10 MIN: CPT | Performed by: NURSE PRACTITIONER

## 2023-03-21 ENCOUNTER — MYC MEDICAL ADVICE (OUTPATIENT)
Dept: FAMILY MEDICINE | Facility: CLINIC | Age: 33
End: 2023-03-21
Payer: COMMERCIAL

## 2023-03-23 ENCOUNTER — E-VISIT (OUTPATIENT)
Dept: FAMILY MEDICINE | Facility: CLINIC | Age: 33
End: 2023-03-23
Payer: COMMERCIAL

## 2023-03-23 DIAGNOSIS — N39.0 ACUTE UTI (URINARY TRACT INFECTION): Primary | ICD-10-CM

## 2023-03-23 PROCEDURE — 99421 OL DIG E/M SVC 5-10 MIN: CPT | Performed by: NURSE PRACTITIONER

## 2023-03-23 RX ORDER — NITROFURANTOIN 25; 75 MG/1; MG/1
100 CAPSULE ORAL 2 TIMES DAILY
Qty: 10 CAPSULE | Refills: 0 | Status: SHIPPED | OUTPATIENT
Start: 2023-03-23 | End: 2023-03-24

## 2023-03-23 NOTE — PATIENT INSTRUCTIONS
Dear Xiomara Doherty    After reviewing your responses, I've been able to diagnose you with a urinary tract infection, which is a common infection of the bladder with bacteria.  This is not a sexually transmitted infection, though urinating immediately after intercourse can help prevent infections.  Drinking lots of fluids is also helpful to clear your current infection and prevent the next one.      I have sent a prescription for antibiotics to your pharmacy to treat this infection.    It is important that you take all of your prescribed medication even if your symptoms are improving after a few doses.  Taking all of your medicine helps prevent the symptoms from returning.     If your symptoms worsen, you develop pain in your back or stomach, develop fevers, or are not improving in 5 days, please contact your primary care provider for an appointment or visit any of our convenient Walk-in or Urgent Care Centers to be seen, which can be found on our website here.    Thanks again for choosing us as your health care partner,    KEL Goldstein CNP

## 2023-03-25 ENCOUNTER — HEALTH MAINTENANCE LETTER (OUTPATIENT)
Age: 33
End: 2023-03-25

## 2023-06-21 NOTE — TELEPHONE ENCOUNTER
SPINE PATIENTS - NEW PROTOCOL PREVISIT    RECORDS RECEIVED FROM: Internal   REASON FOR VISIT: low back pain   Date of Appt: 07/26/2023   NOTES (FOR ALL VISITS) STATUS DETAILS   OFFICE NOTE from referring provider Internal 06/16/2023 Dr Hernandez [facility]    OFFICE NOTE from other specialist Internal 06/15/2022 Eneida Aviles [facility]    DISCHARGE SUMMARY from hospital N/A    DISCHARGE REPORT from ER Care Everywhere 06/14/2023 [facility]    OPERATIVE REPORT N/A    EMG REPORT N/A    MEDICATION LIST N/A    IMAGING  (FOR ALL VISITS)     MRI (HEAD, NECK, SPINE) Internal 06/15/2022 lumbar spine  12/07/2020 lumbar spine   XRAY (SPINE) *NEUROSURGERY* N/A    CT (HEAD, NECK, SPINE) N/A

## 2023-07-13 NOTE — PROGRESS NOTES
"Xiomara is a 27 year old  who presents with difficulty conceiving x 12+ months.  Her menstrual periods are regular.  Her most recent form of birth control was abstinence, last used 12 months ago.  Previous infertility work up included: nothing.      Her partner Neptali is  The father of their 2 year old  He has had no change in his health         ROS: Ten point review of systems was reviewed and negative except the above.    PMH: Her past medical, surgical, and obstetric histories were reviewed and are documented in their appropriate chart areas.    ALL/Meds: Her medication and allergy histories were reviewed and are documented in their appropriate chart areas.    SH/FamHx: are reviewed and documented in the appropriate chart areas.    PE: /73  Pulse 76  Temp 97.5  F (36.4  C) (Oral)  Ht 5' 5.2\" (1.656 m)  Wt 136 lb 14.4 oz (62.1 kg)  LMP 03/15/2018  SpO2 98%  BMI 22.64 kg/m2  General Appearance:  healthy, alert, active, no distress  Discussed male and female factors of infertility.  I discussed the association of regular ovulation and regular menstruation.  Discussed possible testing including semen analysis, laboratory evaluation of ovulation, and evaluation of uterine/tubal anatomy.  It is very unlikely there is a new tubal or male factor affecting her fertility.  We will check to ensure she is ovulating.  She is given the opportunity to ask questions and have them answered.        A/P   27 year old   (N97.9) Female infertility  Comment: Out of 20 minutes spent face to face with the patient, > 50% of this was spent in consultation above the time spent on the annual.  Plan: Estradiol, Follicle stimulating hormone,         Lutropin, Prolactin, Testosterone Free and         Total, TSH with free T4 reflex, Progesterone in 2 weeks       Rafi Shaw MD FACOG   " Wife called wanting a refill of her husbands blood pressure meds. Med pended and pharmacy set.

## 2023-07-14 ENCOUNTER — VIRTUAL VISIT (OUTPATIENT)
Dept: FAMILY MEDICINE | Facility: CLINIC | Age: 33
End: 2023-07-14
Payer: COMMERCIAL

## 2023-07-14 DIAGNOSIS — M54.50 LOW BACK PAIN RADIATING TO BOTH LEGS: Primary | ICD-10-CM

## 2023-07-14 DIAGNOSIS — M79.604 LOW BACK PAIN RADIATING TO BOTH LEGS: Primary | ICD-10-CM

## 2023-07-14 DIAGNOSIS — M48.061 NEURAL FORAMINAL STENOSIS OF LUMBAR SPINE: ICD-10-CM

## 2023-07-14 DIAGNOSIS — M79.605 LOW BACK PAIN RADIATING TO BOTH LEGS: Primary | ICD-10-CM

## 2023-07-14 PROCEDURE — 99214 OFFICE O/P EST MOD 30 MIN: CPT | Mod: VID | Performed by: NURSE PRACTITIONER

## 2023-07-14 RX ORDER — OXYCODONE AND ACETAMINOPHEN 5; 325 MG/1; MG/1
1 TABLET ORAL EVERY 6 HOURS PRN
Qty: 12 TABLET | Refills: 0 | Status: SHIPPED | OUTPATIENT
Start: 2023-07-14 | End: 2023-10-06

## 2023-07-14 RX ORDER — CYCLOBENZAPRINE HCL 10 MG
10 TABLET ORAL 3 TIMES DAILY PRN
Qty: 30 TABLET | Refills: 1 | Status: SHIPPED | OUTPATIENT
Start: 2023-07-14 | End: 2023-10-06

## 2023-07-14 RX ORDER — NAPROXEN 500 MG/1
500 TABLET ORAL 2 TIMES DAILY PRN
Qty: 30 TABLET | Refills: 1 | Status: SHIPPED | OUTPATIENT
Start: 2023-07-14 | End: 2023-10-27

## 2023-07-14 RX ORDER — GABAPENTIN 300 MG/1
CAPSULE ORAL
Qty: 90 CAPSULE | Refills: 3 | Status: SHIPPED | OUTPATIENT
Start: 2023-07-14 | End: 2023-08-19

## 2023-07-14 RX ORDER — PREDNISONE 20 MG/1
40 TABLET ORAL DAILY
Qty: 10 TABLET | Refills: 0 | Status: CANCELLED | OUTPATIENT
Start: 2023-07-14 | End: 2023-07-19

## 2023-07-14 NOTE — PATIENT INSTRUCTIONS
PLAN:   1.   Symptomatic therapy suggested: apply heat to affected area, apply ice to affected area, referral to Orthopedics for this injury, prescription for analgesic given, prescription for muscle relaxant, prescription for NSAID given    2.  Orders Placed This Encounter   Medications    gabapentin (NEURONTIN) 300 MG capsule     Sig: Take 1 capsule (300 mg) by mouth daily for 3 days, THEN 1 capsule (300 mg) 2 times daily for 3 days, THEN 1 capsule (300 mg) 3 times daily for 30 days.     Dispense:  90 capsule     Refill:  3    naproxen (NAPROSYN) 500 MG tablet     Sig: Take 1 tablet (500 mg) by mouth 2 times daily as needed     Dispense:  30 tablet     Refill:  1    cyclobenzaprine (FLEXERIL) 10 MG tablet     Sig: Take 1 tablet (10 mg) by mouth 3 times daily as needed for muscle spasms     Dispense:  30 tablet     Refill:  1    oxyCODONE-acetaminophen (PERCOCET) 5-325 MG tablet     Sig: Take 1 tablet by mouth every 6 hours as needed for pain     Dispense:  12 tablet     Refill:  0     Orders Placed This Encounter   Procedures    REVIEW OF HEALTH MAINTENANCE PROTOCOL ORDERS    MR Lumbar Spine w/o Contrast       3. Patient needs to follow up in if no improvement,or sooner if worsening of symptoms or other symptoms develop.  FURTHER TESTING:       - MRI lumbar  FOLLOW UP WITH SPECIALIST :Orthopedics  Will follow up and/or notify patient of  results via My Chart to determine further need for followup

## 2023-07-14 NOTE — PROGRESS NOTES
Aleksandr is a 32 year old who is being evaluated via a billable video visit.      How would you like to obtain your AVS? MyChart  If the video visit is dropped, the invitation should be resent by: Text to cell phone: [phone]  Will anyone else be joining your video visit? No      Subjective   Aleksandr is a 32 year old, presenting for the following health issues:  Back Pain        7/14/2023     8:33 AM   Additional Questions   Roomed by Karen     History of Present Illness       Back Pain:  She presents for follow up of back pain. Patient's back pain is a recurring problem.  Location of back pain:  Right middle of back, left middle of back, right buttock and left buttock  Description of back pain: sharp and shooting  Back pain spreads: right buttocks and left buttocks    Since patient first noticed back pain, pain is: gradually worsening  Does back pain interfere with her job:  Yes      She eats 2-3 servings of fruits and vegetables daily.She consumes 0 sweetened beverage(s) daily.She exercises with enough effort to increase her heart rate 9 or less minutes per day.  She exercises with enough effort to increase her heart rate 3 or less days per week.   She is taking medications regularly.       Back Pain  Duration of complaint: about 6 weeks   Specific cause: was putting on underwear and felt her back pop   Has been doing stretching and rolling her back   Then has sharp shooting pain into both legs   Did an evisit and got a few pain medications and muscle relaxants  Went to see chiropractor   Can not lift anything or bend over   Did a short course of prednisone and some tizantidine   Description:   Location of pain: low back bilateral  Character of pain: sharp and dull ache  Pain radiation:radiates into the right leg and radiates into the left leg  Intensity: severe, 8 /10  Accompanying Signs & Symptoms:  Fever: no  Numbness or weakness in legs:  YES-   Dysuria or Hematuria: no  Bowel or bladder incontinence: no  History:    Any injury (lifting, bending, twisting): YES-   Work Injury: no  History of back problems: recurrent self limited episodes of low back pain in the past  Any previous MRI or X-rays: YES- MRI lumbar   Any history of back surgery: no  Any cancer history: no  Precipitating factors:   Worsened by: Lifting, Bending, Standing and Sitting.  Alleviating factors:  Improved by: nothing     Labs reviewed in EPIC  BP Readings from Last 3 Encounters:   06/09/22 122/87   06/09/22 118/72   04/28/22 128/77    Wt Readings from Last 3 Encounters:   06/09/22 71.3 kg (157 lb 3.2 oz)   04/28/22 69.6 kg (153 lb 6.4 oz)   02/14/22 66.2 kg (146 lb)                  Patient Active Problem List   Diagnosis     Menorrhagia     Back pain     Urinary frequency     Adjustment disorder with anxiety     Generalized anxiety disorder     Psoriasis     Esophageal reflux     New daily persistent headache     Post-concussion headache     Supervision of other normal pregnancy, antepartum     Need for rhogam due to Rh negative mother     Owen esophagus     Numbness and tingling     Ovarian cyst, left     Monoallelic mutation of GJB2 gene     Past Surgical History:   Procedure Laterality Date     ARTHROSCOPY KNEE  7/26/2011    Procedure:ARTHROSCOPY KNEE; diagnostic; Surgeon:VIOLETTA JIM; Location:[facility] OR     ARTHROSCOPY KNEE RT/LT  10/20/09    right knee     ARTHROSCOPY KNEE WITH RETINACULAR RELEASE  7/26/2011    Procedure:ARTHROSCOPY KNEE WITH RETINACULAR RELEASE; lateral release; Surgeon:VIOLETTA JIM; Location:[facility] OR     BIOPSY       COLONOSCOPY  06/21/12    [facility]     COLONOSCOPY WITH CO2 INSUFFLATION N/A 10/29/2014    Procedure: COLONOSCOPY WITH CO2 INSUFFLATION;  Surgeon: Duane, William Charles, MD;  Location:  OR     COMBINED ESOPHAGOSCOPY, GASTROSCOPY, DUODENOSCOPY (EGD) WITH CO2 INSUFFLATION N/A 4/24/2019    Procedure: ESOPHAGOGASTRODUODENOSCOPY, WITH CO2 INSUFFLATION;  Surgeon: Duane, William Charles, MD;  Location:  OR      ESOPHAGOSCOPY, GASTROSCOPY, DUODENOSCOPY (EGD), COMBINED  2/3/2011    COMBINED ESOPHAGOSCOPY, GASTROSCOPY, DUODENOSCOPY (EGD), BIOPSY SINGLE OR MULTIPLE performed by DINAH VALDEZ [facility]     ESOPHAGOSCOPY, GASTROSCOPY, DUODENOSCOPY (EGD), COMBINED N/A 10/29/2014    Procedure: COMBINED ESOPHAGOSCOPY, GASTROSCOPY, DUODENOSCOPY (EGD), BIOPSY SINGLE OR MULTIPLE;  Surgeon: Duane, William Charles, MD;  Location: [facility] OR     HC REMOVE TONSILS/ADENOIDS,<11 Y/O  1997     IRRIGATION AND DEBRIDEMENT KNEE, COMBINED  7/26/2011    Procedure:COMBINED IRRIGATION AND DEBRIDEMENT KNEE; dedridement; Surgeon:VIOLETTA JIM; Location:[facility] OR     SOFT TISSUE SURGERY       [facility] UGI ENDOSCOPY, SIMPLE EXAM  06/21/12    [facility]       Social History     Tobacco Use     Smoking status: Never     Smokeless tobacco: Never   Substance Use Topics     Alcohol use: Yes     Comment: rare     Family History   Problem Relation Age of Onset     Cancer Father         skin cancer on face     Hypertension Father      Hypertension Paternal Grandfather      Prostate Cancer Maternal Grandfather      Asthma Sister         sports         Current Outpatient Medications   Medication Sig Dispense Refill     acetaminophen (TYLENOL) 500 MG tablet Take 1,000 mg by mouth       cyclobenzaprine (FLEXERIL) 10 MG tablet Take 1 tablet (10 mg) by mouth 3 times daily as needed for muscle spasms 30 tablet 1     gabapentin (NEURONTIN) 300 MG capsule Take 1 capsule (300 mg) by mouth daily for 3 days, THEN 1 capsule (300 mg) 2 times daily for 3 days, THEN 1 capsule (300 mg) 3 times daily for 30 days. 90 capsule 3     ibuprofen (ADVIL/MOTRIN) 200 MG tablet        levonorgestrel (MIRENA) 20 MCG/DAY IUD 1 each (20 mcg) by Intrauterine route once       multivitamin w/minerals (THERA-VIT-M) tablet Take 1 tablet by mouth daily       naproxen (NAPROSYN) 500 MG tablet Take 1 tablet (500 mg) by mouth 2 times daily as needed 30 tablet 1     oxyCODONE-acetaminophen (PERCOCET) 5-325 MG  tablet Take 1 tablet by mouth every 6 hours as needed for pain 12 tablet 0     tiZANidine (ZANAFLEX) 4 MG tablet Take 1 tablet (4 mg) by mouth 3 times daily 30 tablet 1     Allergies   Allergen Reactions     Nuts Shortness Of Breath     Other reaction(s): Swelling  Peanuts, pecans, walnuts, cashews.  Tongue swells and is painful.     Pineapple Shortness Of Breath and Anaphylaxis     Pt. Allergic to pineapple     Fentanyl Itching     Vicodin [Hydrocodone-Acetaminophen] Hives     And vomiting       Review of Systems   Constitutional, HEENT, cardiovascular, pulmonary, gi and gu systems are negative, except as otherwise noted.      Objective           Vitals:  No vitals were obtained today due to virtual visit.    Physical Exam   GENERAL: alert, no distress and fatigued  EYES: Eyes grossly normal to inspection and conjunctivae and sclerae normal  HENT: normal cephalic/atraumatic, oropharynx clear and oral mucous membranes moist  RESP: No audible wheeze, cough, or visible cyanosis.  No visible retractions or increased work of breathing.    MS: extremities normal- no gross deformities noted  SKIN: Visible skin clear. No significant rash, abnormal pigmentation or lesions.  NEURO: mentation intact  PSYCH: Mentation appears normal, affect normal/bright, judgement and insight intact, normal speech and appearance well-groomed.    Pending orders and results     Assessment & Plan     Low back pain radiating to both legs  Symptomatic therapy suggested: .  apply heat to affected area, apply ice to affected area, prescription for analgesic given, prescription for muscle relaxant, prescription for NSAID given  Will Start:  - gabapentin (NEURONTIN) 300 MG capsule  Dispense: 90 capsule; Refill: 3  - naproxen (NAPROSYN) 500 MG tablet  Dispense: 30 tablet; Refill: 1  - cyclobenzaprine (FLEXERIL) 10 MG tablet  Dispense: 30 tablet; Refill: 1  - oxyCODONE-acetaminophen (PERCOCET) 5-325 MG tablet  Dispense: 12 tablet; Refill: 0  - MR Lumbar  Spine w/o Contrast  Will follow up and/or notify patient of  results via My Chart to determine further need for followup      Neural foraminal stenosis of lumbar spine  Will Start:  - gabapentin (NEURONTIN) 300 MG capsule  Dispense: 90 capsule; Refill: 3  - naproxen (NAPROSYN) 500 MG tablet  Dispense: 30 tablet; Refill: 1  - cyclobenzaprine (FLEXERIL) 10 MG tablet  Dispense: 30 tablet; Refill: 1  - oxyCODONE-acetaminophen (PERCOCET) 5-325 MG tablet  Dispense: 12 tablet; Refill: 0  - MR Lumbar Spine w/o Contrast      Review of prior external note(s) from [facility] information from [facility]  reviewed  Review of external notes as documented elsewhere in note  Ordering of each unique test  Prescription drug management   Time spent by me doing chart review, history and exam, documentation and further activities per the note       See Patient Instructions  Patient Instructions     PLAN:   1.   Symptomatic therapy suggested: apply heat to affected area, apply ice to affected area, referral to Orthopedics for this injury, prescription for analgesic given, prescription for muscle relaxant, prescription for NSAID given    2.  Orders Placed This Encounter   Medications     gabapentin (NEURONTIN) 300 MG capsule     Sig: Take 1 capsule (300 mg) by mouth daily for 3 days, THEN 1 capsule (300 mg) 2 times daily for 3 days, THEN 1 capsule (300 mg) 3 times daily for 30 days.     Dispense:  90 capsule     Refill:  3     naproxen (NAPROSYN) 500 MG tablet     Sig: Take 1 tablet (500 mg) by mouth 2 times daily as needed     Dispense:  30 tablet     Refill:  1     cyclobenzaprine (FLEXERIL) 10 MG tablet     Sig: Take 1 tablet (10 mg) by mouth 3 times daily as needed for muscle spasms     Dispense:  30 tablet     Refill:  1     oxyCODONE-acetaminophen (PERCOCET) 5-325 MG tablet     Sig: Take 1 tablet by mouth every 6 hours as needed for pain     Dispense:  12 tablet     Refill:  0     Orders Placed This Encounter    Procedures     REVIEW OF HEALTH MAINTENANCE PROTOCOL ORDERS     MR Lumbar Spine w/o Contrast       3. Patient needs to follow up in if no improvement,or sooner if worsening of symptoms or other symptoms develop.  FURTHER TESTING:       - MRI lumbar  FOLLOW UP WITH SPECIALIST :Orthopedics  Will follow up and/or notify patient of  results via My Chart to determine further need for followup            Appointments in Next Year    Jul 14, 2023  3:30 PM  (Arrive by 3:10 PM)  Provider Visit with KEL Goldstein CNP  [facility] ([facility] ) [phone]   Jul 20, 2023  2:30 PM  (Arrive by 2:15 PM)  Return Visit with KEL Minaya CNP  [facility] ([facility] ) [phone]   Jul 26, 2023  4:00 PM  NEW MED SPINE PHYSICIAN with Uche Guerrero MD  [facility] Neurosurgery [facility] ([facility]) [phone]          KEL Goldstein CNP  [facility]          Video-Visit Details    Type of service:  Video Visit   Video Start Time: 3:20  Video End Time:3:38 PM    Originating Location (pt. Location): Home  Distant Location (provider location):  On-site  Platform used for Video Visit: Shayan

## 2023-07-19 ENCOUNTER — HOSPITAL ENCOUNTER (OUTPATIENT)
Dept: MRI IMAGING | Facility: CLINIC | Age: 33
Discharge: HOME | End: 2023-07-19
Attending: NURSE PRACTITIONER | Admitting: NURSE PRACTITIONER
Payer: COMMERCIAL

## 2023-07-19 DIAGNOSIS — M54.50 LOW BACK PAIN RADIATING TO BOTH LEGS: ICD-10-CM

## 2023-07-19 DIAGNOSIS — M79.604 LOW BACK PAIN RADIATING TO BOTH LEGS: ICD-10-CM

## 2023-07-19 DIAGNOSIS — M79.605 LOW BACK PAIN RADIATING TO BOTH LEGS: ICD-10-CM

## 2023-07-19 DIAGNOSIS — M48.061 NEURAL FORAMINAL STENOSIS OF LUMBAR SPINE: ICD-10-CM

## 2023-07-19 PROCEDURE — 72148 MRI LUMBAR SPINE W/O DYE: CPT

## 2023-07-19 NOTE — RESULT ENCOUNTER NOTE
Kasia Doherty,    Attached are your test results.  MRI shows increase in disc protrusion and mild pinching occurring   Please keep appointment with specialist as planned    Please contact us if you have any questions.    Eneida Aviles, CNP

## 2023-07-20 NOTE — PROGRESS NOTES
"    SUBJECTIVE:  HPI:  Xiomara Doherty  Is a 32 year old female who presents today for new patient evaluation of low back pain upon referral from NATHAN Hernandez.  This patient went to the emergency department on 6/14/2023 and a record:  \"Xiomara Doherty is a 32 y.o. female who presents to the emergency department for evaluation of back pain. The patient began having low back pain last week. It began when she bent over while getting dressed and has been getting worse throughout the week. She has back pain whenever she moves and has shooting pain down both legs. She states that when she lifts her left leg she has pain in her right back and when she lifts her right leg she has left sided back pain. She has been having pain with urination secondary to the back pain. She endorses numbness in her toes. She denies saddle anesthesia or bowel bladder changes. She reports that back surgery was recommended in 2018 but she declined due to having a young child at home. 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 She took some leftover oxycodone and Flexeril without relief. She has also tried Epsom salt baths and heating pads. She denies any numbness in her groin, incontinence, UTI symptoms, or fever.\"  There is a cursory neurologic exam that looks to be boilerplate.  She was given IM Dilaudid and sent home with 60 mg prednisone tablets.    She had a follow-up virtual visit with NATHAN Hernandez on 6/16/2023, who recorded:  \"History of low back issues with recent exacerbation and significant pain interfering with daily life. She has had similar flares in the past, last occurred about one year ago. In the past she has had pain relief with percocet and tizanidine.   Discussed stopping robaxin. Switch to tizanidine   Use percocet sparingly for severe breakthrough pain only - cautious use. Side effects discussed.   Advised getting OTC lidocaine patches as well.   Alternate ice and " "heat.\"  The patient was reportedly interested in nonsurgical options.  Prescription for #12 Percocet 5 and tizanidine was given.    History today:  Aleksandr says she started seeing a chiropractor when she was \"a baby\" and she would go get adjustments when she would play sports but she really was not having any back pain until 6 years ago when she was pregnant and she turned over in bed and she and her  heard a large pop and she points to the midline in the lumbosacral junction as the site.  She has seen a chiropractor often since then for flares including the current time, she has been 6 to 7 weeks.  He has not been able to adjust her and he thinks that there is a problem in her SI joints and pelvis.  She understands that she has a problem with a pinched nerve at an L4-5 disc.  About 6 years ago she was told that she needs surgery but she declined and she did recover.  She has recurrences about 2 times a year but in between she has no symptoms.  Pain in the low back and knees lumbosacral midline and she has diffuse \"falling asleep\" sensation globally in both legs in a nondermatomal fashion.      SYMPTOMS WORSENED WITH sitting, laying down, bending over, left    SYMPTOMS IMPROVED WITH lying down with elevated legs, ice and heat    Pain score and diagram reviewed.  See questionnaire.      ROS: .  Otherwise negative for bowel/bladder dysfunction, balance changes, headache, leg pain/numbness/weakness, fevers, chills, night sweats, unexplained weight loss;  otherwise unremarkable.   See the patient's intake questionnaire from today for details.    Treatment to Date: Oxycodone, gabapentin, Flexeril, Naprosyn, Tylenol    MEDICATIONS:  Reviewed.    ALLERGIES:  Reviewed.     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      OBJECTIVE:    IMAGING: Images and reports reviewed.    MRI OF THE LUMBAR " SPINE WITHOUT CONTRAST 7/19/2023     COMPARISON: Lumbar spine MRI: 6/15/2022.                                                             IMPRESSION:     1.  Progressive increase in size of right paracentral disc protrusion  at L4-L5 which posteriorly displaces the descending right L5 nerve  roots and effaces the right subarticular zone. Mild right eccentric  spinal canal stenosis at this level is slightly increased from prior.  2.  Similar moderate bilateral neural foraminal stenosis at L4-L5.    EXAMINATION:    --CONSTITUTIONAL:   No acute distress.  The patient is well nourished and well groomed.  --SKIN:  Skin over the face, bilateral lower extremities, and posterior torso is clean, dry, intact without rashes.  Small scar over the medial right knee  --GAIT:  is non-antalgic. Flat foot, heel and toe walking:  normal   .  Squat and rise   normal    with increased pain on squatting in her posterior pelvis and low back.  --STANDING EXAMINATION:    Symmetry of spine/pelvis   unremarkable   .      Range of motion forward flexion hands to knees without pain.  Extension absent with severe pain.  Sidebending and rotation full with pain primarily with left sidebending and left rotation.   Standing flexion     positive left.    Ritu's sign   negative    .     Stork test   negative    .   --NEUROLOGICAL:    SENSATION to light touch is decreased in the a nterior aspect of her right knee but otherwise intact in bilateral thighs, lower legs and feet.   REFLEXES:  patellar 2+, and achilles 2+.  Babinski is negative. No clonus.  MANUAL MOTOR TESTING:  L3- S1 Myotomes, Femoral, Obturator, Peroneal and Tibial nerves 5/5 except for right knee pain that limits right knee extension with giveaway.  DURAL STRETCH TESTS:  SLR negative.  Femoral Stretch Test not done.   --PELVIC/HIP JOINTS:                Long Sitting      roll positive left long to short.    Hip scour   negative   .    Hip Impingement   positive left IR.   MILLIE     positive left.     Piriformis     positive left.   Spring testing negative but decreased compliance left SI.      PELVIC ALIGNMENT left inferior innominate shear.   --LUMBAR/GLUTEAL MUSCLES: Tenderness spasms or trigger point.    --VASCULAR:  Lower extremity capillary refill, temperature and color normal.       Procedure note-OMT:  Manual medicine restored normal pelvic alignment but she still had decreased compliance over the left SI joint so a treatment for presumed left posterior sacral torsion was done.  Afterwards leg lengths were even with negative long sitting test.  Less painful with left Arnaldo and Piriformis test.  Lumbar flexion increased hands to mid shins and lumbar extension increased but still 50% reduced.  She still had pain with the left sidebending but range was good.  Standing flexion test was negative    ASSESSMENT: Xiomara Doherty is a 32 year old female who presents  today for new patient evaluation of:    Pelvic Joint Dysfunction manifesting today as a left inferior innominate shear.  Her immediate response to OMT but she had a very dramatic pain reaction throughout her exam today  I suspect she has had recurrent Pelvic Joint Dysfunction which implicates pelvic instability  Neurologically intact with the exception of postoperative sensory deficit around the anterior right knee unrelated to her back  Minimal L4-5 lumbar disc disease with no clear indication of radiculopathy      PLAN:  Her pain response was so severe that I suspect she has significant inflammation over her left SI joint so Rosangela put her on a Medrol Dosepak to calm that down have her work with Eliot for pelvic stabilization PT.  Do the self-correction each morning that I taught her today.  Follow-up with me in 6 weeks.  I am going to recommend holding on chiropractic care for right now.  SI belt for 6 weeks whenever she is not lying down    Advised patient to call or return early if symptoms worsen, or having problems  controlling bladder and bowel function or worsening leg weakness.     Please note: Voice recognition software was used in this dictation.  It may therefore contain typographical errors.    Uche Guerrero MD

## 2023-07-26 ENCOUNTER — OFFICE VISIT (OUTPATIENT)
Dept: NEUROSURGERY | Facility: CLINIC | Age: 33
End: 2023-07-26
Attending: PHYSICIAN ASSISTANT
Payer: COMMERCIAL

## 2023-07-26 ENCOUNTER — PRE VISIT (OUTPATIENT)
Dept: NEUROSURGERY | Facility: CLINIC | Age: 33
End: 2023-07-26

## 2023-07-26 VITALS
HEIGHT: 65 IN | DIASTOLIC BLOOD PRESSURE: 89 MMHG | HEART RATE: 109 BPM | SYSTOLIC BLOOD PRESSURE: 123 MMHG | WEIGHT: 161 LBS | BODY MASS INDEX: 26.82 KG/M2

## 2023-07-26 DIAGNOSIS — M99.04 SACROILIAC JOINT SOMATIC DYSFUNCTION: Primary | ICD-10-CM

## 2023-07-26 DIAGNOSIS — M79.18 MYOFASCIAL PAIN: ICD-10-CM

## 2023-07-26 DIAGNOSIS — M25.359 INSTABILITY OF PELVIS OR THIGH JOINT: ICD-10-CM

## 2023-07-26 PROCEDURE — 98925 OSTEOPATH MANJ 1-2 REGIONS: CPT | Performed by: PREVENTIVE MEDICINE

## 2023-07-26 PROCEDURE — 99204 OFFICE O/P NEW MOD 45 MIN: CPT | Mod: 25 | Performed by: PREVENTIVE MEDICINE

## 2023-07-26 RX ORDER — METHYLPREDNISOLONE 4 MG
TABLET, DOSE PACK ORAL
Qty: 21 TABLET | Refills: 0 | Status: SHIPPED | OUTPATIENT
Start: 2023-07-26 | End: 2023-10-06

## 2023-07-26 ASSESSMENT — PAIN SCALES - GENERAL: PAINLEVEL: MODERATE PAIN (5)

## 2023-07-26 NOTE — PATIENT INSTRUCTIONS
"Aleksandr it is good to see you and I am sorry that today's treatment was so painful but I am pretty sure that were dealing with a Pelvic Joint Dysfunction so I want you to do some pelvic stabilization treatments with Eliot here and wear an SI belt for 6 weeks whenever you are not lying down.  Do some walking to help stabilize the pelvis and do the self-correction each morning as described below.  I do not think you have a serious problem without L4-5 disc.  See the assessment and plan below for further details of our discussion today    ASSESSMENT: Xiomara Doherty is a 32 year old female who presents  today for new patient evaluation of:    Pelvic Joint Dysfunction manifesting today as a left inferior innominate shear.  Her immediate response to OMT but she had a very dramatic pain reaction throughout her exam today  I suspect she has had recurrent Pelvic Joint Dysfunction which implicates pelvic instability  Neurologically intact with the exception of postoperative sensory deficit around the anterior right knee unrelated to her back  Minimal L4-5 lumbar disc disease with no clear indication of radiculopathy      PLAN:  Her pain response was so severe that I suspect she has significant inflammation over her left SI joint so Rosangela put her on a Medrol Dosepak to calm that down have her work with Eliot for pelvic stabilization PT.  Do the self-correction each morning that I taught her today.  Follow-up with me in 6 weeks.  I am going to recommend holding on chiropractic care for right now.  SI belt for 6 weeks whenever she is not lying down.          PELVIC JOINT SELF CORRECTION EXERCISES      It is best to do this first thing in the morning, and can be repeated once or twice during the day.    \"SHOTGUN\" TECHNIQUE:  This loosens up the front and back of the pelvis.  Do this before the Broomstick exercise.  Use on object such as a rectangular laundry basket.  Lie on your back with your knees bent, feet together and flat " on the floor.    Spread your knees approximately 12-24 inches around the outside of an upright laundry basket.  Squeeze your knees together, breathing as you do this.    Concentrate on keeping your buttocks relaxed and on the ground.    A brief discomfort in the front of the pelvis and even a popping sound is normal.  Hold the squeeze for 3-5 seconds.    Relax for 3-5 seconds.    Repeat 2 more times.    Now reverse your knee position to the inside of the upside down laundry basket while still lying with your knees bent up, feet on the ground.  Pull your knees apart, breathing easy as you do this.  Hold the squeeze for 3-5 seconds.    Relax for 3-5 seconds.    Repeat 2 more times.    BROOMSTICK EXERCISE:  Lie on your back with your knees bent.  Slide a broomstick or similar object above one knee, and below the opposite knee.  Firmly hold the stick with your hands will bringing your knees closed to your belly, preferably high enough that your back can rest flat on the ground.  Still holding the stick, scissors your legs against the stick, breathing as you do this.    (Push down to your foot with leg on top of the broomstick, and lift up to your chin with the leg below the broomstick).  Hold the squeeze for 3-5 seconds.    Relax for 3-5 seconds.    Repeat 2 more times.  Switch the position of the broomstick above the other knee, and below the first knee.  Repeat the exercise as above in this new position.

## 2023-07-26 NOTE — LETTER
"    7/26/2023         RE: Xiomara Doherty  [address] [facility] [address]        Dear Colleague,    Thank you for referring your patient, Xiomara Doherty, to the [facility] [place]. Please see a copy of my visit note below.        SUBJECTIVE:  HPI:  Xiomara Doherty  Is a 32 year old female who presents today for new patient evaluation of low back pain upon referral from NATHAN Hernandez.  This patient went to the emergency department on 6/14/2023 and a record:  \"Xiomara Doherty is a 32 y.o. female who presents to the emergency department for evaluation of back pain. The patient began having low back pain last week. It began when she bent over while getting dressed and has been getting worse throughout the week. She has back pain whenever she moves and has shooting pain down both legs. She states that when she lifts her left leg she has pain in her right back and when she lifts her right leg she has left sided back pain. She has been having pain with urination secondary to the back pain. She endorses numbness in her toes. She denies saddle anesthesia or bowel bladder changes. She reports that back surgery was recommended in 2018 but she declined due to having a young child at home. 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 She took some leftover oxycodone and Flexeril without relief. She has also tried Epsom salt baths and heating pads. She denies any numbness in her groin, incontinence, UTI symptoms, or fever.\"  There is a cursory neurologic exam that looks to be boilerplate.  She was given IM Dilaudid and sent home with 60 mg prednisone tablets.    She had a follow-up virtual visit with NATHAN Hernandez on 6/16/2023, who recorded:  \"History of low back issues with recent exacerbation and significant pain interfering with daily life. She has had similar flares in the past, last occurred about one year ago. In the past she has " "had pain relief with percocet and tizanidine.   Discussed stopping robaxin. Switch to tizanidine   Use percocet sparingly for severe breakthrough pain only - cautious use. Side effects discussed.   Advised getting OTC lidocaine patches as well.   Alternate ice and heat.\"  The patient was reportedly interested in nonsurgical options.  Prescription for #12 Percocet 5 and tizanidine was given.    History today:  Aleksandr says she started seeing a chiropractor when she was \"a baby\" and she would go get adjustments when she would play sports but she really was not having any back pain until 6 years ago when she was pregnant and she turned over in bed and she and her  heard a large pop and she points to the midline in the lumbosacral junction as the site.  She has seen a chiropractor often since then for flares including the current time, she has been 6 to 7 weeks.  He has not been able to adjust her and he thinks that there is a problem in her SI joints and pelvis.  She understands that she has a problem with a pinched nerve at an L4-5 disc.  About 6 years ago she was told that she needs surgery but she declined and she did recover.  She has recurrences about 2 times a year but in between she has no symptoms.  Pain in the low back and knees lumbosacral midline and she has diffuse \"falling asleep\" sensation globally in both legs in a nondermatomal fashion.      SYMPTOMS WORSENED WITH sitting, laying down, bending over, left    SYMPTOMS IMPROVED WITH lying down with elevated legs, ice and heat    Pain score and diagram reviewed.  See questionnaire.      ROS: .  Otherwise negative for bowel/bladder dysfunction, balance changes, headache, leg pain/numbness/weakness, fevers, chills, night sweats, unexplained weight loss;  otherwise unremarkable.   See the patient's intake questionnaire from today for details.    Treatment to Date: Oxycodone, gabapentin, Flexeril, Naprosyn, Tylenol    MEDICATIONS:  Reviewed.    ALLERGIES:  " Reviewed.     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      OBJECTIVE:    IMAGING: Images and reports reviewed.    MRI OF THE LUMBAR SPINE WITHOUT CONTRAST 7/19/2023     COMPARISON: Lumbar spine MRI: 6/15/2022.                                                             IMPRESSION:     1.  Progressive increase in size of right paracentral disc protrusion  at L4-L5 which posteriorly displaces the descending right L5 nerve  roots and effaces the right subarticular zone. Mild right eccentric  spinal canal stenosis at this level is slightly increased from prior.  2.  Similar moderate bilateral neural foraminal stenosis at L4-L5.    EXAMINATION:    --CONSTITUTIONAL:   No acute distress.  The patient is well nourished and well groomed.  --SKIN:  Skin over the face, bilateral lower extremities, and posterior torso is clean, dry, intact without rashes.  Small scar over the medial right knee  --GAIT:  is non-antalgic. Flat foot, heel and toe walking:  normal   .  Squat and rise   normal    with increased pain on squatting in her posterior pelvis and low back.  --STANDING EXAMINATION:    Symmetry of spine/pelvis   unremarkable   .      Range of motion forward flexion hands to knees without pain.  Extension absent with severe pain.  Sidebending and rotation full with pain primarily with left sidebending and left rotation.   Standing flexion     positive left.    Ritu's sign   negative    .     Stork test   negative    .   --NEUROLOGICAL:    SENSATION to light touch is decreased in the a nterior aspect of her right knee but otherwise intact in bilateral thighs, lower legs and feet.   REFLEXES:  patellar 2+, and achilles 2+.  Babinski is negative. No clonus.  MANUAL MOTOR TESTING:  L3- S1 Myotomes, Femoral, Obturator, Peroneal and Tibial nerves 5/5 except for right knee pain that limits right knee  extension with giveaway.  DURAL STRETCH TESTS:  SLR negative.  Femoral Stretch Test not done.   --PELVIC/HIP JOINTS:                Long Sitting      roll positive left long to short.    Hip scour   negative   .    Hip Impingement   positive left IR.   MILLIE    positive left.     Piriformis     positive left.   Spring testing negative but decreased compliance left SI.      PELVIC ALIGNMENT left inferior innominate shear.   --LUMBAR/GLUTEAL MUSCLES: Tenderness spasms or trigger point.    --VASCULAR:  Lower extremity capillary refill, temperature and color normal.       Procedure note-OMT:  Manual medicine restored normal pelvic alignment but she still had decreased compliance over the left SI joint so a treatment for presumed left posterior sacral torsion was done.  Afterwards leg lengths were even with negative long sitting test.  Less painful with left Millie and Piriformis test.  Lumbar flexion increased hands to mid shins and lumbar extension increased but still 50% reduced.  She still had pain with the left sidebending but range was good.  Standing flexion test was negative    ASSESSMENT: Xiomara Doherty is a 32 year old female who presents  today for new patient evaluation of:    Pelvic Joint Dysfunction manifesting today as a left inferior innominate shear.  Her immediate response to OMT but she had a very dramatic pain reaction throughout her exam today  I suspect she has had recurrent Pelvic Joint Dysfunction which implicates pelvic instability  Neurologically intact with the exception of postoperative sensory deficit around the anterior right knee unrelated to her back  Minimal L4-5 lumbar disc disease with no clear indication of radiculopathy      PLAN:  Her pain response was so severe that I suspect she has significant inflammation over her left SI joint so Rosangela put her on a Medrol Dosepak to calm that down have her work with Eliot for pelvic stabilization PT.  Do the self-correction each morning that  I taught her today.  Follow-up with me in 6 weeks.  I am going to recommend holding on chiropractic care for right now.  SI belt for 6 weeks whenever she is not lying down    Advised patient to call or return early if symptoms worsen, or having problems controlling bladder and bowel function or worsening leg weakness.     Please note: Voice recognition software was used in this dictation.  It may therefore contain typographical errors.    Uche Guerrero MD             Again, thank you for allowing me to participate in the care of your patient.        Sincerely,        Uche Guerrero MD

## 2023-07-26 NOTE — NURSING NOTE
"Reason For Visit:   Chief Complaint   Patient presents with    Consult     Low back pain         Occupation: stay at home mom  Currently working? No.  Work status?   Stay at home home .    Sports: Volleyball  Activities: walking             /89   Pulse 109   Ht 1.651 m (5' 5\")   Wt 73 kg (161 lb)   LMP  (LMP Unknown)   BMI 26.79 kg/m        Allergies   Allergen Reactions    Nuts Shortness Of Breath     Other reaction(s): Swelling  Peanuts, pecans, walnuts, cashews.  Tongue swells and is painful.    Pineapple Shortness Of Breath and Anaphylaxis     Pt. Allergic to pineapple    Fentanyl Itching    Vicodin [Hydrocodone-Acetaminophen] Hives     And vomiting       Current Outpatient Medications   Medication    acetaminophen (TYLENOL) 500 MG tablet    cyclobenzaprine (FLEXERIL) 10 MG tablet    gabapentin (NEURONTIN) 300 MG capsule    ibuprofen (ADVIL/MOTRIN) 200 MG tablet    levonorgestrel (MIRENA) 20 MCG/DAY IUD    multivitamin w/minerals (THERA-VIT-M) tablet    naproxen (NAPROSYN) 500 MG tablet    oxyCODONE-acetaminophen (PERCOCET) 5-325 MG tablet    tiZANidine (ZANAFLEX) 4 MG tablet     No current facility-administered medications for this visit.         Darla Severin-Brown, LPN   "

## 2023-10-06 ENCOUNTER — VIRTUAL VISIT (OUTPATIENT)
Dept: FAMILY MEDICINE | Facility: CLINIC | Age: 33
End: 2023-10-06
Payer: COMMERCIAL

## 2023-10-06 DIAGNOSIS — Z13.29 SCREENING FOR THYROID DISORDER: ICD-10-CM

## 2023-10-06 DIAGNOSIS — M48.061 NEURAL FORAMINAL STENOSIS OF LUMBAR SPINE: ICD-10-CM

## 2023-10-06 DIAGNOSIS — M79.604 LOW BACK PAIN RADIATING TO BOTH LEGS: ICD-10-CM

## 2023-10-06 DIAGNOSIS — Z13.6 CARDIOVASCULAR SCREENING; LDL GOAL LESS THAN 160: ICD-10-CM

## 2023-10-06 DIAGNOSIS — K44.9 HIATAL HERNIA: ICD-10-CM

## 2023-10-06 DIAGNOSIS — K22.70 BARRETT'S ESOPHAGUS WITHOUT DYSPLASIA: ICD-10-CM

## 2023-10-06 DIAGNOSIS — R13.19 ESOPHAGEAL DYSPHAGIA: ICD-10-CM

## 2023-10-06 DIAGNOSIS — K21.9 GASTROESOPHAGEAL REFLUX DISEASE WITHOUT ESOPHAGITIS: Primary | Chronic | ICD-10-CM

## 2023-10-06 DIAGNOSIS — M79.605 LOW BACK PAIN RADIATING TO BOTH LEGS: ICD-10-CM

## 2023-10-06 DIAGNOSIS — M54.50 LOW BACK PAIN RADIATING TO BOTH LEGS: ICD-10-CM

## 2023-10-06 DIAGNOSIS — Z13.1 SCREENING FOR DIABETES MELLITUS (DM): ICD-10-CM

## 2023-10-06 DIAGNOSIS — Z13.0 SCREENING FOR DISORDER OF BLOOD AND BLOOD-FORMING ORGANS: ICD-10-CM

## 2023-10-06 PROCEDURE — 99214 OFFICE O/P EST MOD 30 MIN: CPT | Mod: VID | Performed by: NURSE PRACTITIONER

## 2023-10-06 RX ORDER — CYCLOBENZAPRINE HCL 10 MG
10 TABLET ORAL 3 TIMES DAILY PRN
Qty: 30 TABLET | Refills: 1 | Status: SHIPPED | OUTPATIENT
Start: 2023-10-06

## 2023-10-06 RX ORDER — PANTOPRAZOLE SODIUM 40 MG/1
40 TABLET, DELAYED RELEASE ORAL DAILY
Qty: 30 TABLET | Refills: 1 | Status: SHIPPED | OUTPATIENT
Start: 2023-10-06 | End: 2024-01-06

## 2023-10-06 RX ORDER — OXYCODONE AND ACETAMINOPHEN 5; 325 MG/1; MG/1
1 TABLET ORAL EVERY 6 HOURS PRN
Qty: 12 TABLET | Refills: 0 | Status: SHIPPED | OUTPATIENT
Start: 2023-10-06

## 2023-10-06 RX ORDER — SUCRALFATE 1 G/1
1 TABLET ORAL 4 TIMES DAILY
Qty: 120 TABLET | Refills: 1 | Status: SHIPPED | OUTPATIENT
Start: 2023-10-06

## 2023-10-06 NOTE — PROGRESS NOTES
Aleksandr is a 32 year old who is being evaluated via a billable video visit.      How would you like to obtain your AVS? MyChart  If the video visit is dropped, the invitation should be resent by: Text to cell phone: [phone]  Will anyone else be joining your video visit? No          Subjective   Aleksandr is a 32 year old, presenting for the following health issues:  Gastrophageal Reflux        7/14/2023     8:33 AM   Additional Questions   Roomed by Karen       History of Present Illness       Reason for visit:  Acid reflux    She eats 0-1 servings of fruits and vegetables daily.She consumes 0 sweetened beverage(s) daily.She exercises with enough effort to increase her heart rate 60 or more minutes per day.  She exercises with enough effort to increase her heart rate 7 days per week.   She is taking medications regularly.       Medication Followup of Refill Cyclobenzaprine & Oxycodone   Taking Medication as prescribed: yes  Side Effects:  None  Medication Helping Symptoms:  yes    Saw Dr Guerrero in July related to her SI joint pain and was referred to PT at that time  Needs to make follow up with Dr Guerrero   Also needs to make an appointment PT as well as did not do that as well yet     Concern - Acid Reflux   Onset: Years   Description: Burning, foaming,   Intensity: mild, moderate, severe  Progression of Symptoms:  worsening, same, and constant  Accompanying Signs & Symptoms: Burning, foaming, burping and bowel movements   Previous history of similar problem: yes  Precipitating factors:        Worsened by: Cold water, Foods   Alleviating factors:        Improved by: nothing need some type of medication   Therapies tried and outcome: multiple OTC     Description of symptoms:  acid back wash   food getting stuck: YES-   nausea/vomiting: YES- everyday   abdominal pain: YES  black/tarry or bloody stools: no :  worse with fatty foods.  current NSAID/Aspirin use: YES- some Naprosyn for her back   Therapies tried and outcome:  Omeprazole (Prilosec), Prevacid, and Zantac (Ranitidine)  Also has noticed a lot of weight gain in the last 6 months   Rare alcohol but Naprosyn about almost every day to every couple days     Labs reviewed in EPIC  BP Readings from Last 3 Encounters:   07/26/23 123/89   06/09/22 122/87   06/09/22 118/72    Wt Readings from Last 3 Encounters:   07/26/23 73 kg (161 lb)   06/09/22 71.3 kg (157 lb 3.2 oz)   04/28/22 69.6 kg (153 lb 6.4 oz)                  Patient Active Problem List   Diagnosis    Menorrhagia    Back pain    Urinary frequency    Adjustment disorder with anxiety    Generalized anxiety disorder    Psoriasis    Esophageal reflux    New daily persistent headache    Post-concussion headache    Supervision of other normal pregnancy, antepartum    Need for rhogam due to Rh negative mother    Owen esophagus    Numbness and tingling    Ovarian cyst, left    Monoallelic mutation of GJB2 gene     Past Surgical History:   Procedure Laterality Date    ARTHROSCOPY KNEE  7/26/2011    Procedure:ARTHROSCOPY KNEE; diagnostic; Surgeon:VIOLETTA JIM; Location:[facility]    ARTHROSCOPY KNEE RT/LT  10/20/09    right knee    ARTHROSCOPY KNEE WITH RETINACULAR RELEASE  7/26/2011    Procedure:ARTHROSCOPY KNEE WITH RETINACULAR RELEASE; lateral release; Surgeon:VIOLETTA JIM; Location:[facility]    BIOPSY      COLONOSCOPY  06/21/12    [facility]    COLONOSCOPY WITH CO2 INSUFFLATION N/A 10/29/2014    Procedure: COLONOSCOPY WITH CO2 INSUFFLATION;  Surgeon: Duane, William Charles, MD;  Location: [facility]    COMBINED ESOPHAGOSCOPY, GASTROSCOPY, DUODENOSCOPY (EGD) WITH CO2 INSUFFLATION N/A 4/24/2019    Procedure: ESOPHAGOGASTRODUODENOSCOPY, WITH CO2 INSUFFLATION;  Surgeon: Duane, William Charles, MD;  Location: [facility]    ESOPHAGOSCOPY, GASTROSCOPY, DUODENOSCOPY (EGD), COMBINED  2/3/2011    COMBINED ESOPHAGOSCOPY, GASTROSCOPY, DUODENOSCOPY (EGD), BIOPSY SINGLE OR MULTIPLE performed by DINAH VALDEZ at  GI    ESOPHAGOSCOPY, GASTROSCOPY,  DUODENOSCOPY (EGD), COMBINED N/A 10/29/2014    Procedure: COMBINED ESOPHAGOSCOPY, GASTROSCOPY, DUODENOSCOPY (EGD), BIOPSY SINGLE OR MULTIPLE;  Surgeon: Duane, William Charles, MD;  Location: [facility] OR    HC REMOVE TONSILS/ADENOIDS,<11 Y/O  1997    IRRIGATION AND DEBRIDEMENT KNEE, COMBINED  7/26/2011    Procedure:COMBINED IRRIGATION AND DEBRIDEMENT KNEE; dedridement; Surgeon:VIOLETTA JIM; Location:[facility] OR    SOFT TISSUE SURGERY      [facility]GI ENDOSCOPY, SIMPLE EXAM  06/21/12    [facility]       Social History     Tobacco Use    Smoking status: Never     Passive exposure: Never    Smokeless tobacco: Never   Substance Use Topics    Alcohol use: Yes     Comment: rare     Family History   Problem Relation Age of Onset    Cancer Father         skin cancer on face    Hypertension Father     Hypertension Paternal Grandfather     Prostate Cancer Maternal Grandfather     Asthma Sister         sports         Current Outpatient Medications   Medication Sig Dispense Refill    acetaminophen (TYLENOL) 500 MG tablet Take 1,000 mg by mouth      cyclobenzaprine (FLEXERIL) 10 MG tablet Take 1 tablet (10 mg) by mouth 3 times daily as needed for muscle spasms 30 tablet 1    ibuprofen (ADVIL/MOTRIN) 200 MG tablet       levonorgestrel (MIRENA) 20 MCG/DAY IUD 1 each (20 mcg) by Intrauterine route once      methylPREDNISolone (MEDROL DOSEPAK) 4 MG tablet therapy pack Follow Package Directions 21 tablet 0    multivitamin w/minerals (THERA-VIT-M) tablet Take 1 tablet by mouth daily      naproxen (NAPROSYN) 500 MG tablet Take 1 tablet (500 mg) by mouth 2 times daily as needed 30 tablet 1    oxyCODONE-acetaminophen (PERCOCET) 5-325 MG tablet Take 1 tablet by mouth every 6 hours as needed for pain 12 tablet 0    tiZANidine (ZANAFLEX) 4 MG tablet Take 1 tablet (4 mg) by mouth 3 times daily 30 tablet 1    gabapentin (NEURONTIN) 300 MG capsule Take 1 capsule (300 mg) by mouth daily for 3 days, THEN 1 capsule (300 mg) 2 times daily for 3 days, THEN 1  capsule (300 mg) 3 times daily for 30 days. 90 capsule 3     Allergies   Allergen Reactions    Nuts Shortness Of Breath     Other reaction(s): Swelling  Peanuts, pecans, walnuts, cashews.  Tongue swells and is painful.    Pineapple Shortness Of Breath and Anaphylaxis     Pt. Allergic to pineapple    Fentanyl Itching    Vicodin [Hydrocodone-Acetaminophen] Hives     And vomiting     Review of Systems   Constitutional, HEENT, cardiovascular, pulmonary, GI, , musculoskeletal, neuro, skin, endocrine and psych systems are negative, except as otherwise noted.      Objective     No      Vitals:  No vitals were obtained today due to virtual visit.    Physical Exam   GENERAL: alert and no distress  EYES: Eyes grossly normal to inspection and conjunctivae and sclerae normal  HENT: normal cephalic/atraumatic and oral mucous membranes moist  RESP: No audible wheeze, cough, or visible cyanosis.  No visible retractions or increased work of breathing.    MS: No gross musculoskeletal defects noted.  Normal range of motion.  No visible edema.  SKIN: Visible skin clear. No significant rash, abnormal pigmentation or lesions.  NEURO: mentation intact  PSYCH: Mentation appears normal, affect normal/bright, judgement and insight intact, normal speech and appearance well-groomed.  Pending orders and results       Assessment & Plan     Gastroesophageal reflux disease without esophagitis  Discussed that these symptoms is likely related to reflux or GERD. Discussed non-pharmacologic treatment, including elevating the head of bed, decrease food before bedtime and decreased caffeine and nicotine and alcohol.  Went over meds for reflux. Pt will try Protonix . Recheck if not improving as expected.  - Adult GI  Referral - Procedure Only  Will Start:  - pantoprazole (PROTONIX) 40 MG EC tablet  Dispense: 30 tablet; Refill: 1  - Adult GI  Referral - Consult Only  - sucralfate (CARAFATE) 1 GM tablet  Dispense: 120 tablet; Refill:  1    Owen's esophagus without dysplasia  - Adult GI  Referral - Procedure Only  Will Start:  - pantoprazole (PROTONIX) 40 MG EC tablet  Dispense: 30 tablet; Refill: 1  - Adult GI  Referral - Consult Only  - sucralfate (CARAFATE) 1 GM tablet  Dispense: 120 tablet; Refill: 1    Esophageal dysphagia  - Adult GI  Referral - Procedure Only  - pantoprazole (PROTONIX) 40 MG EC tablet  Dispense: 30 tablet; Refill: 1  - Adult GI  Referral - Consult Only  - sucralfate (CARAFATE) 1 GM tablet  Dispense: 120 tablet; Refill: 1    Hiatal hernia  - Adult GI  Referral - Procedure Only  - pantoprazole (PROTONIX) 40 MG EC tablet  Dispense: 30 tablet; Refill: 1  - Adult GI  Referral - Consult Only    Low back pain radiating to both legs  Refill completed.   - oxyCODONE-acetaminophen (PERCOCET) 5-325 MG tablet  Dispense: 12 tablet; Refill: 0  - cyclobenzaprine (FLEXERIL) 10 MG tablet  Dispense: 30 tablet; Refill: 1    Neural foraminal stenosis of lumbar spine  FOLLOW UP WITH SPECIALIST :Orthopedics    - oxyCODONE-acetaminophen (PERCOCET) 5-325 MG tablet  Dispense: 12 tablet; Refill: 0  - cyclobenzaprine (FLEXERIL) 10 MG tablet  Dispense: 30 tablet; Refill: 1    CARDIOVASCULAR SCREENING; LDL GOAL LESS THAN 160  - Lipid panel reflex to direct LDL Fasting    Screening for diabetes mellitus (DM)  - Comprehensive metabolic panel    Screening for thyroid disorder  - TSH with free T4 reflex    Screening for disorder of blood and blood-forming organs  - CBC with platelets  PLAN:   CONSULTATION/REFERRAL to Gastroenterology and endoscopy   Follow up appointment with back specialist   FUTURE LABS:       - Schedule a fasting blood draw   Will follow up and/or notify patient of  results via My Chart to determine further need for followup   Patient needs to follow up in if no improvement,or sooner if worsening of symptoms or other symptoms develop.    Ordering of each unique test  Prescription  "drug management   Time spent by me doing chart review, history and exam, documentation and further activities per the note       BMI:   Estimated body mass index is 26.79 kg/m  as calculated from the following:    Height as of 7/26/23: 1.651 m (5' 5\").    Weight as of 7/26/23: 73 kg (161 lb).       See Patient Instructions  Patient Instructions   PLAN:   1.   Symptomatic therapy suggested: will start protonix once a day and Carfate 4 times a day.    2.  Orders Placed This Encounter   Medications    oxyCODONE-acetaminophen (PERCOCET) 5-325 MG tablet     Sig: Take 1 tablet by mouth every 6 hours as needed for pain     Dispense:  12 tablet     Refill:  0    cyclobenzaprine (FLEXERIL) 10 MG tablet     Sig: Take 1 tablet (10 mg) by mouth 3 times daily as needed for muscle spasms     Dispense:  30 tablet     Refill:  1    pantoprazole (PROTONIX) 40 MG EC tablet     Sig: Take 1 tablet (40 mg) by mouth daily     Dispense:  30 tablet     Refill:  1    sucralfate (CARAFATE) 1 GM tablet     Sig: Take 1 tablet (1 g) by mouth 4 times daily     Dispense:  120 tablet     Refill:  1     Orders Placed This Encounter   Procedures    Lipid panel reflex to direct LDL Fasting    CBC with platelets    Comprehensive metabolic panel    TSH with free T4 reflex    Adult GI  Referral - Procedure Only    Adult GI  Referral - Consult Only     3.  CONSULTATION/REFERRAL to Gastroenterology and endoscopy   Follow up appointment with back specialist   FUTURE LABS:       - Schedule a fasting blood draw   Will follow up and/or notify patient of  results via My Chart to determine further need for followup   Patient needs to follow up in if no improvement,or sooner if worsening of symptoms or other symptoms develop.          KEL Goldstein [facility]          Video-Visit Details    Type of service:  Video Visit   Video Start Time: 2:34PM  Video End Time:2:55 PM    Originating Location (pt. " Location): Home    Distant Location (provider location):  On-site  Platform used for Video Visit: Shayan

## 2023-10-06 NOTE — PATIENT INSTRUCTIONS
PLAN:   1.   Symptomatic therapy suggested: will start protonix once a day and Carfate 4 times a day.    2.  Orders Placed This Encounter   Medications    oxyCODONE-acetaminophen (PERCOCET) 5-325 MG tablet     Sig: Take 1 tablet by mouth every 6 hours as needed for pain     Dispense:  12 tablet     Refill:  0    cyclobenzaprine (FLEXERIL) 10 MG tablet     Sig: Take 1 tablet (10 mg) by mouth 3 times daily as needed for muscle spasms     Dispense:  30 tablet     Refill:  1    pantoprazole (PROTONIX) 40 MG EC tablet     Sig: Take 1 tablet (40 mg) by mouth daily     Dispense:  30 tablet     Refill:  1    sucralfate (CARAFATE) 1 GM tablet     Sig: Take 1 tablet (1 g) by mouth 4 times daily     Dispense:  120 tablet     Refill:  1     Orders Placed This Encounter   Procedures    Lipid panel reflex to direct LDL Fasting    CBC with platelets    Comprehensive metabolic panel    TSH with free T4 reflex    Adult GI  Referral - Procedure Only    Adult GI  Referral - Consult Only     3.  CONSULTATION/REFERRAL to Gastroenterology and endoscopy   Follow up appointment with back specialist   FUTURE LABS:       - Schedule a fasting blood draw   Will follow up and/or notify patient of  results via My Chart to determine further need for followup   Patient needs to follow up in if no improvement,or sooner if worsening of symptoms or other symptoms develop.

## 2023-10-06 NOTE — COMMUNITY RESOURCES LIST (ENGLISH)
10/06/2023   [facility]  N/A  For questions about this resource list or additional care needs, please contact your primary care clinic or care manager.  Phone: [phone]   Email: N/A   Address: [address] [place] [address]   Hours: N/A        Financial Stability       Utility payment assistance  1  [facility] ([facility]) - Emergency Assistance Distance: 3.53 miles      In-Person   [address] [place] [address]  Language: English, Nauruan  Hours: Mon 10:00 AM - 1:30 PM Appt. Only, Wed 10:00 AM - 1:30 PM Appt. Only, Thu 4:30 PM - 6:30 PM Appt. Only, Fri 10:00 AM - 1:30 PM Appt. Only  Fees: Free   Phone: (335) 399-5240 Email: info@[employer] Website: http://www.[employer]     2  [facility] Distance: 12.19 miles      In-Person   [address] [place] [address]  Language: English  Hours: Mon - Fri 8:00 AM - 12:00 PM , Mon - Fri 1:00 PM - 4:30 PM  Fees: Free, Self Pay   Phone: (966) 751-4018 Email: [email or web address]@[email or web address] Website: https://[employer].[facility]/          Food and Nutrition       Food pantry  3  [facility] ([facility]) Distance: 3.53 miles      Pickup   [address] [place] [address]  Language: English, Nauruan  Hours: Mon 10:00 AM - 1:30 PM Appt. Only, Wed 10:00 AM - 1:30 PM Appt. Only, Thu 4:30 PM - 6:00 PM Appt. Only, Fri 10:00 AM - 12:00 PM  Fees: Free   Phone: (441) 293-2958 Email: info@[employer].org Website: http://www.[employer].org     4  [facility] Distance: 8.04 miles      In-Person, Pickup, Phone/Virtual   [address] [place] [address]  Language: English  Hours: Mon 5:30 PM - 7:30 PM Appt. Only, Tue 11:00 AM - 12:30 PM , Wed 9:00 AM - 11:00 AM Appt. Only, Thu 5:30 PM - 7:30 PM Appt. Only, Fri 11:30 AM - 12:30 PM  Fees: Free   Phone: (489) 725-1209 Email:  director@[facility].org Website: https://[facility].org/     SNAP application assistance  5  [facility] - Financial Services Distance: 13.54 miles      Phone/Virtual   [address] [place] [address]  Language: English, Barbadian, Zimbabwean, Turkmen  Hours: Mon - Fri 8:00 AM - 4:30 PM  Fees: Free   Phone: (777) 562-8678 Email: hsfsprogtonys@[facility]. Website: http://www.[facility]/219/Financial-Services-Child-Support     6  [facility] Distance: 16.87 miles      Phone/Virtual   [address] [place] [address]  Language: English, Turkmen  Hours: Mon 9:00 AM - 1:00 PM , Tue - Thu 9:00 AM - 4:00 PM , Fri 9:00 AM - 1:00 PM  Fees: Free   Phone: (814) 812-7216 Email: info@[facility] Center[facility]: [place].org Website: http://www.[employer]Float: [place].org/     Soup kitchen or free meals  7  [facility] Meals Distance: 10.91 miles      In-Person   [address] [place] [address]  Language: English  Hours: Wed 5:30 PM - 6:15 PM  Fees: Free   Phone: (869) 989-7726 Email: keith@[facility] Website: http://www.[facility].org/     8  [facility] - Family Table Meals - Family Table Meal Distance: 15.47 miles      [facility]   [address] [place] [address]  Language: English  Hours: Thu 5:00 PM - 6:30 PM  Fees: Free   Phone: (762) 750-8982 Email: ExaqtWorld@[employer] [facility].[employer] Website: http://www.[employer] [facility].org          Transportation       Free or low-cost transportation  9  [employer] Distance: 3.3 miles      In-Person   [address] [place] [address]  Language: English  Hours: Mon - Fri 6:00 AM - 6:00 PM  Fees: Insurance, Self Pay   Phone: (256) 603-6794 Email: info@[employer].[facility] Website: http://www.[employer]ji.[facility]     10  [employer] [place] - Work-related transportation Distance: 14.55 miles      In-Person   [address] [place] [address]  Language: English  Hours:  Wed 10:00 AM - 5:00 PM , Thu 10:00 AM - 6:00 PM , Fri 10:00 AM - 5:00 PM , Sat 10:00 AM - 4:00 PM  Fees: Free   Phone: (815) 448-7093 Email: cherise@[employer] Website: http://www.[employer]     Transportation to medical appointments  11  [employer] Distance: 3.3 miles      In-Person   [address] [place] [address]  Language: English  Hours: Mon - Fri 6:00 AM - 6:00 PM  Fees: Insurance, Self Pay   Phone: (756) 742-4323 Email: info@[employer] Website: http://www.[facility]     12  [employer] [facility] Distance: 14.08 miles      In-Person   [address] [place] [address]  Language: English  Hours: Mon - Fri 6:30 AM - 5:30 PM  Fees: Self Pay   Phone: (615) 623-7018 Website: http://www.[employer]/          Important Numbers & Websites       Emergency Services   911  [facility] Services   311  Poison Control   (101) 755-9393  Suicide Prevention Lifeline   (872) 299-1521 (TALK)  Child Abuse Hotline   (431) 962-8248 (4-A-Child)  Sexual Assault Hotline   (548) 862-6392 (HOPE)  National Runaway Safeline   (789) 772-1236 (RUNAWAY)  All-Options Talkline   (142) 224-3676  Substance Abuse Referral   (224) 337-8831 (HELP)

## 2023-10-09 ENCOUNTER — TELEPHONE (OUTPATIENT)
Dept: GASTROENTEROLOGY | Facility: CLINIC | Age: 33
End: 2023-10-09
Payer: COMMERCIAL

## 2023-10-09 NOTE — TELEPHONE ENCOUNTER
"Endoscopy Scheduling Screen    Have you had a positive Covid test in the last 14 days?  No    Are you active on MyChart?   Yes    What insurance is in the chart?  Other:  BLUE PLUS    Ordering/Referring Provider:     ALLISON CLARK      (If ordering provider performs procedure, schedule with ordering provider unless otherwise instructed. )    BMI: Estimated body mass index is 26.79 kg/m  as calculated from the following:    Height as of 7/26/23: 1.651 m (5' 5\").    Weight as of 7/26/23: 73 kg (161 lb).     Sedation Ordered  moderate sedation.   If patient BMI > 50 do not schedule in [facility].    If patient BMI > 45 do not schedule at [facility].    Are you taking methadone or Suboxone?  No    Are you taking any prescription medications for pain 3 or more times per week?   No    Do you have a history of malignant hyperthermia or adverse reaction to anesthesia?  No    (Females) Are you currently pregnant?   No     Have you been diagnosed or told you have pulmonary hypertension?   No    Do you have an LVAD?  No    Have you been told you have moderate to severe sleep apnea?  No    Have you been told you have COPD, asthma, or any other lung disease?  No    Do you have any heart conditions?  No     Have you ever had an organ transplant?   No    Have you ever had or are you awaiting a heart or lung transplant?   No    Have you had a stroke or transient ischemic attack (TIA aka \"mini stroke\" in the last 6 months?   No    Have you been diagnosed with or been told you have cirrhosis of the liver?   No    Are you currently on dialysis?   No    Do you need assistance transferring?   No    BMI: Estimated body mass index is 26.79 kg/m  as calculated from the following:    Height as of 7/26/23: 1.651 m (5' 5\").    Weight as of 7/26/23: 73 kg (161 lb).     Is patients BMI > 40 and scheduling location [facility]?  No    Do you take an injectable medication for weight loss or diabetes (excluding insulin)?  No    Do you take the medication " Naltrexone?  No    Do you take blood thinners?  No       Prep   Are you currently on dialysis or do you have chronic kidney disease?  No    Do you have a diagnosis of diabetes?  No    Do you have a diagnosis of cystic fibrosis (CF)?  No    On a regular basis do you go 3 -5 days between bowel movements?  Yes (Extended Prep)    BMI > 40?  No    Preferred Pharmacy:    [facility] [place], [place] - [address]  [address] [facility] [address]  Phone: [phone] Fax: [phone]    [facility] - [place], [place] - [address]  [address]  [facility] [address]  Phone: [phone] Fax: [phone]    [facility] [place], [place] - [address]  [address] [facility] [address]  Phone: [phone] Fax: [phone]    [facility] [place], [place] - [address]""    [address] [address]  [facility] [address]  Phone: [phone] Fax: [phone]      Final Scheduling Details   Colonoscopy prep sent?  N/A    Procedure scheduled  Upper endoscopy (EGD)    Surgeon:  BRENTON martinez     Date of procedure:  10/24/2023     Pre-OP / PAC:   No - Not required for this site.    Location  [facility] - ASC  NEXT AVAILABLE    Sedation   MAC/Deep Sedation  NEXT AVAILABLE      Patient Reminders:   You will receive a call from a Nurse to review instructions and health history.  This assessment must be completed prior to your procedure.  Failure to complete the Nurse assessment may result in the procedure being cancelled.      On the day of your procedure, please designate an adult(s) who can drive you home stay with you for the next 24 hours. The medicines used in the exam will make you sleepy. You will not be able to drive.      You cannot take public transportation, ride share services, or non-medical taxi service without a responsible caregiver.  Medical transport services are allowed with the requirement that a responsible caregiver will receive you at your destination.  We require  that drivers and caregivers are confirmed prior to your procedure.

## 2023-10-09 NOTE — TELEPHONE ENCOUNTER
[facility] Call Center    Phone Message    May a detailed message be left on voicemail: Yes    Reason for Call: Other: Patient is currently scheduled on 11/17/23, as a patient New Esophageal Urgent. This is outside the expected timeline for this schedule. Paitent has been added to the waitlist.      Action Taken: Message routed to:  Other: GI REFERRAL TRIAGE POOL     Travel Screening: Not Applicable

## 2023-10-09 NOTE — TELEPHONE ENCOUNTER
Pre assessment completed for upcoming procedure.      Procedure details:    Patient scheduled for Upper endoscopy (EGD) on 10/24/23.     Arrival time: 1430. Procedure time 1530    Pre op exam needed? N/A    Facility location: [facility]; [address] [place] [address], [address] Floor, [place] [address]    Sedation type: MAC    Indication for procedure:   Gastroesophageal reflux disease without esophagitis [K21.9]  - Primary      Owen's esophagus without dysplasia [K22.70]      Hiatal hernia [K44.9]      Esophageal dysphagia          COVID policy reviewed.    Designated  policy reviewed. Instructed to have someone stay 24 hours post procedure.       Chart review:     Electronic implanted devices? No    Diabetic? No    Diabetic medication HOLDING recommendations: (if applicable)  Oral diabetic medications: No  Diabetic injectables: No  Insulin: No    Medication review:    Anticoagulants? No    NSAIDS? Yes.  Ibuprofen (Advil, Motrin).  Holding interval of 1 day before procedure. and Naproxen (Naprosyn, Aleve).  Holding interval of 4 days.    Other medication HOLDING recommendations:  Sucralfate (Carafate): HOLD 1 day before procedure.      Prep for procedure:     Prep instructions sent via [employer]     Reviewed procedure prep instructions.     Patient verbalized understanding and had no questions or concerns at this time.        Kanika Vasquez RN  Endoscopy Procedure Pre Assessment RN  [phone] option 4

## 2023-10-11 NOTE — TELEPHONE ENCOUNTER
Patient is currently scheduled appropriately based on current openings with esophageal providers. Closing encounter

## 2023-10-24 ENCOUNTER — ANESTHESIA (OUTPATIENT)
Dept: SURGERY | Facility: AMBULATORY SURGERY CENTER | Age: 33
End: 2023-10-24
Payer: COMMERCIAL

## 2023-10-24 ENCOUNTER — ANESTHESIA EVENT (OUTPATIENT)
Dept: SURGERY | Facility: AMBULATORY SURGERY CENTER | Age: 33
End: 2023-10-24
Payer: COMMERCIAL

## 2023-10-24 ENCOUNTER — HOSPITAL ENCOUNTER (OUTPATIENT)
Facility: AMBULATORY SURGERY CENTER | Age: 33
Discharge: HOME | End: 2023-10-24
Attending: INTERNAL MEDICINE
Payer: COMMERCIAL

## 2023-10-24 VITALS
RESPIRATION RATE: 16 BRPM | WEIGHT: 165 LBS | BODY MASS INDEX: 27.49 KG/M2 | TEMPERATURE: 97.9 F | DIASTOLIC BLOOD PRESSURE: 64 MMHG | HEIGHT: 65 IN | HEART RATE: 82 BPM | OXYGEN SATURATION: 100 % | SYSTOLIC BLOOD PRESSURE: 108 MMHG

## 2023-10-24 VITALS — HEART RATE: 80 BPM

## 2023-10-24 LAB
HCG UR QL: NEGATIVE
INTERNAL QC OK POCT: NORMAL
POCT KIT EXPIRATION DATE: NORMAL
POCT KIT LOT NUMBER: NORMAL
UPPER GI ENDOSCOPY: NORMAL

## 2023-10-24 PROCEDURE — 81025 URINE PREGNANCY TEST: CPT | Performed by: PATHOLOGY

## 2023-10-24 PROCEDURE — 88305 TISSUE EXAM BY PATHOLOGIST: CPT | Mod: 26 | Performed by: STUDENT IN AN ORGANIZED HEALTH CARE EDUCATION/TRAINING PROGRAM

## 2023-10-24 PROCEDURE — 88305 TISSUE EXAM BY PATHOLOGIST: CPT | Mod: TC | Performed by: INTERNAL MEDICINE

## 2023-10-24 PROCEDURE — 43239 EGD BIOPSY SINGLE/MULTIPLE: CPT | Performed by: INTERNAL MEDICINE

## 2023-10-24 RX ORDER — NALOXONE HYDROCHLORIDE 0.4 MG/ML
0.4 INJECTION, SOLUTION INTRAMUSCULAR; INTRAVENOUS; SUBCUTANEOUS
Status: CANCELLED | OUTPATIENT
Start: 2023-10-24

## 2023-10-24 RX ORDER — NALOXONE HYDROCHLORIDE 0.4 MG/ML
0.2 INJECTION, SOLUTION INTRAMUSCULAR; INTRAVENOUS; SUBCUTANEOUS
Status: CANCELLED | OUTPATIENT
Start: 2023-10-24

## 2023-10-24 RX ORDER — FLUMAZENIL 0.1 MG/ML
0.2 INJECTION, SOLUTION INTRAVENOUS
Status: CANCELLED | OUTPATIENT
Start: 2023-10-24 | End: 2023-10-25

## 2023-10-24 RX ORDER — SODIUM CHLORIDE, SODIUM LACTATE, POTASSIUM CHLORIDE, CALCIUM CHLORIDE 600; 310; 30; 20 MG/100ML; MG/100ML; MG/100ML; MG/100ML
INJECTION, SOLUTION INTRAVENOUS CONTINUOUS PRN
Status: DISCONTINUED | OUTPATIENT
Start: 2023-10-24 | End: 2023-10-24

## 2023-10-24 RX ORDER — ONDANSETRON 4 MG/1
4 TABLET, ORALLY DISINTEGRATING ORAL EVERY 6 HOURS PRN
Status: CANCELLED | OUTPATIENT
Start: 2023-10-24

## 2023-10-24 RX ORDER — PROCHLORPERAZINE MALEATE 10 MG
10 TABLET ORAL EVERY 6 HOURS PRN
Status: CANCELLED | OUTPATIENT
Start: 2023-10-24

## 2023-10-24 RX ORDER — PROPOFOL 10 MG/ML
INJECTION, EMULSION INTRAVENOUS PRN
Status: DISCONTINUED | OUTPATIENT
Start: 2023-10-24 | End: 2023-10-24

## 2023-10-24 RX ORDER — ONDANSETRON 2 MG/ML
4 INJECTION INTRAMUSCULAR; INTRAVENOUS
Status: DISCONTINUED | OUTPATIENT
Start: 2023-10-24 | End: 2023-10-25 | Stop reason: HOSPADM

## 2023-10-24 RX ORDER — LIDOCAINE 40 MG/G
CREAM TOPICAL
Status: DISCONTINUED | OUTPATIENT
Start: 2023-10-24 | End: 2023-10-25 | Stop reason: HOSPADM

## 2023-10-24 RX ORDER — PROPOFOL 10 MG/ML
INJECTION, EMULSION INTRAVENOUS CONTINUOUS PRN
Status: DISCONTINUED | OUTPATIENT
Start: 2023-10-24 | End: 2023-10-24

## 2023-10-24 RX ORDER — ONDANSETRON 2 MG/ML
4 INJECTION INTRAMUSCULAR; INTRAVENOUS EVERY 6 HOURS PRN
Status: CANCELLED | OUTPATIENT
Start: 2023-10-24

## 2023-10-24 RX ADMIN — PROPOFOL 90 MG: 10 INJECTION, EMULSION INTRAVENOUS at 13:47

## 2023-10-24 RX ADMIN — PROPOFOL 250 MCG/KG/MIN: 10 INJECTION, EMULSION INTRAVENOUS at 13:47

## 2023-10-24 RX ADMIN — SODIUM CHLORIDE, SODIUM LACTATE, POTASSIUM CHLORIDE, CALCIUM CHLORIDE: 600; 310; 30; 20 INJECTION, SOLUTION INTRAVENOUS at 13:13

## 2023-10-24 NOTE — ANESTHESIA POSTPROCEDURE EVALUATION
Patient: Xiomara Doherty    Procedure: Procedure(s):  ESOPHAGOGASTRODUODENOSCOPY WITH BIOPSY       Anesthesia Type:  No value filed.    Note:  Disposition: Outpatient   Postop Pain Control: Uneventful            Sign Out: Well controlled pain   PONV: No   Neuro/Psych: Uneventful            Sign Out: Acceptable/Baseline neuro status   Airway/Respiratory: Uneventful            Sign Out: Acceptable/Baseline resp. status   CV/Hemodynamics: Uneventful            Sign Out: Acceptable CV status; No obvious hypovolemia; No obvious fluid overload   Other NRE: NONE   DID A NON-ROUTINE EVENT OCCUR? No           Last vitals:  Vitals Value Taken Time   /64 10/24/23 1404   Temp 36.6  C (97.9  F) 10/24/23 1404   Pulse 82 10/24/23 1404   Resp 16 10/24/23 1404   SpO2 100 % 10/24/23 1404       Electronically Signed By: Kev Morales MD  October 24, 2023  3:33 PM
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
## 2023-10-24 NOTE — ANESTHESIA CARE TRANSFER NOTE
Patient: Xiomara Doherty    Procedure: Procedure(s):  ESOPHAGOGASTRODUODENOSCOPY WITH BIOPSY       Diagnosis: Gastroesophageal reflux disease without esophagitis [K21.9]  Owen's esophagus without dysplasia [K22.70]  Hiatal hernia [K44.9]  Esophageal dysphagia [R13.19]  Diagnosis Additional Information: No value filed.    Anesthesia Type:   No value filed.     Note:    Oropharynx: spontaneously breathing  Level of Consciousness: awake  Oxygen Supplementation: room air    Independent Airway: airway patency satisfactory and stable  Dentition: dentition unchanged  Vital Signs Stable: post-procedure vital signs reviewed and stable  Report to RN Given: handoff report given  Patient transferred to: Phase II    Handoff Report: Identifed the Patient, Identified the Reponsible Provider, Reviewed the pertinent medical history, Discussed the surgical course, Reviewed Intra-OP anesthesia mangement and issues during anesthesia, Set expectations for post-procedure period and Allowed opportunity for questions and acknowledgement of understanding  Vitals:  Vitals Value Taken Time   BP     Temp     Pulse 80 10/24/23 1355   Resp     SpO2         Electronically Signed By: KEL Ray CRNA  October 24, 2023  2:01 PM

## 2023-10-26 LAB
PATH REPORT.COMMENTS IMP SPEC: NORMAL
PATH REPORT.COMMENTS IMP SPEC: NORMAL
PATH REPORT.FINAL DX SPEC: NORMAL
PATH REPORT.GROSS SPEC: NORMAL
PATH REPORT.MICROSCOPIC SPEC OTHER STN: NORMAL
PATH REPORT.RELEVANT HX SPEC: NORMAL
PHOTO IMAGE: NORMAL

## 2023-10-27 ENCOUNTER — MYC REFILL (OUTPATIENT)
Dept: FAMILY MEDICINE | Facility: CLINIC | Age: 33
End: 2023-10-27
Payer: COMMERCIAL

## 2023-10-27 DIAGNOSIS — M79.605 LOW BACK PAIN RADIATING TO BOTH LEGS: ICD-10-CM

## 2023-10-27 DIAGNOSIS — M48.061 NEURAL FORAMINAL STENOSIS OF LUMBAR SPINE: ICD-10-CM

## 2023-10-27 DIAGNOSIS — M79.604 LOW BACK PAIN RADIATING TO BOTH LEGS: ICD-10-CM

## 2023-10-27 DIAGNOSIS — M54.50 LOW BACK PAIN RADIATING TO BOTH LEGS: ICD-10-CM

## 2023-10-27 RX ORDER — NAPROXEN 500 MG/1
500 TABLET ORAL 2 TIMES DAILY PRN
Qty: 30 TABLET | Refills: 1 | Status: SHIPPED | OUTPATIENT
Start: 2023-10-27

## 2023-11-15 NOTE — TELEPHONE ENCOUNTER
REFERRAL INFORMATION:  Referring Provider:  Eneida Aviles APRN CNP  Referring Clinic:  [facility]   Reason for Visit/Diagnosis: Gastroesophageal reflux disease without esophagitis [K21.9]; Owen's esophagus without dysplasia [K22.70]; Hiatal hernia [K44.9]; Esophageal dysphagia [R13.19]     FUTURE VISIT INFORMATION:  Appointment Date: 11/17/23  Appointment Time: 8:30 AM      NOTES STATUS DETAILS   OFFICE NOTE from Referring Provider Internal 10/6/23, 4/23/19, 4/9/19, 1/4/19, 5/6/16, 10/2/17 - Eneida Aviles APRN CNP - Internal Medicine - GERD; Owen's Esophagus    OFFICE NOTE from Other Specialist Internal 10/10/14 - Shanita Jones NP - [facility] - Esophageal reflux; Owen's Esophagus    MEDICATION LIST Internal         ENDOSCOPY  Internal 10/24/23, 4/24/19, 10/29/14, 2/3/11 - EGD     [facility]:   6/21/12, 2/3/11 - EGD    COLONOSCOPY Internal [facility]:   6/21/12   PERTINENT LABS Internal 10/6/23 - CMP; CBC w/ Diff; Lipid; TSH   PATHOLOGY REPORTS (RELATED) Internal 10/24/23, 4/24/19, 10/29/14, 2/3/11 - EGD bx

## 2023-11-17 ENCOUNTER — VIRTUAL VISIT (OUTPATIENT)
Dept: GASTROENTEROLOGY | Facility: CLINIC | Age: 33
End: 2023-11-17
Attending: NURSE PRACTITIONER
Payer: COMMERCIAL

## 2023-11-17 ENCOUNTER — TELEPHONE (OUTPATIENT)
Dept: GASTROENTEROLOGY | Facility: CLINIC | Age: 33
End: 2023-11-17

## 2023-11-17 ENCOUNTER — PRE VISIT (OUTPATIENT)
Dept: GASTROENTEROLOGY | Facility: CLINIC | Age: 33
End: 2023-11-17

## 2023-11-17 VITALS — BODY MASS INDEX: 26.66 KG/M2 | HEIGHT: 65 IN | WEIGHT: 160 LBS

## 2023-11-17 DIAGNOSIS — K22.70 BARRETT'S ESOPHAGUS WITHOUT DYSPLASIA: ICD-10-CM

## 2023-11-17 DIAGNOSIS — K44.9 HIATAL HERNIA: ICD-10-CM

## 2023-11-17 DIAGNOSIS — K21.9 GASTROESOPHAGEAL REFLUX DISEASE WITHOUT ESOPHAGITIS: Chronic | ICD-10-CM

## 2023-11-17 PROCEDURE — 99204 OFFICE O/P NEW MOD 45 MIN: CPT | Mod: 95 | Performed by: PHYSICIAN ASSISTANT
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
## 2023-11-17 NOTE — TELEPHONE ENCOUNTER
[facility] Call Center    Phone Message    May a detailed message be left on voicemail: yes     Reason for Call: Medication Question or concern regarding medication   Prescription Clarification  Name of Medication: esomeprazole (NEXIUM) 20 MG DR capsule   Prescribing Provider: DR Olivares   Pharmacy: [facility] [address] [place], [place] - [address] [address]    What on the order needs clarification? Migue from pharmacystates that current dosing of 1 capsul twice a day exceeds max daily dosing and is not covered by insurance. Please call pharmacy to advise.       Action Taken: Message routed to:  [facility] ([facility] Gastro    Travel Screening: Not Applicable

## 2023-11-17 NOTE — TELEPHONE ENCOUNTER
Prior Authorization Retail Medication Request    Medication/Dose: esomeprazole (NEXIUM) 20 MG DR capsule  Take 1 capsule (20 mg) by mouth 2 times daily for 180 days Take 30-60 minutes before eating. - Oral  Diagnosis and ICD code (if different than what is on RX):    New/renewal/insurance change PA/secondary ins. PA:  Previously Tried and Failed:    Rationale:   - With prior history of Owen's esophagus patient should remain on PPI indefinitely. Please discontinue Protonix/Carafate and start Nexium (Esomeprazole) 20 mg twice daily, this is best taken on an empty stomach 30 - 60 minutes prior to meals     Insurance   Primary: BLUE [employer]US [employer]VANTAGE  Insurance ID: [id number]    Secondary (if applicable):  Insurance ID:      Pharmacy Information (if different than what is on RX)  Name:    Phone:   Fax:

## 2023-11-17 NOTE — PATIENT INSTRUCTIONS
It was a pleasure taking care of you today.  I've included a brief summary of our discussion and care plan from today's visit below.  Please review this information with your primary care provider.  _______________________________________________________________________    My recommendations are summarized as follows:    - Please discontinue Protonix and Carafate  - Start Nexium (Esomeprazole) 20 mg twice daily, this is best taken on an empty stomach 30 - 60 minutes prior to meals   - Reflux lifestyle modifications as directed within the AVS   - Obtain a wedge pillow or sleep with the head end of the bed elevated to at least 30 degrees   - Attempt not to eat or drink 3 - 4 hours prior to laying down flat     Gastroesophageal Reflux Disease (GERD) Lifestyle Modifications:   If taking acid suppression therapy (PPI ie Pantoprazole, Lansoprazole, Omeprazole, Esomeprazole, Rabeprazole, Dexlansoprazole) it should be taken 30 - 60 minutes prior to meals on an empty stomach to have maximum effect  Avoid triggers for reflux such as coffee, chocolate, carbonated beverages, spicy foods, acidic foods (tomato based/citrus and foods with high fat content   Abstinence from alcohol and cessation of all tobacco products is recommended   Studies have shown that weight loss, exercise and maintaining a healthy BMI significantly reduce GERD symptoms   Remain upright while eating and immediately after meals  Do not eat or drink at least 3 hours prior to laying down supine/laying down for bed   Avoid late night/middle of the night snacking    Consider obtaining a wedge pillow or elevating the head end of the bed while sleeping   Avoid sleeping right side down as this can place the lower part of the esophagus/lower esophageal sphincter in a dependent position that favors reflux   Attempting to eat smaller more frequent meals may improve symptoms       To schedule endoscopic procedures you may call: [phone]  To schedule radiology  (imaging) tests you may call: [phone]  To schedule an ENT appointment you may call: [phone]    Please call my nurse Christelle ([phone]), Isabel ([phone]) with any questions or concerns.      Return to GI Clinic in 4 months to review your progress.    _______________________________________________________________________    Who do I call with any questions after my visit?  Please be in touch if there are any further questions that arise following today's visit.  There are multiple ways to contact your gastroenterology care team.      During business hours, you may reach a Gastroenterology nurse at [phone] and choose option 3.       To schedule or reschedule an appointment, please call [phone].     You can always send a secure message through 1stdibs.  1stdibs messages are answered by your nurse or doctor typically within 24 hours.  Please allow extra time on weekends and holidays.      For urgent/emergent questions after business hours, you may reach the on-call GI Fellow by contacting the [facility]  at [phone].     How will I get the results of any tests ordered?    You will receive all of your results.  If you have signed up for Plynkedt, any tests ordered at your visit will be available to you after your physician reviews them.  Typically this takes 1-2 weeks.  If there are urgent results that require a change in your care plan, your physician or nurse will call you to discuss the next steps.      What is 1stdibs?  1stdibs is a secure way for you to access all of your healthcare records from the [facility].  It is a web based computer program, so you can sign on to it from any location.  It also allows you to send secure messages to your care team.  I recommend signing up for 1stdibs access if you have not already done so and are comfortable with using a computer.      How to I schedule a follow-up visit?  If you did not schedule a follow-up visit  today, please call [phone] to schedule a follow-up office visit.      If you feel you received exceptional care and are interested in supporting the clinical and research goals of Kaila Brooks PA-C or the Division of Gastroenterology, Hepatology, and Nutrition please contact [email or web address] from the [facility] to discuss opportunities to donate.    Sincerely,    Kaila Brooks PA-C  Division of Gastroenterology, Hepatology, and Nutrition  [facility]

## 2023-11-17 NOTE — NURSING NOTE
Is the patient currently in the state of [place]? YES    Visit mode:VIDEO    If the visit is dropped, the patient can be reconnected by: VIDEO VISIT: Text to cell phone:   Telephone Information:   Mobile [phone]       Will anyone else be joining the visit? NO  (If patient encounters technical issues they should call [phone]    How would you like to obtain your AVS? MyChart    Are changes needed to the allergy or medication list? No    Reason for visit: Consult    Henny HAYNES

## 2023-11-22 NOTE — TELEPHONE ENCOUNTER
Central Prior Authorization Team   Phone: [phone]    PA Initiation    Medication: ESOMEPRAZOLE MAGNESIUM 20 MG PO PACK  Insurance Company: Blue Plus PMA - Phone [phone] Fax [phone]  Pharm[facility] the Rx[facility] [address] [facility] [place] [facility] [address] [address] [place] [address]  Filling Pharmacy Phone: [phone]  Filling Pharmacy Fax:    Start Date: 11/22/2023

## 2023-11-22 NOTE — TELEPHONE ENCOUNTER
Prior Authorization Approval    Medication: ESOMEPRAZOLE MAGNESIUM 20 MG PO PACK  Authorization Effective Date: 8/24/2023  Authorization Expiration Date: 11/22/2024  Approved Dose/Quantity:   Reference #:     Insurance Company: Blue Plus PMAP - Phone [phone] Fax [phone]  Expected CoPay: $    CoPay Card Available:      Financial Assistance Needed:   Which Pharmacy is filling the prescription: [id number] - [place], [place] - [address] [address] [place] [address]  Pharmacy Notified: Yes  Patient Notified:

## 2023-11-28 ENCOUNTER — TELEPHONE (OUTPATIENT)
Dept: GASTROENTEROLOGY | Facility: CLINIC | Age: 33
End: 2023-11-28
Payer: COMMERCIAL

## 2024-01-06 ENCOUNTER — MYC REFILL (OUTPATIENT)
Dept: FAMILY MEDICINE | Facility: CLINIC | Age: 34
End: 2024-01-06
Payer: COMMERCIAL

## 2024-01-06 DIAGNOSIS — R13.19 ESOPHAGEAL DYSPHAGIA: ICD-10-CM

## 2024-01-06 DIAGNOSIS — K21.9 GASTROESOPHAGEAL REFLUX DISEASE WITHOUT ESOPHAGITIS: Chronic | ICD-10-CM

## 2024-01-06 DIAGNOSIS — K44.9 HIATAL HERNIA: ICD-10-CM

## 2024-01-06 DIAGNOSIS — K22.70 BARRETT'S ESOPHAGUS WITHOUT DYSPLASIA: ICD-10-CM

## 2024-01-08 RX ORDER — PANTOPRAZOLE SODIUM 40 MG/1
40 TABLET, DELAYED RELEASE ORAL DAILY
Qty: 90 TABLET | Refills: 2 | Status: SHIPPED | OUTPATIENT
Start: 2024-01-08

## 2024-04-30 ENCOUNTER — E-VISIT (OUTPATIENT)
Dept: FAMILY MEDICINE | Facility: CLINIC | Age: 34
End: 2024-04-30
Payer: COMMERCIAL

## 2024-04-30 DIAGNOSIS — L65.9 HAIR LOSS: Primary | ICD-10-CM

## 2024-04-30 PROCEDURE — 99207 PR NON-BILLABLE SERV PER CHARTING: CPT | Performed by: NURSE PRACTITIONER

## 2024-05-01 NOTE — PATIENT INSTRUCTIONS
Thank you for choosing us for your care. I think a video  visit would be best next steps based on your symptoms. Please schedule a clinic appointment; you won t be charged for this eVisit.      You can schedule an appointment right here in [facility], or call [phone]

## 2024-05-06 ENCOUNTER — VIRTUAL VISIT (OUTPATIENT)
Dept: FAMILY MEDICINE | Facility: CLINIC | Age: 34
End: 2024-05-06
Payer: COMMERCIAL

## 2024-05-06 DIAGNOSIS — R68.89 FLUCTUATION OF WEIGHT: ICD-10-CM

## 2024-05-06 DIAGNOSIS — Z13.0 SCREENING FOR DISORDER OF BLOOD AND BLOOD-FORMING ORGANS: ICD-10-CM

## 2024-05-06 DIAGNOSIS — L65.9 HAIR LOSS: ICD-10-CM

## 2024-05-06 DIAGNOSIS — Z13.1 SCREENING FOR DIABETES MELLITUS (DM): ICD-10-CM

## 2024-05-06 DIAGNOSIS — Z11.59 NEED FOR HEPATITIS C SCREENING TEST: ICD-10-CM

## 2024-05-06 DIAGNOSIS — R53.83 OTHER FATIGUE: ICD-10-CM

## 2024-05-06 DIAGNOSIS — Z13.29 SCREENING FOR THYROID DISORDER: ICD-10-CM

## 2024-05-06 DIAGNOSIS — Z13.6 CARDIOVASCULAR SCREENING; LDL GOAL LESS THAN 160: Primary | ICD-10-CM

## 2024-05-06 PROCEDURE — 99213 OFFICE O/P EST LOW 20 MIN: CPT | Mod: 95 | Performed by: NURSE PRACTITIONER

## 2024-05-06 ASSESSMENT — ENCOUNTER SYMPTOMS: FATIGUE: 1

## 2024-05-06 NOTE — PROGRESS NOTES
"    Instructions Relayed to Patient by Virtual Roomer:     Patient is active on TwentyFeett:   Relayed following to patient: \"It looks like you are active on TwentyFeett, are you able to join the visit this way? If not, do you need us to send you a link now or would you like your provider to send a link via text or email when they are ready to initiate the visit?\"      Patient Confirmed they will join visit via: Text Link to Cell Phone  Reminded patient to ensure they were logged on to virtual visit by arrival time listed.   Asked if patient has flexibility to initiate visit sooner than arrival time: patient is unable to initiate visit earlier than arrival time     If pediatric virtual visit, ensured pediatric patient along with parent/guardian will be present for video visit.     Patient offered the website [email or web address] and/or phone number to [facility] Help line: [phone]    Aleksandr is a 33 year old who is being evaluated via a billable video visit.    How would you like to obtain your AVS? PollenizerharTaxify  If the video visit is dropped, the invitation should be resent by: Text to cell phone: [phone]  Will anyone else be joining your video visit? No          Subjective   Aleksandr is a 33 year old, presenting for the following health issues:  Hair/Scalp Problem (Falling out)        5/6/2024     2:37 PM   Additional Questions   Roomed by Glenna GREEN   Accompanied by Self       Video Start Time: 4:43 PM    Hair/Scalp Problem  Associated symptoms include fatigue.   Fatigue  Associated symptoms include fatigue.   History of Present Illness       Reason for visit:  Hair loss, fatigued, weight fluxuation    She eats 2-3 servings of fruits and vegetables daily.She consumes 0 sweetened beverage(s) daily.She exercises with enough effort to increase her heart rate 30 to 60 minutes per day.  She exercises with enough effort to increase her heart rate 4 days per week.   She is taking medications regularly.     For the past " 2+ months, my hair is falling out by the handfuls all day long. My weight is all over the place. My energy level is not existent. Trouble sleeping. Trouble habing regular bowel movements.     Fatigue  Onset: 2 months ago   Description: How severe is the fatigue? moderate   Life is crazy busy. Is trying vitamins    Does the fatigue interfere with your life:Yes Details: especially with young kids not energy for them      How much sleep are you getting? 5 Hours     How much sleep do you normally need to feel well? 4 Hours  States has been this her whole life   Exercise: no regular exercise program  Are there episodes of normal energy levels: Yes usually when doing something   Accompanying Signs & Symptoms:  Falling asleep during the day: YES- sometimes   Snoring: no  Do you stop breathing while sleeping: no                 Night sweats: YES- lots new and will wake up drenched   Has a cooling pillow and mattress       Fevers:no  Chest Pain: no  Pain other places in the body? no  Change in appetite: no                 Weight gain/loss: YES- large fluctuations in weight. Went 178 to 139 and now on 147   Dark or bloody stools: no  Heavy periods (women) has IUD   Substance use:             Caffeine use:YES- one coffee a day               Alcohol use: Less than 1 beverage / month               Illicit drug use:None    Mood  Depressed mood: no  Any new anxiety/stressors:no  Any new deaths or losses? no      10/3/2016     3:16 PM 3/21/2018    11:17 AM 11/30/2020     4:04 PM   PHQ-9 SCORE   PHQ-9 Total Score 4 2 8       Therapies tried:  skin and digestion vitamin  and hair and energy vitamins        Depression and Anxiety   How are you doing with your depression since your last visit? No change  How are you doing with your anxiety since your last visit?  No change  Are you having other symptoms that might be associated with depression or anxiety? No  Have you had a significant life event? No   Do you have any concerns with  your use of alcohol or other drugs? No    Social History     Tobacco Use    Smoking status: Never     Passive exposure: Never    Smokeless tobacco: Never   Substance Use Topics    Alcohol use: Yes     Comment: rare    Drug use: No         10/3/2016     3:16 PM 3/21/2018    11:17 AM 11/30/2020     4:04 PM   PHQ   PHQ-9 Total Score 4 2 8   Q9: Thoughts of better off dead/self-harm past 2 weeks Not at all Not at all Not at all         5/6/2016     5:17 PM 10/3/2016     3:16 PM 11/30/2020     4:04 PM   HOLLIS-7 SCORE   Total Score 14 7 7         11/30/2020     4:04 PM   Last PHQ-9   1.  Little interest or pleasure in doing things 2   2.  Feeling down, depressed, or hopeless 1   3.  Trouble falling or staying asleep, or sleeping too much 2   4.  Feeling tired or having little energy 1   5.  Poor appetite or overeating 0   6.  Feeling bad about yourself 1   7.  Trouble concentrating 1   8.  Moving slowly or restless 0   Q9: Thoughts of better off dead/self-harm past 2 weeks 0   PHQ-9 Total Score 8         11/30/2020     4:04 PM   HOLLIS-7    1. Feeling nervous, anxious, or on edge 2   2. Not being able to stop or control worrying 1   3. Worrying too much about different things 1   4. Trouble relaxing 1   5. Being so restless that it is hard to sit still 0   6. Becoming easily annoyed or irritable 2   7. Feeling afraid, as if something awful might happen 0   HOLLIS-7 Total Score 7       Suicide Assessment Five-step Evaluation and Treatment (SAFE-T)      Lab work is in process  Labs reviewed in EPIC  BP Readings from Last 3 Encounters:   10/24/23 108/64   07/26/23 123/89   06/09/22 122/87    Wt Readings from Last 3 Encounters:   11/17/23 72.6 kg (160 lb)   10/24/23 74.8 kg (165 lb)   07/26/23 73 kg (161 lb)                  Patient Active Problem List   Diagnosis    Menorrhagia    Back pain    Urinary frequency    Adjustment disorder with anxiety    Generalized anxiety disorder    Psoriasis    Esophageal reflux    New daily  persistent headache    Post-concussion headache    Supervision of other normal pregnancy, antepartum    Need for rhogam due to Rh negative mother    Owen esophagus    Numbness and tingling    Ovarian cyst, left    Monoallelic mutation of GJB2 gene     Past Surgical History:   Procedure Laterality Date    ARTHROSCOPY KNEE  7/26/2011    Procedure:ARTHROSCOPY KNEE; diagnostic; Surgeon:VIOLETTA JIM; Location:[facility] OR    ARTHROSCOPY KNEE RT/LT  10/20/09    right knee    ARTHROSCOPY KNEE WITH RETINACULAR RELEASE  7/26/2011    Procedure:ARTHROSCOPY KNEE WITH RETINACULAR RELEASE; lateral release; Surgeon:VIOLETTA JIM; Location:[facility] OR    BIOPSY      COLONOSCOPY  06/21/12    [facility]    COLONOSCOPY WITH CO2 INSUFFLATION N/A 10/29/2014    Procedure: COLONOSCOPY WITH CO2 INSUFFLATION;  Surgeon: Duane, William Charles, MD;  Location: [facility] OR    COMBINED ESOPHAGOSCOPY, GASTROSCOPY, DUODENOSCOPY (EGD) WITH CO2 INSUFFLATION N/A 4/24/2019    Procedure: ESOPHAGOGASTRODUODENOSCOPY, WITH CO2 INSUFFLATION;  Surgeon: Duane, William Charles, MD;  Location: [facility] OR    ESOPHAGOSCOPY, GASTROSCOPY, DUODENOSCOPY (EGD), COMBINED  2/3/2011    COMBINED ESOPHAGOSCOPY, GASTROSCOPY, DUODENOSCOPY (EGD), BIOPSY SINGLE OR MULTIPLE performed by DINAH VALDEZ [facility]    ESOPHAGOSCOPY, GASTROSCOPY, DUODENOSCOPY (EGD), COMBINED N/A 10/29/2014    Procedure: COMBINED ESOPHAGOSCOPY, GASTROSCOPY, DUODENOSCOPY (EGD), BIOPSY SINGLE OR MULTIPLE;  Surgeon: Duane, William Charles, MD;  Location: [facility] OR    ESOPHAGOSCOPY, GASTROSCOPY, DUODENOSCOPY (EGD), COMBINED N/A 10/24/2023    Procedure: ESOPHAGOGASTRODUODENOSCOPY WITH BIOPSY;  Surgeon: Nik Stephen MD;  Location: [facility]     REMOVE TONSILS/ADENOIDS,<13 Y/O  1997    IRRIGATION AND DEBRIDEMENT KNEE, COMBINED  7/26/2011    Procedure:COMBINED IRRIGATION AND DEBRIDEMENT KNEE; dedridement; Surgeon:VIOLETTA JIM; Location: OR    SOFT TISSUE SURGERY      [facility] ENDOSCOPY, SIMPLE EXAM  06/21/12     [facility]       Social History     Tobacco Use    Smoking status: Never     Passive exposure: Never    Smokeless tobacco: Never   Substance Use Topics    Alcohol use: Yes     Comment: rare     Family History   Problem Relation Age of Onset    Cancer Father         skin cancer on face    Hypertension Father     Hypertension Paternal Grandfather     Prostate Cancer Maternal Grandfather     Asthma Sister         sports         Current Outpatient Medications   Medication Sig Dispense Refill    acetaminophen (TYLENOL) 500 MG tablet Take 1,000 mg by mouth      cyclobenzaprine (FLEXERIL) 10 MG tablet Take 1 tablet (10 mg) by mouth 3 times daily as needed for muscle spasms 30 tablet 1    esomeprazole (NEXIUM) 20 MG DR capsule Take 1 capsule (20 mg) by mouth 2 times daily for 180 days Take 30-60 minutes before eating. 180 capsule 1    ibuprofen (ADVIL/MOTRIN) 200 MG tablet       levonorgestrel (MIRENA) 20 MCG/DAY IUD 1 each (20 mcg) by Intrauterine route once      multivitamin w/minerals (THERA-VIT-M) tablet Take 1 tablet by mouth daily      naproxen (NAPROSYN) 500 MG tablet Take 1 tablet (500 mg) by mouth 2 times daily as needed 30 tablet 1    oxyCODONE-acetaminophen (PERCOCET) 5-325 MG tablet Take 1 tablet by mouth every 6 hours as needed for pain 12 tablet 0    pantoprazole (PROTONIX) 40 MG EC tablet Take 1 tablet (40 mg) by mouth daily 90 tablet 2    sucralfate (CARAFATE) 1 GM tablet Take 1 tablet (1 g) by mouth 4 times daily 120 tablet 1    tiZANidine (ZANAFLEX) 4 MG tablet Take 1 tablet (4 mg) by mouth 3 times daily 30 tablet 1    gabapentin (NEURONTIN) 300 MG capsule Take 1 capsule (300 mg) by mouth daily for 3 days, THEN 1 capsule (300 mg) 2 times daily for 3 days, THEN 1 capsule (300 mg) 3 times daily for 30 days. 90 capsule 3     Allergies   Allergen Reactions    Nuts Shortness Of Breath     Other reaction(s): Swelling  Peanuts, pecans, walnuts, cashews.  Tongue swells and is painful.    Pineapple Shortness Of  Breath and Anaphylaxis     Pt. Allergic to pineapple    Fentanyl Itching    Vicodin [Hydrocodone-Acetaminophen] Hives     And vomiting       Review of Systems  Constitutional, neuro, ENT, endocrine, pulmonary, cardiac, gastrointestinal, genitourinary, musculoskeletal, integument and psychiatric systems are negative, except as otherwise noted.      Objective           Vitals:  No vitals were obtained today due to virtual visit.    Physical Exam   GENERAL: alert and no distress  EYES: Eyes grossly normal to inspection and conjunctivae and sclerae normal  HENT: normal cephalic/atraumatic and oral mucous membranes moist  RESP: No audible wheeze, cough, or visible cyanosis.    MS: No gross musculoskeletal defects noted.  Normal range of motion.  No visible edema.  SKIN: Visible skin clear. No significant rash, abnormal pigmentation or lesions.  NEURO: mentation intact  PSYCH: Appropriate affect, tone, and pace of words    Pending orders and results     Assessment & Plan     Other fatigue  - Ferritin  - Vitamin B12  - Vitamin D Deficiency  - Iron & Iron Binding Capacity    Fluctuation of weight  Will follow up and/or notify patient of  results via My Chart to determine further need for followup   - TSH with free T4 reflex    Hair loss  Will follow up and/or notify patient of  results via My Chart to determine further need for followup   - TSH with free T4 reflex    CARDIOVASCULAR SCREENING; LDL GOAL LESS THAN 1  - Lipid panel reflex to direct LDL Fasting    Screening for diabetes mellitus (DM)  - Comprehensive metabolic panel    Screening for thyroid disorder  - TSH with free T4 reflex    Screening for disorder of blood and blood-forming organs  - CBC with platelets  - Iron & Iron Binding Capacity    Need for hepatitis C screening test  - Hepatitis C Screen Reflex to HCV RNA Quant and Genotype      Ordering of each unique test   Time spent by me doing chart review, history and exam, documentation and further activities  "per the note      BMI  Estimated body mass index is 26.63 kg/m  as calculated from the following:    Height as of 11/17/23: 1.651 m (5' 5\").    Weight as of 11/17/23: 72.6 kg (160 lb).         See Patient Instructions  Patient Instructions   PLAN:   1.   Symptomatic therapy suggested: Continue current medications as prescribed.   2.  Orders Placed This Encounter   Procedures    Lipid panel reflex to direct LDL Fasting    CBC with platelets    Comprehensive metabolic panel    Ferritin    TSH with free T4 reflex    Vitamin B12    Vitamin D Deficiency    Iron & Iron Binding Capacity    Hepatitis C Screen Reflex to HCV RNA Quant and Genotype     3.  FUTURE LABS:       - Schedule a fasting blood draw   Will follow up and/or notify patient of  results via My Chart to determine further need for followup    Patient needs to follow up in if no improvement,or sooner if worsening of symptoms or other symptoms develop.            Video-Visit Details    Type of service:  Video Visit   Video End Time:5:04 PM  Originating Location (pt. Location): Home    Distant Location (provider location):  On-site  Platform used for Video Visit: Shayan  Signed Electronically by: KEL Goldstein CNP    "

## 2024-05-06 NOTE — PATIENT INSTRUCTIONS
PLAN:   1.   Symptomatic therapy suggested: Continue current medications as prescribed.   2.  Orders Placed This Encounter   Procedures    Lipid panel reflex to direct LDL Fasting    CBC with platelets    Comprehensive metabolic panel    Ferritin    TSH with free T4 reflex    Vitamin B12    Vitamin D Deficiency    Iron & Iron Binding Capacity    Hepatitis C Screen Reflex to HCV RNA Quant and Genotype     3.  FUTURE LABS:       - Schedule a fasting blood draw   Will follow up and/or notify patient of  results via My Chart to determine further need for followup    Patient needs to follow up in if no improvement,or sooner if worsening of symptoms or other symptoms develop.

## 2024-05-20 ENCOUNTER — LAB (OUTPATIENT)
Dept: LAB | Facility: OTHER | Age: 34
End: 2024-05-20
Payer: COMMERCIAL

## 2024-05-20 DIAGNOSIS — Z13.6 CARDIOVASCULAR SCREENING; LDL GOAL LESS THAN 160: ICD-10-CM

## 2024-05-20 DIAGNOSIS — Z11.59 NEED FOR HEPATITIS C SCREENING TEST: ICD-10-CM

## 2024-05-20 DIAGNOSIS — Z13.1 SCREENING FOR DIABETES MELLITUS (DM): ICD-10-CM

## 2024-05-20 DIAGNOSIS — Z13.0 SCREENING FOR DISORDER OF BLOOD AND BLOOD-FORMING ORGANS: ICD-10-CM

## 2024-05-20 DIAGNOSIS — Z13.29 SCREENING FOR THYROID DISORDER: ICD-10-CM

## 2024-05-20 DIAGNOSIS — R53.83 OTHER FATIGUE: ICD-10-CM

## 2024-05-20 DIAGNOSIS — R68.89 FLUCTUATION OF WEIGHT: ICD-10-CM

## 2024-05-20 DIAGNOSIS — L65.9 HAIR LOSS: ICD-10-CM

## 2024-05-20 LAB
ALBUMIN SERPL BCG-MCNC: 4.7 G/DL (ref 3.5–5.2)
ALP SERPL-CCNC: 65 U/L (ref 40–150)
ALT SERPL W P-5'-P-CCNC: 30 U/L (ref 0–50)
ANION GAP SERPL CALCULATED.3IONS-SCNC: 9 MMOL/L (ref 7–15)
AST SERPL W P-5'-P-CCNC: 24 U/L (ref 0–45)
BILIRUB SERPL-MCNC: 0.6 MG/DL
BUN SERPL-MCNC: 12.7 MG/DL (ref 6–20)
CALCIUM SERPL-MCNC: 9.8 MG/DL (ref 8.6–10)
CHLORIDE SERPL-SCNC: 105 MMOL/L (ref 98–107)
CHOLEST SERPL-MCNC: 186 MG/DL
CREAT SERPL-MCNC: 0.85 MG/DL (ref 0.51–0.95)
DEPRECATED HCO3 PLAS-SCNC: 25 MMOL/L (ref 22–29)
EGFRCR SERPLBLD CKD-EPI 2021: >90 ML/MIN/1.73M2
ERYTHROCYTE [DISTWIDTH] IN BLOOD BY AUTOMATED COUNT: 12.3 % (ref 10–15)
FASTING STATUS PATIENT QL REPORTED: NO
FASTING STATUS PATIENT QL REPORTED: NO
FERRITIN SERPL-MCNC: 85 NG/ML (ref 6–175)
GLUCOSE SERPL-MCNC: 93 MG/DL (ref 70–99)
HCT VFR BLD AUTO: 39.4 % (ref 35–47)
HDLC SERPL-MCNC: 46 MG/DL
HGB BLD-MCNC: 13.6 G/DL (ref 11.7–15.7)
IRON BINDING CAPACITY (ROCHE): 322 UG/DL (ref 240–430)
IRON SATN MFR SERPL: 37 % (ref 15–46)
IRON SERPL-MCNC: 120 UG/DL (ref 37–145)
LDLC SERPL CALC-MCNC: 101 MG/DL
MCH RBC QN AUTO: 31.6 PG (ref 26.5–33)
MCHC RBC AUTO-ENTMCNC: 34.5 G/DL (ref 31.5–36.5)
MCV RBC AUTO: 91 FL (ref 78–100)
NONHDLC SERPL-MCNC: 140 MG/DL
PLATELET # BLD AUTO: 316 10E3/UL (ref 150–450)
POTASSIUM SERPL-SCNC: 4.1 MMOL/L (ref 3.4–5.3)
PROT SERPL-MCNC: 7.6 G/DL (ref 6.4–8.3)
RBC # BLD AUTO: 4.31 10E6/UL (ref 3.8–5.2)
SODIUM SERPL-SCNC: 139 MMOL/L (ref 135–145)
TRIGL SERPL-MCNC: 193 MG/DL
TSH SERPL DL<=0.005 MIU/L-ACNC: 1.24 UIU/ML (ref 0.3–4.2)
VIT B12 SERPL-MCNC: 751 PG/ML (ref 232–1245)
VIT D+METAB SERPL-MCNC: 41 NG/ML (ref 20–50)
WBC # BLD AUTO: 7.5 10E3/UL (ref 4–11)

## 2024-05-20 PROCEDURE — 86803 HEPATITIS C AB TEST: CPT

## 2024-05-20 PROCEDURE — 82607 VITAMIN B-12: CPT

## 2024-05-20 PROCEDURE — 80061 LIPID PANEL: CPT

## 2024-05-20 PROCEDURE — 83550 IRON BINDING TEST: CPT

## 2024-05-20 PROCEDURE — 80053 COMPREHEN METABOLIC PANEL: CPT

## 2024-05-20 PROCEDURE — 84443 ASSAY THYROID STIM HORMONE: CPT

## 2024-05-20 PROCEDURE — 82728 ASSAY OF FERRITIN: CPT

## 2024-05-20 PROCEDURE — 82306 VITAMIN D 25 HYDROXY: CPT

## 2024-05-20 PROCEDURE — 83540 ASSAY OF IRON: CPT

## 2024-05-20 PROCEDURE — 36415 COLL VENOUS BLD VENIPUNCTURE: CPT

## 2024-05-20 PROCEDURE — 85027 COMPLETE CBC AUTOMATED: CPT

## 2024-05-21 LAB — HCV AB SERPL QL IA: NONREACTIVE

## 2024-05-22 NOTE — RESULT ENCOUNTER NOTE
Kasia Doherty,    Attached are your test results.  -Normal red blood cell (hgb) levels, normal white blood cell count and normal platelet levels.  -LDL(bad) cholesterol level is elevated, HDL(good) cholesterol level is low and your triglycerides are elevated which can increase your heart disease risk.  A diet high in fat and simple carbohydrates, genetics and being overweight can contribute to this. ADVISE: exercising 150 minutes of aerobic exercise per week (30 minutes for 5 days per week or 50 minutes for 3 days per week are options), eating a low saturated fat/low carbohydrate diet, and omega-3 fatty acids  daily are helpful to improve this. In 12 months, you should recheck your fasting cholesterol panel by scheduling a lab-only appointment.  -Liver and gallbladder tests are normal (ALT,AST, Alk phos, bilirubin), kidney function is normal (Cr, GFR), sodium is normal, potassium is normal, calcium is normal, glucose is normal.  -TSH (thyroid stimulating hormone) level is normal which indicates normal thyroid function.  -Vitamin D level is normal and getting 1000 IU daily in your diet or supplements is recommended.   -Ferritin (iron) level is normal.   Please contact us if you have any questions.    Eneida Aviles, CNP

## 2024-05-25 ENCOUNTER — HEALTH MAINTENANCE LETTER (OUTPATIENT)
Age: 34
End: 2024-05-25

## 2024-07-16 ENCOUNTER — TRANSFERRED RECORDS (OUTPATIENT)
Dept: HEALTH INFORMATION MANAGEMENT | Facility: CLINIC | Age: 34
End: 2024-07-16
Payer: COMMERCIAL

## 2025-06-14 ENCOUNTER — HEALTH MAINTENANCE LETTER (OUTPATIENT)
Age: 35
End: 2025-06-14

## 2025-07-30 ENCOUNTER — NURSE TRIAGE (OUTPATIENT)
Dept: FAMILY MEDICINE | Facility: CLINIC | Age: 35
End: 2025-07-30
Payer: COMMERCIAL

## 2025-07-30 DIAGNOSIS — M79.604 LOW BACK PAIN RADIATING TO BOTH LEGS: ICD-10-CM

## 2025-07-30 DIAGNOSIS — M54.50 LOW BACK PAIN RADIATING TO BOTH LEGS: ICD-10-CM

## 2025-07-30 DIAGNOSIS — M79.605 LOW BACK PAIN RADIATING TO BOTH LEGS: ICD-10-CM

## 2025-07-30 DIAGNOSIS — M48.061 NEURAL FORAMINAL STENOSIS OF LUMBAR SPINE: ICD-10-CM

## 2025-07-30 RX ORDER — OXYCODONE AND ACETAMINOPHEN 5; 325 MG/1; MG/1
1 TABLET ORAL EVERY 6 HOURS PRN
Qty: 12 TABLET | Refills: 0 | OUTPATIENT
Start: 2025-07-30

## 2025-07-31 NOTE — TELEPHONE ENCOUNTER
Please call and triage symptoms  Have not seen in over a year   If pain is that severe needs an in person appointment with someone. Looks like she lives in [place] maybe can get an appointment there as I do not have any openings this week   Thanks

## 2025-07-31 NOTE — TELEPHONE ENCOUNTER
C/o back pain    Patient with known history of back problems. Patient has been trying to avoid back surgery. Patient has been using over the counter tylenol, ibuprofen, heat, and ice for pain management. Patient reports hurting her back over the weekend and has not gotten better. Reports pain at a 4 or 5 if laying flat with pillows under legs and pain at a 10 if stands or tries to stand straight.     Unable to stand up straight. Legs will tremble and patient will need to use furniture to hold herself up. Rates pain as moderate to severe. Mild to moderate weakness. Pain radiates down legs if tries to move. Denies numbness, fever, urinary/bowel problems.     Advised in clinic visit. Appointment scheduled. Reviewed care guidelines- heat, ibuprofen, tylenol, stretching. Patient verbalized understanding and agrees with plan. Advised to call with questions or concerns.    Luann Gale RN, BSN, PHN    Reason for Disposition   SEVERE back pain (e.g., excruciating, unable to do any normal activities) and not improved after pain medicine and CARE ADVICE    Additional Information   Negative: Passed out (e.g., fainted, lost consciousness, blacked out and was not responding)   Negative: Shock suspected (e.g., cold/pale/clammy skin, too weak to stand, low BP, rapid pulse)   Negative: Sounds like a life-threatening emergency to the triager   Negative: SEVERE back pain of sudden onset and age > 60 years   Negative: SEVERE abdominal pain (e.g., excruciating)   Negative: Abdominal pain and age > 60 years   Negative: Unable to urinate (or only a few drops) and bladder feels very full   Negative: Loss of bladder or bowel control (urine or bowel incontinence; wetting self, leaking stool) of new-onset   Negative: Numbness (loss of sensation) in groin or rectal area   Negative: Pain radiates into groin, scrotum   Negative: Blood in urine (red, pink, or tea-colored)   Negative: Vomiting and pain over lower ribs of back (i.e., flank  "- kidney area)   Negative: Weakness of a leg or foot (e.g., unable to bear weight, dragging foot)   Negative: Patient sounds very sick or weak to the triager   Negative: Fever > 100.4 F (38.0 C) and flank pain   Negative: Pain or burning with passing urine (urination)    Answer Assessment - Initial Assessment Questions  1. ONSET: \"When did the pain begin?\" (e.g., minutes, hours, days)      Started Friday night/Saturday morning, stood up to go to the bathroom, fell. Got herself up, took tylenol and used a heating pad. Can't stand up straight due to pain. When does try to stand up, legs start visibly shaking    2. LOCATION: \"Where does it hurt?\" (upper, mid or lower back)      Bilateral hip region to mid back. Has had back problems for a while now. Anytime throws back out, usually feels loose but this time is tight. Tried going to chiro and couldn't do much due to the inflammation    3. SEVERITY: \"How bad is the pain?\"  (e.g., Scale 1-10; mild, moderate, or severe)      When laying flat with pillows, pain is 4 or 5 on 0-10 pain scale.   When up walking, pain gets to a 10, can't lift anything due to the sharp shooting pain in lower back that shoots down legs    4. PATTERN: \"Is the pain constant?\" (e.g., yes, no; constant, intermittent)       Constant    5. RADIATION: \"Does the pain shoot into your legs or somewhere else?\"      Shoots down legs    6. CAUSE:  \"What do you think is causing the back pain?\"       Unknown- has history of back problems and has been avoiding back surgery    7. BACK OVERUSE:  \"Any recent lifting of heavy objects, strenuous work or exercise?\"      No    8. MEDICINES: \"What have you taken so far for the pain?\" (e.g., nothing, acetaminophen, NSAIDS)      Tylenol, heat, ice, ibuprofen, minimal relief.     9. NEUROLOGIC SYMPTOMS: \"Do you have any weakness, numbness, or problems with bowel/bladder control?\"      If trying to stand up straight, legs will tremble and needs to hold onto something to " "provide support    10. OTHER SYMPTOMS: \"Do you have any other symptoms?\" (e.g., fever, abdomen pain, burning with urination, blood in urine)        No    11. PREGNANCY: \"Is there any chance you are pregnant?\" \"When was your last menstrual period?\"        No, IUD, irregular periods    Protocols used: Back Pain-A-[facility]    "

## 2025-08-20 ENCOUNTER — MYC MEDICAL ADVICE (OUTPATIENT)
Dept: OBGYN | Facility: CLINIC | Age: 35
End: 2025-08-20
Payer: MEDICAID

## 2025-08-27 ENCOUNTER — OFFICE VISIT (OUTPATIENT)
Dept: OBGYN | Facility: CLINIC | Age: 35
End: 2025-08-27
Payer: MEDICAID

## 2025-08-27 VITALS
HEART RATE: 90 BPM | DIASTOLIC BLOOD PRESSURE: 79 MMHG | SYSTOLIC BLOOD PRESSURE: 130 MMHG | WEIGHT: 155.2 LBS | BODY MASS INDEX: 25.83 KG/M2 | OXYGEN SATURATION: 98 %

## 2025-08-27 DIAGNOSIS — N92.6 IRREGULAR MENSES: Primary | ICD-10-CM

## 2025-08-27 DIAGNOSIS — N94.3 PMS (PREMENSTRUAL SYNDROME): ICD-10-CM

## 2025-08-27 LAB
ESTRADIOL SERPL-MCNC: 10 PG/ML
FSH SERPL IRP2-ACNC: 21.3 MIU/ML
SHBG SERPL-SCNC: 29 NMOL/L (ref 30–135)
TSH SERPL DL<=0.005 MIU/L-ACNC: 0.72 UIU/ML (ref 0.3–4.2)

## 2025-08-27 PROCEDURE — 3075F SYST BP GE 130 - 139MM HG: CPT | Performed by: OBSTETRICS & GYNECOLOGY

## 2025-08-27 PROCEDURE — 36415 COLL VENOUS BLD VENIPUNCTURE: CPT | Performed by: OBSTETRICS & GYNECOLOGY

## 2025-08-27 PROCEDURE — 99203 OFFICE O/P NEW LOW 30 MIN: CPT | Performed by: OBSTETRICS & GYNECOLOGY

## 2025-08-27 PROCEDURE — 84443 ASSAY THYROID STIM HORMONE: CPT | Performed by: OBSTETRICS & GYNECOLOGY

## 2025-08-27 PROCEDURE — 82670 ASSAY OF TOTAL ESTRADIOL: CPT | Performed by: OBSTETRICS & GYNECOLOGY

## 2025-08-27 PROCEDURE — 84270 ASSAY OF SEX HORMONE GLOBUL: CPT | Performed by: OBSTETRICS & GYNECOLOGY

## 2025-08-27 PROCEDURE — 3078F DIAST BP <80 MM HG: CPT | Performed by: OBSTETRICS & GYNECOLOGY

## 2025-08-27 PROCEDURE — 83001 ASSAY OF GONADOTROPIN (FSH): CPT | Performed by: OBSTETRICS & GYNECOLOGY

## (undated) DEVICE — GLOVE EXAM NITRILE LG PF LATEX FREE 5064

## (undated) DEVICE — SOL WATER IRRIG 500ML BOTTLE 2F7113

## (undated) DEVICE — SPECIMEN CONTAINER 3OZ W/FORMALIN 59901

## (undated) DEVICE — SUCTION MANIFOLD NEPTUNE 2 SYS 1 PORT 702-025-000

## (undated) DEVICE — ENDO BITE BLOCK ADULT OMNI-BLOC

## (undated) DEVICE — TUBING SUCTION MEDI-VAC 1/4"X20' N620A

## (undated) DEVICE — KIT ENDO TURNOVER/PROCEDURE CARRY-ON 101822

## (undated) DEVICE — GOWN IMPERVIOUS 2XL BLUE

## (undated) DEVICE — SYR 30ML SLIP TIP W/O NDL 302833

## (undated) DEVICE — SOL WATER IRRIG 1000ML BOTTLE 07139-09

## (undated) DEVICE — PREP CHLORAPREP 26ML TINTED ORANGE  260815

## (undated) DEVICE — SUCTION CATH AIRLIFE TRI-FLO W/CONTROL PORT 14FR  T60C

## (undated) RX ORDER — LIDOCAINE HYDROCHLORIDE 20 MG/ML
INJECTION, SOLUTION INFILTRATION; PERINEURAL
Status: DISPENSED
Start: 2019-04-24

## (undated) RX ORDER — PROPOFOL 10 MG/ML
INJECTION, EMULSION INTRAVENOUS
Status: DISPENSED
Start: 2019-04-24

## (undated) RX ORDER — SIMETHICONE 40MG/0.6ML
SUSPENSION, DROPS(FINAL DOSAGE FORM)(ML) ORAL
Status: DISPENSED
Start: 2019-04-24